# Patient Record
Sex: FEMALE | Race: WHITE | NOT HISPANIC OR LATINO | Employment: FULL TIME | URBAN - METROPOLITAN AREA
[De-identification: names, ages, dates, MRNs, and addresses within clinical notes are randomized per-mention and may not be internally consistent; named-entity substitution may affect disease eponyms.]

---

## 2017-01-18 ENCOUNTER — APPOINTMENT (OUTPATIENT)
Dept: PLASTIC SURGERY | Facility: IMAGING CENTER | Age: 67
End: 2017-01-18

## 2017-02-01 ENCOUNTER — OFFICE VISIT (OUTPATIENT)
Dept: MEDICAL GROUP | Facility: PHYSICIAN GROUP | Age: 67
End: 2017-02-01
Payer: MEDICARE

## 2017-02-01 VITALS
BODY MASS INDEX: 29.59 KG/M2 | WEIGHT: 167 LBS | HEART RATE: 81 BPM | DIASTOLIC BLOOD PRESSURE: 80 MMHG | OXYGEN SATURATION: 95 % | HEIGHT: 63 IN | SYSTOLIC BLOOD PRESSURE: 148 MMHG | TEMPERATURE: 97.9 F

## 2017-02-01 DIAGNOSIS — F11.20 OPIOID TYPE DEPENDENCE, CONTINUOUS (HCC): ICD-10-CM

## 2017-02-01 DIAGNOSIS — Z76.0 ENCOUNTER FOR MEDICATION REFILL: ICD-10-CM

## 2017-02-01 DIAGNOSIS — M25.561 ACUTE PAIN OF RIGHT KNEE: ICD-10-CM

## 2017-02-01 DIAGNOSIS — Z78.0 POSTMENOPAUSAL: ICD-10-CM

## 2017-02-01 DIAGNOSIS — M54.42 CHRONIC LEFT-SIDED LOW BACK PAIN WITH LEFT-SIDED SCIATICA: ICD-10-CM

## 2017-02-01 DIAGNOSIS — G89.29 CHRONIC LEFT-SIDED LOW BACK PAIN WITH LEFT-SIDED SCIATICA: ICD-10-CM

## 2017-02-01 DIAGNOSIS — M70.62 TROCHANTERIC BURSITIS OF LEFT HIP: ICD-10-CM

## 2017-02-01 PROCEDURE — G8432 DEP SCR NOT DOC, RNG: HCPCS | Performed by: NURSE PRACTITIONER

## 2017-02-01 PROCEDURE — 1101F PT FALLS ASSESS-DOCD LE1/YR: CPT | Performed by: NURSE PRACTITIONER

## 2017-02-01 PROCEDURE — 20600 DRAIN/INJ JOINT/BURSA W/O US: CPT | Performed by: NURSE PRACTITIONER

## 2017-02-01 PROCEDURE — 4040F PNEUMOC VAC/ADMIN/RCVD: CPT | Performed by: NURSE PRACTITIONER

## 2017-02-01 PROCEDURE — 1036F TOBACCO NON-USER: CPT | Performed by: NURSE PRACTITIONER

## 2017-02-01 PROCEDURE — G8482 FLU IMMUNIZE ORDER/ADMIN: HCPCS | Performed by: NURSE PRACTITIONER

## 2017-02-01 PROCEDURE — 99214 OFFICE O/P EST MOD 30 MIN: CPT | Mod: 25 | Performed by: NURSE PRACTITIONER

## 2017-02-01 PROCEDURE — 3014F SCREEN MAMMO DOC REV: CPT | Performed by: NURSE PRACTITIONER

## 2017-02-01 PROCEDURE — 3017F COLORECTAL CA SCREEN DOC REV: CPT | Performed by: NURSE PRACTITIONER

## 2017-02-01 PROCEDURE — G8420 CALC BMI NORM PARAMETERS: HCPCS | Performed by: NURSE PRACTITIONER

## 2017-02-01 RX ORDER — FEXOFENADINE HCL AND PSEUDOEPHEDRINE HCI 60; 120 MG/1; MG/1
1 TABLET, EXTENDED RELEASE ORAL 2 TIMES DAILY
Qty: 60 TAB | Refills: 0 | Status: SHIPPED | OUTPATIENT
Start: 2017-02-01 | End: 2017-03-01 | Stop reason: SDUPTHER

## 2017-02-01 NOTE — MR AVS SNAPSHOT
"        Kayla Jensen   2017 9:20 AM   Office Visit   MRN: [id number]    Department:  [facility]   Dept Phone:  [phone]    Description:  Female : 1950   Provider:  KATY Gay           Reason for Visit     Hip Pain x3days. left hip    Knee Pain x3days. right knee      Allergies as of 2017     Allergen Noted Reactions    Aspirin 2009       Stomach pain    Ibuprofen 2009       Stomach pain    Tape 2009   Rash      You were diagnosed with     Acute frontal sinusitis, recurrence not specified   [3750274]       Encounter for medication refill   [231123]       Chronic left-sided low back pain with left-sided sciatica   [4616257]       Opioid type dependence, continuous (CMS-HCC)   [304.01.ICD-9-CM]         Vital Signs     Blood Pressure Pulse Temperature Height Weight Body Mass Index    148/80 mmHg 81 36.6 °C (97.9 °F) 1.6 m (5' 3\") 75.751 kg (167 lb) 29.59 kg/m2    Oxygen Saturation Smoking Status                95% Former Smoker          Basic Information     Date Of Birth Sex Race Ethnicity Preferred Language    1950 Female White Non- English      Your appointments     Mar 15, 2017  1:00 PM   Medical Spa with COSMETIC LASER MD   [facility] ([facility])    [address] [facility] [address]   [phone]            2017  9:00 AM   BONE DENSITY (DEXASCAN) with [facility] BD 1   [facility] ([address])    [address]  [facility] [address]   [phone]           No calcium supplements 24 hours prior to exam, including antacids or multivitamins w/calcium.  Pt may eat and drink normally.  No injection procedures prior to Dexa on the same day.  No barium contrast, no CTs with IV or oral contrast, no Pet/CTs and no Nuc Med injections for 7 days prior to a Dexa (including Barium Swallow, Upper GI, Small Bowel Series).  If scheduling a Dexa on the same day as a CT with IV or " oral contrast, any test with barium, Pet/CT or a Nuc Med with injection, always schedule the Dexa before the other study.            Apr 04, 2017  9:30 AM   MA SCRN10 with [facility] MG 2   [facility] ([address])    [address]  [place] [id number] [phone]           No deodorant, powder, perfume or lotion under the arm or breast area.            May 30, 2017  8:00 AM   Established Patient with KATY Gay   [facility])    [address] [facility] [id number] [phone]           You will be receiving a confirmation call a few days before your appointment from our automated call confirmation system.              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   3/18/2016 - Present    Personal history of smoking Z87.891   3/18/2016 - Present    Acute recurrent pansinusitis J01.41   9/21/2016 - Present    Opioid type dependence, continuous (CMS-HCC) F11.20   2/1/2017 - Present      Health Maintenance        Date Due Completion Dates    BONE DENSITY 11/23/2014 ---    MAMMOGRAM 4/8/2017 4/8/2016, 4/1/2016, 4/1/2016    IMM PNEUMOCOCCAL 65+ (ADULT) LOW/MEDIUM RISK SERIES (2 of 2 - PPSV23) 11/29/2017 11/29/2016    COLONOSCOPY 11/29/2019 11/29/2009 (Done)    Override on 11/29/2009: Done ([facility])    IMM DTaP/Tdap/Td Vaccine (2 - Td) 11/29/2026 11/29/2016            Current Immunizations     13-VALENT PCV PREVNAR 11/29/2016    Influenza Vaccine Adult HD 9/6/2016 10:36 AM    Influenza Vaccine Quad Inj (Pf) 10/2/2014    SHINGLES VACCINE 11/29/2016    Tdap Vaccine 11/29/2016      Below and/or attached are the medications your provider expects you to take. Review all of your home medications and newly ordered medications with your provider and/or pharmacist. Follow medication  instructions as directed by your provider and/or pharmacist. Please keep your medication list with you and share with your provider. Update the information when medications are discontinued, doses are changed, or new medications (including over-the-counter products) are added; and carry medication information at all times in the event of emergency situations     Allergies:  ASPIRIN - (reactions not documented)     IBUPROFEN - (reactions not documented)     TAPE - Rash               Medications  Valid as of: February 01, 2017 - 10:09 AM    Generic Name Brand Name Tablet Size Instructions for use    Acetaminophen-Codeine (Tab) TYLENOL #3 300-30 MG Take 1 Tab by mouth every four hours as needed.        Albuterol Sulfate (Aero Soln) albuterol 108 (90 BASE) MCG/ACT Inhale 2 Puffs by mouth every 6 hours as needed for Shortness of Breath.        Fexofenadine-Pseudoephedrine (TABLET SR 12 HR) ALLEGRA-D  MG Take 1 Tab by mouth 2 times a day.        Gabapentin (Cap) NEURONTIN 300 MG Take 1 Cap by mouth 2 Times a Day.        Mometasone Furoate (Suspension) NASONEX 50 MCG/ACT Spray 2 Sprays in nose every day.        Ondansetron (TABLET DISPERSIBLE) ZOFRAN ODT 4 MG Take 1 Tab by mouth every 8 hours as needed for Nausea/Vomiting.        TraMADol HCl (Tab) ULTRAM 50 MG TAKE 1 TABLET BY MOUTH EVERY 8 HOURS AS NEDDED        .                 Medicines prescribed today were sent to:     [facility] #[id number] - [place] - [address] [place] [address] AT [address] [address]    Phone: [phone] Fax: [phone]    Open 24 Hours?: No      Medication refill instructions:       If your prescription bottle indicates you have medication refills left, it is not necessary to call your provider’s office. Please contact your pharmacy and they will refill your medication.    If your prescription bottle indicates you do not have any refills left, you may request refills at any time through one of the following  ways: The online [employer] system (except Urgent Care), by calling your provider’s office, or by asking your pharmacy to contact your provider’s office with a refill request. Medication refills are processed only during regular business hours and may not be available until the next business day. Your provider may request additional information or to have a follow-up visit with you prior to refilling your medication.   *Please Note: Medication refills are assigned a new Rx number when refilled electronically. Your pharmacy may indicate that no refills were authorized even though a new prescription for the same medication is available at the pharmacy. Please request the medicine by name with the pharmacy before contacting your provider for a refill.        Referral     A referral request has been sent to our patient care coordination department. Please allow 3-5 business days for us to process this request and contact you either by phone or mail. If you do not hear from us by the 5th business day, please call us at [phone].           [employer] Access Code: [email or web address]  Expires: 2/17/2017  4:49 PM    [employer]  A secure, online tool to manage your health information     [employer]® is a secure, online tool that connects you to your personalized health information from the privacy of your home -- day or night - making it very easy for you to manage your healthcare. Once the activation process is completed, you can even access your medical information using the [employer] jeanna, which is available for free in the [employer] Jeanna store or Google Play store.     [employer] provides the following levels of access (as shown below):   My Chart Features   [facility]  Specialists [facility]  Urgent  Care Non-[facility]  Primary Care  Doctor   Email your healthcare team securely and privately 24/7 X X X    Manage appointments: schedule your next appointment; view details of past/upcoming appointments X       Request prescription refills. X      View recent personal medical records, including lab and immunizations X X X X   View health record, including health history, allergies, medications X X X X   Read reports about your outpatient visits, procedures, consult and ER notes X X X X   See your discharge summary, which is a recap of your hospital and/or ER visit that includes your diagnosis, lab results, and care plan. X X       How to register for [employer]:  1. Go to  [email or web address].  2. Click on the Sign Up Now box, which takes you to the New Member Sign Up page. You will need to provide the following information:  a. Enter your [employer] Access Code exactly as it appears at the top of this page. (You will not need to use this code after you’ve completed the sign-up process. If you do not sign up before the expiration date, you must request a new code.)   b. Enter your date of birth.   c. Enter your home email address.   d. Click Submit, and follow the next screen’s instructions.  3. Create a [employer] ID. This will be your [employer] login ID and cannot be changed, so think of one that is secure and easy to remember.  4. Create a [employer] password. You can change your password at any time.  5. Enter your Password Reset Question and Answer. This can be used at a later time if you forget your password.   6. Enter your e-mail address. This allows you to receive e-mail notifications when new information is available in [employer].  7. Click Sign Up. You can now view your health information.    For assistance activating your [employer] account, call [phone]

## 2017-02-01 NOTE — PROGRESS NOTES
Subjective:     Chief Complaint   Patient presents with   • Hip Pain     x3days. left hip   • Knee Pain     x3days. right knee       HPI  Kayla Jensen is a 66 y.o. female here today for complaints of severe left hip pain and moderate right knee pain. Two days ago she was in [place] on vacation and walking a lot, but states she walks a lot on a normal basis anyway. She developed pain Sunday night. Monday the pain got worse. She has had back pain for quite some time with left-sided sciatica, but is getting progressively worse in left hip. She also states that this pain feels somewhat different and in a different location. She has also developed right knee pain, with swelling in mdial aspect of knee. States that this occurs intermittently. No treatments tried so far. Denies any bowel or bladder changes. She is having difficulty walking due to the pain. Denies any numbness, tingling, or weakness of lower extremities. Denies any erythema or warmth of knee.     Additionally, patient would like me to take over her chronic pain management.    Diagnoses of Trochanteric bursitis of left hip, Acute pain of right knee, Chronic left-sided low back pain with left-sided sciatica, Opioid type dependence, continuous (CMS-[facility]), Encounter for medication refill, and Postmenopausal were pertinent to this visit.    Allergies: Aspirin; Ibuprofen; and Tape  Current medicines (including changes today)  Current Outpatient Prescriptions   Medication Sig Dispense Refill   • acetaminophen-codeine #3 (TYLENOL #3) 300-30 MG Tab Take 1 Tab by mouth every four hours as needed. 90 Tab 0   • fexofenadine-pseudoephedrine (ALLEGRA-D)  MG per tablet Take 1 Tab by mouth 2 times a day. 60 Tab 0   • gabapentin (NEURONTIN) 300 MG Cap Take 1 Cap by mouth 2 Times a Day. 180 Cap 1   • mometasone (NASONEX) 50 MCG/ACT nasal spray Spray 2 Sprays in nose every day. 1 Inhaler 0   • ondansetron (ZOFRAN ODT) 4 MG TABLET DISPERSIBLE Take 1 Tab by  "mouth every 8 hours as needed for Nausea/Vomiting. 10 Tab 0   • tramadol (ULTRAM) 50 MG Tab TAKE 1 TABLET BY MOUTH EVERY 8 HOURS AS NEDDED 90 Tab 0   • albuterol 108 (90 BASE) MCG/ACT Aero Soln inhalation aerosol Inhale 2 Puffs by mouth every 6 hours as needed for Shortness of Breath. 8.5 g 3     No current facility-administered medications for this visit.       She  has a past medical history of Heart burn; Unspecified hemorrhagic conditions (CMS-HCC); Personal history of venous thrombosis and embolism; Allergy; and GERD (gastroesophageal reflux disease). She also has no past medical history of Infectious disease, Psychiatric problem, Pacemaker, Angina, Heart valve disease, Rheumatic fever, Bronchitis, Pneumonia, Jaundice, Indigestion, Unspecified urinary incontinence, Dialysis, Other specified symptom associated with female genital organs, or Backpain.    Health Maintenance: DEXA due    ROS  As stated in HPI and additionally  General: No fever or chills     Objective:     Blood pressure 148/80, pulse 81, temperature 36.6 °C (97.9 °F), height 1.6 m (5' 3\"), weight 75.751 kg (167 lb), SpO2 95 %. Body mass index is 29.59 kg/(m^2).  Physical Exam:  General: Alert, oriented, in no acute distress.  Eye contact is good, speech goal directed, affect calm  Ext: no edema, normal color and temperature.   Right knee: +edema medial aspect along joint line. Full unrestricted symmetric ROM. No crepitus. No erythema or warmth  No palpable effusions. + joint line tenderness medial only.   Alignment is normal.    Ligaments: anterior drawer test negative, posterior drawer test negative  Strength 5/5 Quadriceps, hamstrings, extensor hallius longus, tibialis anterior, gastroc.   Gait normal. Able to bear full weight. Posterior tibial and pedal pulses 2+.   Spine: Thoracic and lumbar paraspinous muscles without tenderness or spasm. No spinous process tenderness. No SI joint tenderness. Full hip ROM bilat, but pain with left hip " abduction. SLT negative. Strength 5/5 prox and distal LE. Significant tenderness with palpation of left hip bursa site    A steroid injection was performed at left hip trochanter using 2% plain Lidocaine and 60 mg of Kenalog. This was well tolerated.      Assessment and Plan:   The following treatment plan was discussed  1. Trochanteric bursitis of left hip  based on significant tenderness with palpation during examination, I suspect bursitis. Joint injection performed at bursa site of left trochanter. Patient tolerated well and was educated on risk of infection and symptoms to watch out for. Encourage rest, ice, and Epsom salt baths    2. Acute pain of right knee  encouraged rest, ice, elevation, and to notify me if it does not improve in 2 weeks    3. Chronic left-sided low back pain with left-sided sciatica  acetaminophen-codeine #3 (TYLENOL #3) 300-30 MG Tab    REFERRAL TO PHYSICAL THERAPY Reason for Therapy: Eval/Treat/Report    Controlled Substance Treatment Agreement   4. Opioid type dependence, continuous (CMS-HCC)  Controlled Substance Treatment Agreement   5. Encounter for medication refill  fexofenadine-pseudoephedrine (ALLEGRA-D)  MG per tablet   6. Postmenopausal  DS-BONE DENSITY STUDY (DEXA)       Followup: Return in about 3 months (around 5/1/2017). sooner should new symptoms or problems arise.

## 2017-02-27 ENCOUNTER — OFFICE VISIT (OUTPATIENT)
Dept: URGENT CARE | Facility: PHYSICIAN GROUP | Age: 67
End: 2017-02-27
Payer: MEDICARE

## 2017-02-27 VITALS
HEART RATE: 83 BPM | OXYGEN SATURATION: 96 % | BODY MASS INDEX: 28.35 KG/M2 | SYSTOLIC BLOOD PRESSURE: 118 MMHG | WEIGHT: 160 LBS | DIASTOLIC BLOOD PRESSURE: 72 MMHG | TEMPERATURE: 98.6 F | HEIGHT: 63 IN

## 2017-02-27 DIAGNOSIS — J01.90 ACUTE SINUSITIS, RECURRENCE NOT SPECIFIED, UNSPECIFIED LOCATION: ICD-10-CM

## 2017-02-27 DIAGNOSIS — R05.9 COUGH: ICD-10-CM

## 2017-02-27 PROCEDURE — G8432 DEP SCR NOT DOC, RNG: HCPCS | Performed by: NURSE PRACTITIONER

## 2017-02-27 PROCEDURE — 4040F PNEUMOC VAC/ADMIN/RCVD: CPT | Performed by: NURSE PRACTITIONER

## 2017-02-27 PROCEDURE — G8482 FLU IMMUNIZE ORDER/ADMIN: HCPCS | Performed by: NURSE PRACTITIONER

## 2017-02-27 PROCEDURE — G8420 CALC BMI NORM PARAMETERS: HCPCS | Performed by: NURSE PRACTITIONER

## 2017-02-27 PROCEDURE — 99214 OFFICE O/P EST MOD 30 MIN: CPT | Performed by: NURSE PRACTITIONER

## 2017-02-27 PROCEDURE — 3017F COLORECTAL CA SCREEN DOC REV: CPT | Performed by: NURSE PRACTITIONER

## 2017-02-27 PROCEDURE — 1101F PT FALLS ASSESS-DOCD LE1/YR: CPT | Performed by: NURSE PRACTITIONER

## 2017-02-27 PROCEDURE — 3014F SCREEN MAMMO DOC REV: CPT | Performed by: NURSE PRACTITIONER

## 2017-02-27 PROCEDURE — 1036F TOBACCO NON-USER: CPT | Performed by: NURSE PRACTITIONER

## 2017-02-27 RX ORDER — TRIAMCINOLONE ACETONIDE 55 UG/1
2 SPRAY, METERED NASAL DAILY
Qty: 1 INHALER | Refills: 0 | Status: SHIPPED | OUTPATIENT
Start: 2017-02-27 | End: 2017-11-15

## 2017-02-27 RX ORDER — CODEINE PHOSPHATE AND GUAIFENESIN 10; 100 MG/5ML; MG/5ML
5 SOLUTION ORAL EVERY 4 HOURS PRN
Qty: 240 ML | Refills: 0 | Status: SHIPPED | OUTPATIENT
Start: 2017-02-27 | End: 2017-03-29

## 2017-02-27 ASSESSMENT — ENCOUNTER SYMPTOMS
HEMOPTYSIS: 0
RHINORRHEA: 0
SWEATS: 0
SHORTNESS OF BREATH: 0
HEARTBURN: 0
MYALGIAS: 0
WEIGHT LOSS: 0
HEADACHES: 0
FEVER: 0
WHEEZING: 0
SORE THROAT: 1
COUGH: 1
CHILLS: 0

## 2017-02-27 ASSESSMENT — COPD QUESTIONNAIRES: COPD: 0

## 2017-02-27 NOTE — PROGRESS NOTES
"Subjective:      Kayla Jensen is a 66 y.o. female who presents with Cough    Chief Complaint   Patient presents with   • Cough     sinus pressure, chest congestion        Denies past medical, surgical or family history that is significant to today's problem.   RX or OTC medications reviewed with patient today.   Allergies   Allergen Reactions   • Aspirin      Stomach pain   • Ibuprofen      Stomach pain   • Tape Rash               Cough  This is a new problem. The current episode started yesterday. The problem has been gradually worsening. The problem occurs constantly. The cough is non-productive. Associated symptoms include nasal congestion and a sore throat. Pertinent negatives include no chest pain, chills, ear congestion, ear pain, fever, headaches, heartburn, hemoptysis, myalgias, postnasal drip, rash, rhinorrhea, shortness of breath, sweats, weight loss or wheezing. Nothing aggravates the symptoms. Treatments tried: Allegra D. The treatment provided mild relief. Her past medical history is significant for bronchitis. There is no history of asthma, bronchiectasis, COPD, emphysema, environmental allergies or pneumonia.       Review of Systems   Constitutional: Negative for fever, chills and weight loss.   HENT: Positive for sore throat. Negative for ear pain, postnasal drip and rhinorrhea.    Respiratory: Positive for cough. Negative for hemoptysis, shortness of breath and wheezing.    Cardiovascular: Negative for chest pain.   Gastrointestinal: Negative for heartburn.   Musculoskeletal: Negative for myalgias.   Skin: Negative for rash.   Neurological: Negative for headaches.   Endo/Heme/Allergies: Negative for environmental allergies.          Objective:     /72 mmHg  Pulse 83  Temp(Src) 37 °C (98.6 °F)  Ht 1.6 m (5' 3\")  Wt 72.576 kg (160 lb)  BMI 28.35 kg/m2  SpO2 96%     Physical Exam   Constitutional: She is oriented to person, place, and time. Vital signs are normal. She appears " well-developed and well-nourished.  Non-toxic appearance. She does not have a sickly appearance. She does not appear ill. No distress.   HENT:   Head: Normocephalic.   Right Ear: Hearing, external ear and ear canal normal. Tympanic membrane is bulging.   Left Ear: Hearing, external ear and ear canal normal. Tympanic membrane is injected and bulging.   Nose: Mucosal edema and rhinorrhea present. Right sinus exhibits no maxillary sinus tenderness and no frontal sinus tenderness. Left sinus exhibits no maxillary sinus tenderness and no frontal sinus tenderness.   Mouth/Throat: Uvula is midline and mucous membranes are normal. Posterior oropharyngeal erythema present. No oropharyngeal exudate or posterior oropharyngeal edema.   Eyes: Lids are normal. Pupils are equal, round, and reactive to light.   Neck: Trachea normal, normal range of motion and phonation normal. Neck supple.   Cardiovascular: Normal rate, regular rhythm, normal heart sounds and normal pulses.    Pulmonary/Chest: Effort normal and breath sounds normal. No tachypnea. No respiratory distress. She has no decreased breath sounds. She has no wheezes. She has no rhonchi. She has no rales.   Abdominal: Soft.   Musculoskeletal: Normal range of motion.   Lymphadenopathy:        Head (right side): No tonsillar, no preauricular and no posterior auricular adenopathy present.        Head (left side): No tonsillar, no preauricular and no posterior auricular adenopathy present.     She has no cervical adenopathy.   Neurological: She is alert and oriented to person, place, and time.   Skin: Skin is warm, dry and intact.   Psychiatric: She has a normal mood and affect. Her speech is normal and behavior is normal. Judgment and thought content normal.   Nursing note and vitals reviewed.              Assessment/Plan:     1. Acute sinusitis, recurrence not specified, unspecified location  triamcinolone (NASACORT) 55 MCG/ACT nasal inhaler    guaifenesin-codeine (ROBITUSSIN  AC) Solution oral solution   2. Cough  guaifenesin-codeine (ROBITUSSIN AC) Solution oral solution     Educated in proper administration of medication(s) ordered today including safety, possible SE, risks, benefits, rationale and alternatives to therapy.   Return to clinic or PCP 5-7 days if current symptoms are not resolving in a satisfactory manner or sooner if new or worsening symptoms occur.   Patient and or family advised differential diagnoses, signs and symptoms which would warrant Emergency Department evaluation.  Verbalizes understanding of instructions.   Pt education done. Aftercare instructions given to pt/ caregiver. Questions answered. Verbalizes good understanding.   Advised not to drink ETOH, drive car or operate machinery while taking narcotic RX . Verbalizes understanding of safety instructions.  Do not combine with other narcotic medications she has at home. PDMP check done.   Discussed that I felt this was viral in nature. Did not see any evidence of a bacterial process. Discussed natural progression and sx care.  Resume all prior medications. Take as prescribed.

## 2017-02-27 NOTE — MR AVS SNAPSHOT
"        Kayla Jensen   2017 8:05 AM   Office Visit   MRN: [id number]    Department:  [facility]   Dept Phone:  [phone]    Description:  Female : 1950   Provider:  TESSY Craig           Reason for Visit     Cough sinus pressure, chest congestion       Allergies as of 2017     Allergen Noted Reactions    Aspirin 2009       Stomach pain    Ibuprofen 2009       Stomach pain    Tape 2009   Rash      You were diagnosed with     Acute sinusitis, recurrence not specified, unspecified location   [7420189]       Cough   [786.2.ICD-9-CM]         Vital Signs     Blood Pressure Pulse Temperature Height Weight Body Mass Index    118/72 mmHg 83 37 °C (98.6 °F) 1.6 m (5' 3\") 72.576 kg (160 lb) 28.35 kg/m2    Oxygen Saturation Smoking Status                96% Former Smoker          Basic Information     Date Of Birth Sex Race Ethnicity Preferred Language    1950 Female White Non- English      Your appointments     Mar 15, 2017  1:00 PM   Medical Spa with COSMETIC LASER MD   [facility] ([facility])    [address] [facility] [address]   [phone]            2017  9:00 AM   BONE DENSITY (DEXASCAN) with [facility] BD 1   [facility] ([address])    [address] [place] [address] [address]  [facility] [address]   [phone]           No calcium supplements 24 hours prior to exam, including antacids or multivitamins w/calcium.  Pt may eat and drink normally.  No injection procedures prior to Dexa on the same day.  No barium contrast, no CTs with IV or oral contrast, no Pet/CTs and no Nuc Med injections for 7 days prior to a Dexa (including Barium Swallow, Upper GI, Small Bowel Series).  If scheduling a Dexa on the same day as a CT with IV or oral contrast, any test with barium, Pet/CT or a Nuc Med with injection, always schedule the Dexa before the other study.            2017  9:30 AM   "   [place] SCRN10 with [facility] MG 2   [facility] ([address])    [address]  [place] [id number] [phone]           No deodorant, powder, perfume or lotion under the arm or breast area.            May 30, 2017  8:00 AM   Established Patient with KATY Gay   [facility] ([place])    [address] [facility] [place] [id number] [phone]           You will be receiving a confirmation call a few days before your appointment from our automated call confirmation system.              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   3/18/2016 - Present    Personal history of smoking Z87.891   3/18/2016 - Present    Acute recurrent pansinusitis J01.41   9/21/2016 - Present    Opioid type dependence, continuous (CMS-HCC) F11.20   2/1/2017 - Present      Health Maintenance        Date Due Completion Dates    BONE DENSITY 11/23/2014 ---    MAMMOGRAM 4/8/2017 4/8/2016, 4/1/2016, 4/1/2016    IMM PNEUMOCOCCAL 65+ (ADULT) LOW/MEDIUM RISK SERIES (2 of 2 - PPSV23) 11/29/2017 11/29/2016    COLONOSCOPY 11/29/2019 11/29/2009 (Done)    Override on 11/29/2009: Done ([facility])    IMM DTaP/Tdap/Td Vaccine (2 - Td) 11/29/2026 11/29/2016            Current Immunizations     13-VALENT PCV PREVNAR 11/29/2016    Influenza Vaccine Adult HD 9/6/2016 10:36 AM    Influenza Vaccine Quad Inj (Pf) 10/2/2014    SHINGLES VACCINE 11/29/2016    Tdap Vaccine 11/29/2016      Below and/or attached are the medications your provider expects you to take. Review all of your home medications and newly ordered medications with your provider and/or pharmacist. Follow medication instructions as directed by your provider and/or pharmacist. Please keep your medication list with you and share with your provider. Update the information  when medications are discontinued, doses are changed, or new medications (including over-the-counter products) are added; and carry medication information at all times in the event of emergency situations     Allergies:  ASPIRIN - (reactions not documented)     IBUPROFEN - (reactions not documented)     TAPE - Rash               Medications  Valid as of: February 27, 2017 - 11:06 AM    Generic Name Brand Name Tablet Size Instructions for use    Acetaminophen-Codeine (Tab) TYLENOL #3 300-30 MG Take 1 Tab by mouth every four hours as needed.        Albuterol Sulfate (Aero Soln) albuterol 108 (90 BASE) MCG/ACT Inhale 2 Puffs by mouth every 6 hours as needed for Shortness of Breath.        Fexofenadine-Pseudoephedrine (TABLET SR 12 HR) ALLEGRA-D  MG Take 1 Tab by mouth 2 times a day.        Gabapentin (Cap) NEURONTIN 300 MG Take 1 Cap by mouth 2 Times a Day.        Guaifenesin-Codeine (Solution) ROBITUSSIN -10 mg/5mL Take 5 mL by mouth every four hours as needed for Cough.        Mometasone Furoate (Suspension) NASONEX 50 MCG/ACT Spray 2 Sprays in nose every day.        Ondansetron (TABLET DISPERSIBLE) ZOFRAN ODT 4 MG Take 1 Tab by mouth every 8 hours as needed for Nausea/Vomiting.        TraMADol HCl (Tab) ULTRAM 50 MG TAKE 1 TABLET BY MOUTH EVERY 8 HOURS AS NEDDED        Triamcinolone Acetonide (Aerosol) NASACORT 55 MCG/ACT Anderson 2 Sprays in nose every day.        .                 Medicines prescribed today were sent to:     [place] [address] [place] [address] [place] [address] [facility] [address]    Phone: [phone] Fax: [phone]    Open 24 Hours?: No      Medication refill instructions:       If your prescription bottle indicates you have medication refills left, it is not necessary to call your provider’s office. Please contact your pharmacy and they will refill your medication.    If your prescription bottle indicates you do not have any refills left, you may request refills  at any time through one of the following ways: The online [employer] system (except Urgent Care), by calling your provider’s office, or by asking your pharmacy to contact your provider’s office with a refill request. Medication refills are processed only during regular business hours and may not be available until the next business day. Your provider may request additional information or to have a follow-up visit with you prior to refilling your medication.   *Please Note: Medication refills are assigned a new Rx number when refilled electronically. Your pharmacy may indicate that no refills were authorized even though a new prescription for the same medication is available at the pharmacy. Please request the medicine by name with the pharmacy before contacting your provider for a refill.           [employer] Access Code: [email or web address]  Expires: 3/29/2017 11:06 AM    [employer]  A secure, online tool to manage your health information     [employer]’s [employer]® is a secure, online tool that connects you to your personalized health information from the privacy of your home -- day or night - making it very easy for you to manage your healthcare. Once the activation process is completed, you can even access your medical information using the [employer] jeanna, which is available for free in the Apple Jeanna store or Google Play store.     [employer] provides the following levels of access (as shown below):   My Chart Features   [facility] Doctor [facility]  Urgent  Care Non-[facility]  Primary Care  Doctor   Email your healthcare team securely and privately 24/7 X X X    Manage appointments: schedule your next appointment; view details of past/upcoming appointments X      Request prescription refills. X      View recent personal medical records, including lab and immunizations X X X X   View health record, including health history, allergies, medications X X X X   Read reports about your outpatient visits,  procedures, consult and ER notes X X X X   See your discharge summary, which is a recap of your hospital and/or ER visit that includes your diagnosis, lab results, and care plan. X X       How to register for [employer]:  1. Go to  [email or web address].  2. Click on the Sign Up Now box, which takes you to the New Member Sign Up page. You will need to provide the following information:  a. Enter your [employer] Access Code exactly as it appears at the top of this page. (You will not need to use this code after you’ve completed the sign-up process. If you do not sign up before the expiration date, you must request a new code.)   b. Enter your date of birth.   c. Enter your home email address.   d. Click Submit, and follow the next screen’s instructions.  3. Create a [employer] ID. This will be your [employer] login ID and cannot be changed, so think of one that is secure and easy to remember.  4. Create a [employer] password. You can change your password at any time.  5. Enter your Password Reset Question and Answer. This can be used at a later time if you forget your password.   6. Enter your e-mail address. This allows you to receive e-mail notifications when new information is available in [facility].  7. Click Sign Up. You can now view your health information.    For assistance activating your [employer] account, call [phone]

## 2017-03-01 RX ORDER — FEXOFENADINE HCL AND PSEUDOEPHEDRINE HCI 60; 120 MG/1; MG/1
TABLET, EXTENDED RELEASE ORAL
Qty: 180 TAB | Refills: 3 | Status: SHIPPED | OUTPATIENT
Start: 2017-03-01 | End: 2018-06-18 | Stop reason: SDUPTHER

## 2017-03-02 ENCOUNTER — OFFICE VISIT (OUTPATIENT)
Dept: URGENT CARE | Facility: PHYSICIAN GROUP | Age: 67
End: 2017-03-02
Payer: MEDICARE

## 2017-03-02 VITALS
HEART RATE: 80 BPM | SYSTOLIC BLOOD PRESSURE: 136 MMHG | BODY MASS INDEX: 28.35 KG/M2 | DIASTOLIC BLOOD PRESSURE: 80 MMHG | OXYGEN SATURATION: 91 % | WEIGHT: 160 LBS | RESPIRATION RATE: 16 BRPM | TEMPERATURE: 97.9 F

## 2017-03-02 DIAGNOSIS — J01.41 ACUTE RECURRENT PANSINUSITIS: ICD-10-CM

## 2017-03-02 DIAGNOSIS — J31.0 CHRONIC NONALLERGIC RHINITIS: ICD-10-CM

## 2017-03-02 DIAGNOSIS — H61.21 IMPACTED CERUMEN, RIGHT EAR: ICD-10-CM

## 2017-03-02 PROCEDURE — 1036F TOBACCO NON-USER: CPT | Performed by: EMERGENCY MEDICINE

## 2017-03-02 PROCEDURE — 1101F PT FALLS ASSESS-DOCD LE1/YR: CPT | Performed by: EMERGENCY MEDICINE

## 2017-03-02 PROCEDURE — G8432 DEP SCR NOT DOC, RNG: HCPCS | Performed by: EMERGENCY MEDICINE

## 2017-03-02 PROCEDURE — 3014F SCREEN MAMMO DOC REV: CPT | Performed by: EMERGENCY MEDICINE

## 2017-03-02 PROCEDURE — 4040F PNEUMOC VAC/ADMIN/RCVD: CPT | Performed by: EMERGENCY MEDICINE

## 2017-03-02 PROCEDURE — 99214 OFFICE O/P EST MOD 30 MIN: CPT | Mod: 25 | Performed by: EMERGENCY MEDICINE

## 2017-03-02 PROCEDURE — G8420 CALC BMI NORM PARAMETERS: HCPCS | Performed by: EMERGENCY MEDICINE

## 2017-03-02 PROCEDURE — G8482 FLU IMMUNIZE ORDER/ADMIN: HCPCS | Performed by: EMERGENCY MEDICINE

## 2017-03-02 PROCEDURE — 69209 REMOVE IMPACTED EAR WAX UNI: CPT | Performed by: EMERGENCY MEDICINE

## 2017-03-02 PROCEDURE — 3017F COLORECTAL CA SCREEN DOC REV: CPT | Performed by: EMERGENCY MEDICINE

## 2017-03-02 RX ORDER — PREDNISONE 20 MG/1
40 TABLET ORAL DAILY
Qty: 10 TAB | Refills: 0 | Status: SHIPPED | OUTPATIENT
Start: 2017-03-02 | End: 2017-05-04

## 2017-03-02 RX ORDER — AMOXICILLIN AND CLAVULANATE POTASSIUM 875; 125 MG/1; MG/1
1 TABLET, FILM COATED ORAL 2 TIMES DAILY
Qty: 14 TAB | Refills: 0 | Status: SHIPPED | OUTPATIENT
Start: 2017-03-02 | End: 2017-05-04

## 2017-03-02 ASSESSMENT — ENCOUNTER SYMPTOMS
HEADACHES: 1
SORE THROAT: 1
SHORTNESS OF BREATH: 0
DIARRHEA: 0
ABDOMINAL PAIN: 0
SINUS PRESSURE: 1
SPUTUM PRODUCTION: 1
WHEEZING: 0
VOMITING: 0
HEMOPTYSIS: 0
CHILLS: 1
COUGH: 1

## 2017-03-02 NOTE — PATIENT INSTRUCTIONS
Use over-the-counter pain reliever, such as acetaminophen (Tylenol), ibuprofen (Advil, Motrin) or naproxen (Aleve) as needed; follow package directions for dosing.  Use over-the-counter oxymetazoline (Afrin) nasal spray as needed for up to 3 days only; follow package instructions for dosing.  Use saline nasal irrigation, such as with a Neti Pot, as needed daily for relief of nasal or sinus congestion relief.  Continue inhaled nasal steroid (Nasacort) daily; continue for at least 2-3 weeks.  Use an oral probiotic daily, such as Culturelle, Align, or yogurt to reduce gastrointestinal symptoms.  Cerumen Plug  A cerumen plug is having too much wax in your ear canal. The outer ear canal is lined with hairs and glands that secrete wax. This wax is called cerumen. This protects the ear canal. It also helps prevent material from entering the ear. Too much wax can cause a feeling of fullness in the ears, decreased hearing, ringing in the ears, or an earache. Sometimes your caregiver will remove a cerumen plug with an instrument called a curette. Or he/she may flush the ear canal with warm water from a syringe to remove the wax. You may simply be sent home to follow the home care instructions below for wax removal.  Generally ear wax does not have to be removed unless it is causing a problem such as one of those listed above. When too much wax is causing a problem, the following are a few home remedies which can be used to help this problem.  HOME CARE INSTRUCTIONS   · Put a couple drops of glycerin, baby oil, or mineral oil in the ear a couple times of day. Do this every day for several days. After putting the drops in, you will need to lay with the affected ear pointing up for a couple minutes. This allows the drops to remain in the canal and run down to the area of wax blockage. This will soften the wax plug. It may also make your hearing worse as the wax softens and blocks the canal even more.  · After a couple days, you  may gently flush the ear canal with warm water from a syringe. Do this by pulling your ear up and back with your head tilted slightly forward and towards a pan to catch the water. This is most easily done with a helper. You can also accomplish the same thing by letting the shower beat into your ear canal to wash the wax out. Sometimes this will not be immediately successful. You will have to return to the first step of using the oil to further soften the wax. Then resume washing the ear canal out with a syringe or shower.  · Following removal of the wax, put ten to twenty drops of rubbing alcohol into the outer ears. This will dry the canal and prevent an infection.  · Do not irrigate or wash out your ears if you have had a perforated ear drum or mastoid surgery.  SEEK IMMEDIATE MEDICAL CARE IF:   · You are unsuccessful with the above instructions for home care.  · You develop ear pain or drainage from the ear.  MAKE SURE YOU:   · Understand these instructions.  · Will watch your condition.  · Will get help right away if you are not doing well or get worse.  Document Released: 09/12/2002 Document Revised: 03/11/2013 Document Reviewed: 12/09/2009  [employer]Care® Patient Information ©2014 [employer].  Sinusitis, Adult  Sinusitis is redness, soreness, and inflammation of the paranasal sinuses. Paranasal sinuses are air pockets within the bones of your face. They are located beneath your eyes, in the middle of your forehead, and above your eyes. In healthy paranasal sinuses, mucus is able to drain out, and air is able to circulate through them by way of your nose. However, when your paranasal sinuses are inflamed, mucus and air can become trapped. This can allow bacteria and other germs to grow and cause infection.  Sinusitis can develop quickly and last only a short time (acute) or continue over a long period (chronic). Sinusitis that lasts for more than 12 weeks is considered chronic.  CAUSES  Causes of sinusitis  include:  · Allergies.  · Structural abnormalities, such as displacement of the cartilage that separates your nostrils (deviated septum), which can decrease the air flow through your nose and sinuses and affect sinus drainage.  · Functional abnormalities, such as when the small hairs (cilia) that line your sinuses and help remove mucus do not work properly or are not present.  SIGNS AND SYMPTOMS  Symptoms of acute and chronic sinusitis are the same. The primary symptoms are pain and pressure around the affected sinuses. Other symptoms include:  · Upper toothache.  · Earache.  · Headache.  · Bad breath.  · Decreased sense of smell and taste.  · A cough, which worsens when you are lying flat.  · Fatigue.  · Fever.  · Thick drainage from your nose, which often is green and may contain pus (purulent).  · Swelling and warmth over the affected sinuses.  DIAGNOSIS  Your health care provider will perform a physical exam. During your exam, your health care provider may perform any of the following to help determine if you have acute sinusitis or chronic sinusitis:  · Look in your nose for signs of abnormal growths in your nostrils (nasal polyps).  · Tap over the affected sinus to check for signs of infection.  · View the inside of your sinuses using an imaging device that has a light attached (endoscope).  If your health care provider suspects that you have chronic sinusitis, one or more of the following tests may be recommended:  · Allergy tests.  · Nasal culture. A sample of mucus is taken from your nose, sent to a lab, and screened for bacteria.  · Nasal cytology. A sample of mucus is taken from your nose and examined by your health care provider to determine if your sinusitis is related to an allergy.  TREATMENT  Most cases of acute sinusitis are related to a viral infection and will resolve on their own within 10 days. Sometimes, medicines are prescribed to help relieve symptoms of both acute and chronic sinusitis.  These may include pain medicines, decongestants, nasal steroid sprays, or saline sprays.  However, for sinusitis related to a bacterial infection, your health care provider will prescribe antibiotic medicines. These are medicines that will help kill the bacteria causing the infection.  Rarely, sinusitis is caused by a fungal infection. In these cases, your health care provider will prescribe antifungal medicine.  For some cases of chronic sinusitis, surgery is needed. Generally, these are cases in which sinusitis recurs more than 3 times per year, despite other treatments.  HOME CARE INSTRUCTIONS  · Drink plenty of water. Water helps thin the mucus so your sinuses can drain more easily.  · Use a humidifier.  · Inhale steam 3-4 times a day (for example, sit in the bathroom with the shower running).  · Apply a warm, moist washcloth to your face 3-4 times a day, or as directed by your health care provider.  · Use saline nasal sprays to help moisten and clean your sinuses.  · Take medicines only as directed by your health care provider.  · If you were prescribed either an antibiotic or antifungal medicine, finish it all even if you start to feel better.  SEEK IMMEDIATE MEDICAL CARE IF:  · You have increasing pain or severe headaches.  · You have nausea, vomiting, or drowsiness.  · You have swelling around your face.  · You have vision problems.  · You have a stiff neck.  · You have difficulty breathing.     This information is not intended to replace advice given to you by your health care provider. Make sure you discuss any questions you have with your health care provider.     Document Released: 12/18/2006 Document Revised: 01/08/2016 Document Reviewed: 01/01/2013  [employer] Interactive Patient Education ©2016 [employer] Inc[employer]

## 2017-03-02 NOTE — PROGRESS NOTES
Subjective:      Kayla Jensen is a 66 y.o. female who presents with Sinusitis            Sinusitis  This is a recurrent problem. The current episode started in the past 7 days. The problem has been rapidly worsening since onset. Maximum temperature: subjective. The fever has been present for less than 1 day. The pain is moderate. Associated symptoms include chills, congestion, coughing, headaches, sinus pressure and a sore throat. Pertinent negatives include no ear pain, shortness of breath or sneezing. Past treatments include saline sprays.       Review of Systems   Constitutional: Positive for chills.   HENT: Positive for congestion, hearing loss, sinus pressure and sore throat. Negative for ear discharge, ear pain, nosebleeds and sneezing.    Respiratory: Positive for cough and sputum production. Negative for hemoptysis, shortness of breath and wheezing.    Cardiovascular: Negative for chest pain.   Gastrointestinal: Negative for vomiting, abdominal pain and diarrhea.   Skin: Negative for rash.   Neurological: Positive for headaches.     PMH:  has a past medical history of Heart burn; Unspecified hemorrhagic conditions (CMS-[facility]); Personal history of venous thrombosis and embolism; Allergy; and GERD (gastroesophageal reflux disease). She also has no past medical history of Infectious disease, Psychiatric problem, Pacemaker, Angina, Heart valve disease, Rheumatic fever, Bronchitis, Pneumonia, Jaundice, Indigestion, Unspecified urinary incontinence, Dialysis, Other specified symptom associated with female genital organs, or Backpain.  MEDS:   Current outpatient prescriptions:   •  amoxicillin-clavulanate (AUGMENTIN) 875-125 MG Tab, Take 1 Tab by mouth 2 times a day., Disp: 14 Tab, Rfl: 0  •  predniSONE (DELTASONE) 20 MG Tab, Take 2 Tabs by mouth every day., Disp: 10 Tab, Rfl: 0  •  fexofenadine-pseudoephedrine (ALLEGRA-D)  MG per tablet, TAKE 1 TABLET BY MOUTH 2 TIMES A DAY., Disp: 180 Tab, Rfl: 3  •   triamcinolone (NASACORT) 55 MCG/ACT nasal inhaler, Spray 2 Sprays in nose every day., Disp: 1 Inhaler, Rfl: 0  •  guaifenesin-codeine (ROBITUSSIN AC) Solution oral solution, Take 5 mL by mouth every four hours as needed for Cough., Disp: 240 mL, Rfl: 0  •  acetaminophen-codeine #3 (TYLENOL #3) 300-30 MG Tab, Take 1 Tab by mouth every four hours as needed., Disp: 90 Tab, Rfl: 0  •  mometasone (NASONEX) 50 MCG/ACT nasal spray, Spray 2 Sprays in nose every day., Disp: 1 Inhaler, Rfl: 0  •  ondansetron (ZOFRAN ODT) 4 MG TABLET DISPERSIBLE, Take 1 Tab by mouth every 8 hours as needed for Nausea/Vomiting., Disp: 10 Tab, Rfl: 0  •  tramadol (ULTRAM) 50 MG Tab, TAKE 1 TABLET BY MOUTH EVERY 8 HOURS AS NEDDED, Disp: 90 Tab, Rfl: 0  •  albuterol 108 (90 BASE) MCG/ACT Aero Soln inhalation aerosol, Inhale 2 Puffs by mouth every 6 hours as needed for Shortness of Breath., Disp: 8.5 g, Rfl: 3  •  gabapentin (NEURONTIN) 300 MG Cap, Take 1 Cap by mouth 2 Times a Day., Disp: 180 Cap, Rfl: 1  ALLERGIES:   Allergies   Allergen Reactions   • Aspirin      Stomach pain   • Ibuprofen      Stomach pain   • Tape Rash     SURGHX:   Past Surgical History   Procedure Laterality Date   • Hysterectomy, vaginal     • Cyst excision       right breast   • Tonsillectomy     • Pr throat surgery procedure unlisted     • Knee arthroplasty total  3/18/2009     Performed by SURI PARK at [facility]     SOCHX:  reports that she quit smoking about 15 years ago. Her smoking use included Cigarettes. She has a 60 pack-year smoking history. She has never used smokeless tobacco. She reports that she drinks alcohol. She reports that she does not use illicit drugs.  FH: family history includes Cancer in her brother; Dementia in her mother; Heart Disease in her mother.       Objective:     /80 mmHg  Pulse 80  Temp(Src) 36.6 °C (97.9 °F)  Resp 16  Wt 72.576 kg (160 lb)  SpO2 91%     Physical Exam   Constitutional: She appears  well-developed and well-nourished. She is cooperative.  Non-toxic appearance. She does not have a sickly appearance. No distress.   HENT:   Right Ear: Decreased hearing is noted.   Left Ear: Hearing, tympanic membrane and ear canal normal.   Nose: Mucosal edema present. No rhinorrhea. Right sinus exhibits maxillary sinus tenderness and frontal sinus tenderness. Left sinus exhibits maxillary sinus tenderness and frontal sinus tenderness.   Mouth/Throat: Uvula is midline. No trismus in the jaw. No uvula swelling. Posterior oropharyngeal erythema present. No oropharyngeal exudate or posterior oropharyngeal edema.   Near complete right ear canal cerumen impaction   Eyes: Conjunctivae are normal.   Neck: Trachea normal. Neck supple.   Cardiovascular: Normal rate, regular rhythm and normal heart sounds.    Pulmonary/Chest: Effort normal. She has no decreased breath sounds. She has no wheezes. She has rhonchi. She has no rales.   Rhonchi clear with cough   Lymphadenopathy:     She has no cervical adenopathy.   Neurological: She is alert.   Skin: Skin is warm and dry.   Psychiatric: She has a normal mood and affect.               Assessment/Plan:     1. Acute recurrent pansinusitis  Due to re-sickening  - amoxicillin-clavulanate (AUGMENTIN) 875-125 MG Tab; Take 1 Tab by mouth 2 times a day.  Dispense: 14 Tab; Refill: 0  - predniSONE (DELTASONE) 20 MG Tab; Take 2 Tabs by mouth every day.  Dispense: 10 Tab; Refill: 0    2. Chronic nonallergic rhinitis  Advise continue nasal saline irrigation, inhaled nasal steroid    3. Impacted cerumen, right ear  lavage

## 2017-03-15 ENCOUNTER — APPOINTMENT (OUTPATIENT)
Dept: PLASTIC SURGERY | Facility: IMAGING CENTER | Age: 67
End: 2017-03-15

## 2017-03-29 DIAGNOSIS — G89.29 CHRONIC LOW BACK PAIN WITHOUT SCIATICA, UNSPECIFIED BACK PAIN LATERALITY: ICD-10-CM

## 2017-03-29 DIAGNOSIS — M54.50 CHRONIC LOW BACK PAIN WITHOUT SCIATICA, UNSPECIFIED BACK PAIN LATERALITY: ICD-10-CM

## 2017-03-29 RX ORDER — TRAMADOL HYDROCHLORIDE 50 MG/1
TABLET ORAL
Qty: 90 TAB | Refills: 0 | Status: SHIPPED
Start: 2017-03-29 | End: 2017-06-21 | Stop reason: SDUPTHER

## 2017-05-03 ENCOUNTER — PATIENT OUTREACH (OUTPATIENT)
Dept: HEALTH INFORMATION MANAGEMENT | Facility: OTHER | Age: 67
End: 2017-05-03

## 2017-05-03 NOTE — PROGRESS NOTES
Outbound call to patient for med rec. Left  for patient to call [phone].  1st attempt to reach patient.

## 2017-05-04 ENCOUNTER — PATIENT OUTREACH (OUTPATIENT)
Dept: HEALTH INFORMATION MANAGEMENT | Facility: OTHER | Age: 67
End: 2017-05-04

## 2017-05-04 NOTE — PROGRESS NOTES
Outbound call to patient for medication reconciliation.  Updated allergy and medication lists.    Patient demonstrates adherence to medication schedule and understanding of indications for medications.    Patient denies any side effects from medications.     Has appropriate medication regimen for current disease states. Does not monitor BP at home since she has not had any issues with HTN in the past.  Encouraged patient to begin monitoring to establish a personal baseline.  Patient is agreeable.     Patient suffers from sciatic pain.  She feels gabapentin helps some, but she is uncomfortable much of the time.  Patient is doing exercises to help.  Offered other alternatives such as acupuncture and spinal decompression by a chiropractor.     Patient can afford medications.    Patient is up to date with vaccinations.     Patient exercises regularly and does not smoke tobacco. (quit in 2007)    Patient has [facility] contact info should any further assistance be needed.

## 2017-06-21 RX ORDER — TRAMADOL HYDROCHLORIDE 50 MG/1
TABLET ORAL
Qty: 90 TAB | Refills: 0 | Status: SHIPPED
Start: 2017-06-21 | End: 2017-09-22 | Stop reason: SDUPTHER

## 2017-07-11 ENCOUNTER — OFFICE VISIT (OUTPATIENT)
Dept: MEDICAL GROUP | Facility: PHYSICIAN GROUP | Age: 67
End: 2017-07-11
Payer: MEDICARE

## 2017-07-11 VITALS
WEIGHT: 169 LBS | DIASTOLIC BLOOD PRESSURE: 78 MMHG | BODY MASS INDEX: 29.94 KG/M2 | OXYGEN SATURATION: 95 % | HEART RATE: 81 BPM | SYSTOLIC BLOOD PRESSURE: 132 MMHG | TEMPERATURE: 97.5 F

## 2017-07-11 DIAGNOSIS — Z00.00 ROUTINE HEALTH MAINTENANCE: ICD-10-CM

## 2017-07-11 DIAGNOSIS — R53.82 CHRONIC FATIGUE: ICD-10-CM

## 2017-07-11 DIAGNOSIS — R09.89 SINUS COMPLAINT: ICD-10-CM

## 2017-07-11 DIAGNOSIS — E55.9 VITAMIN D INSUFFICIENCY: ICD-10-CM

## 2017-07-11 DIAGNOSIS — K12.1 ORAL ULCER: ICD-10-CM

## 2017-07-11 DIAGNOSIS — M54.42 CHRONIC LEFT-SIDED LOW BACK PAIN WITH LEFT-SIDED SCIATICA: ICD-10-CM

## 2017-07-11 DIAGNOSIS — F41.9 INSOMNIA SECONDARY TO ANXIETY: ICD-10-CM

## 2017-07-11 DIAGNOSIS — Z79.891 CHRONIC USE OF OPIATE DRUGS THERAPEUTIC PURPOSES: ICD-10-CM

## 2017-07-11 DIAGNOSIS — E78.5 DYSLIPIDEMIA: ICD-10-CM

## 2017-07-11 DIAGNOSIS — Z87.891 PERSONAL HISTORY OF SMOKING: ICD-10-CM

## 2017-07-11 DIAGNOSIS — L98.9 SKIN LESION OF CHEST WALL: ICD-10-CM

## 2017-07-11 DIAGNOSIS — G89.29 CHRONIC LEFT-SIDED LOW BACK PAIN WITH LEFT-SIDED SCIATICA: ICD-10-CM

## 2017-07-11 DIAGNOSIS — F51.05 INSOMNIA SECONDARY TO ANXIETY: ICD-10-CM

## 2017-07-11 PROCEDURE — 99214 OFFICE O/P EST MOD 30 MIN: CPT | Performed by: NURSE PRACTITIONER

## 2017-07-11 RX ORDER — LIDOCAINE 50 MG/G
1 PATCH TOPICAL EVERY 24 HOURS
Qty: 30 PATCH | Refills: 3 | Status: SHIPPED | OUTPATIENT
Start: 2017-07-11 | End: 2018-11-12 | Stop reason: SDUPTHER

## 2017-07-11 RX ORDER — DEXAMETHASONE 4 MG/1
4 TABLET ORAL 2 TIMES DAILY
Qty: 6 TAB | Refills: 0 | Status: SHIPPED | OUTPATIENT
Start: 2017-07-11 | End: 2017-07-31

## 2017-07-11 RX ORDER — TRIAMCINOLONE ACETONIDE 0.1 %
PASTE (GRAM) DENTAL
Qty: 1 TUBE | Refills: 0 | Status: SHIPPED | OUTPATIENT
Start: 2017-07-11 | End: 2017-11-15

## 2017-07-11 ASSESSMENT — PAIN SCALES - GENERAL: PAINLEVEL: 5=MODERATE PAIN

## 2017-07-11 ASSESSMENT — LIFESTYLE VARIABLES: HISTORY_ALCOHOL_USE: 0

## 2017-07-11 ASSESSMENT — ENCOUNTER SYMPTOMS: DEPRESSION: 0

## 2017-07-11 NOTE — ASSESSMENT & PLAN NOTE
New problem. Reports that over this past 3 weeks she has been experiencing left sinus issues intermittently. She feels like there is congestion and intermittent thick nasal drainage. There is associated runny nose and cough with thick mucus. She does additionally have some itching of the eyes. Denies any sore throat, ear pain, eye redness or drainage, or sinus pressure. No F/C. using Flonase regularly

## 2017-07-11 NOTE — ASSESSMENT & PLAN NOTE
Last dose of Tylenol #3 one week ago, last tramadol today     States really tries to stretch her pain out     Analgesia: 3-6/10  Activity level: good  Adverse events: none  Aberrant behavior: none  Affect and mood: calm and pleasant     Nonnarcotic treatments that are being used: gabapentin   Pain management agreement initiated and signed on: 2/1/17  Most recent urine drug screen done never    Opiate risk score: 1    Total daily opiate dosage: max morphine 19 mEq daily

## 2017-07-11 NOTE — ASSESSMENT & PLAN NOTE
Long-standing problem managed with physical therapy exercises at home and chronic opioid use with tramadol during the day and Tylenol No. 3 at night. Follows Dr. Soria for procedural management of pain

## 2017-07-11 NOTE — PROGRESS NOTES
Subjective:     Chief Complaint   Patient presents with   • Follow-Up       HPI  Kayla Jensen is a 66 y.o. female here today for routine follow-up and has multiple issues to address today. She reports a nonhealing sore on her chest that is tender to the touch. Hydrocortisone cream has not helped at all. She would like to see dermatology    Sinus complaint  New problem. Reports that over this past 3 weeks she has been experiencing left sinus issues intermittently. She feels like there is congestion and intermittent thick nasal drainage. There is associated runny nose and cough with thick mucus. She does additionally have some itching of the eyes. Denies any sore throat, ear pain, eye redness or drainage, or sinus pressure. No F/C. using Flonase regularly     Insomnia secondary to anxiety  She is dealing with some stressful family matters and having trouble sleeping due to not being able to turn her mind off. She would prefer to not take any sleeping pills and is wondering about natural route    Chronic use of opiate drugs therapeutic purposes  Last dose of Tylenol #3 one week ago, last tramadol today     States really tries to stretch her pain out     Analgesia: 3-6/10  Activity level: good  Adverse events: none  Aberrant behavior: none  Affect and mood: calm and pleasant     Nonnarcotic treatments that are being used: gabapentin   Pain management agreement initiated and signed on: 2/1/17  Most recent urine drug screen done never    Opiate risk score: 1    Total daily opiate dosage: max morphine 19 mEq daily         Vitamin D insufficiency  Ongoing problems currently managed with supplementation. Last lab with level of 28 March 2016    Personal history of smoking  Patient will be due for her annual cancer screening. She is 66 years old. No history of lung cancer. Quit smoking in 2002, but has history of 40 years of smoking 1.5 packs per day. (quit 15 years ago) 60 pack year smoker. She is without symptoms  of lung cancer. Last CT 7/20/16 for lung cancer screening     Chronic back pain  Long-standing problem managed with physical therapy exercises at home and chronic opioid use with tramadol during the day and Tylenol No. 3 at night. Follows Dr. Soria for procedural management of pain       Diagnoses of Chronic left-sided low back pain with left-sided sciatica, Chronic use of opiate drugs therapeutic purposes, Vitamin D insufficiency, Dyslipidemia, Sinus complaint, Insomnia secondary to anxiety, Skin lesion of chest wall, Chronic fatigue, Oral ulcer, Personal history of smoking, and Routine health maintenance were pertinent to this visit.    Allergies: Aspirin; Ibuprofen; and Tape  Current medicines (including changes today)  Current Outpatient Prescriptions   Medication Sig Dispense Refill   • dexamethasone (DECADRON) 4 MG Tab Take 1 Tab by mouth 2 times a day. 6 Tab 0   • acetaminophen-codeine #3 (TYLENOL #3) 300-30 MG Tab Take 1-2 Tabs by mouth at bedtime as needed for Moderate Pain. 90 Tab 0   • triamcinolone acetonide-orabase (KENALOG IN ORABASE) 0.1 % Paste Apply twice daily to ulcer on tongue 1 Tube 0   • lidocaine (LIDODERM) 5 % Patch Apply 1 Patch to skin as directed every 24 hours. 30 Patch 3   • tramadol (ULTRAM) 50 MG Tab TAKE 1 TABLET BY MOUTH EVERY 8 HOURS AS NEEDED 90 Tab 0   • fexofenadine-pseudoephedrine (ALLEGRA-D)  MG per tablet TAKE 1 TABLET BY MOUTH 2 TIMES A DAY. 180 Tab 3   • albuterol 108 (90 BASE) MCG/ACT Aero Soln inhalation aerosol Inhale 2 Puffs by mouth every 6 hours as needed for Shortness of Breath. 8.5 g 3   • gabapentin (NEURONTIN) 300 MG Cap Take 1 Cap by mouth 2 Times a Day. 180 Cap 1   • triamcinolone (NASACORT) 55 MCG/ACT nasal inhaler Spray 2 Sprays in nose every day. 1 Inhaler 0     No current facility-administered medications for this visit.       She  has a past medical history of Heart burn; Unspecified hemorrhagic conditions; Personal history of venous thrombosis and  embolism; Allergy; and GERD (gastroesophageal reflux disease). She also has no past medical history of Infectious disease, Psychiatric problem, Pacemaker, Angina, Heart valve disease, Rheumatic fever, Bronchitis, Pneumonia, Jaundice, Indigestion, Unspecified urinary incontinence, Dialysis, Other specified symptom associated with female genital organs, or Backpain.    Health Maintenance: mammo and dexa due, UDS due     ROS  As stated in HPI and additionally  Constitutional: +chronic fatigue and insomnia. No F/C, night sweats, weight gain or loss  Head/Neck: No headache, dizziness, or haile enlargement  Eyes: No pain, redness, discharge  Ears: No pain  Nose: see HPI  Mouth/Throat: +sore right side of tongue x1 week. No sore throat  Lungs: +cough with thick sputum this past week. Otherwise no sob, dyspnea, wheezing, hemoptysis  Cardiac: No chest pain, palpitations, syncope or near syncope, MADSION, LE edema  Endo: No heat or cold intolerance, excessively dry skin, sweating, polydipsia, polyuria, or polyphagia.         Objective:     Blood pressure 132/78, pulse 81, temperature 36.4 °C (97.5 °F), weight 76.658 kg (169 lb), SpO2 95 %. Body mass index is 29.94 kg/(m^2).  Physical Exam:  General: Alert, oriented, in no acute distress.  Eye contact is good, speech goal directed, affect calm  CNs grossly intact.  HEENT: conjunctiva non-injected, sclera non-icteric, EOMs intact.  No lid edema or eye drainage.   Pinna normal without skin lesions. TM pearly murphy. Gross hearing intact.  Nasal turbinates without edema or drainage.   No TTP over sinuses  Oral mucous membranes pink and moist. Right side of tongue with small ulceration. Oropharynx without erythema, or exudate.   Neck: Supple. No adenopathy or masses in the cervical or supraclavicular regions.   Lungs: unlabored. clear to auscultation bilaterally with good excursion.  CV: regular rate and rhythm. No murmurs.  Ext: no edema, normal color and temperature.   Skin: very  small skin lesion, appears as healing scab to left chest wall  Gait steady.     Assessment and Plan:   Assessment/Plan:  1. Chronic left-sided low back pain with left-sided sciatica  Ongoing problem, she would like to try lidocaine patches. Continue f/u with Dr. Soria and home PT exercises  - acetaminophen-codeine #3 (TYLENOL #3) 300-30 MG Tab; Take 1-2 Tabs by mouth at bedtime as needed for Moderate Pain.  Dispense: 90 Tab; Refill: 0  - lidocaine (LIDODERM) 5 % Patch; Apply 1 Patch to skin as directed every 24 hours.  Dispense: 30 Patch; Refill: 3    2. Chronic use of opiate drugs therapeutic purposes  UDS provided today. Obtained and reviewed the patient utilization report state pharmacy database on 7/11/2017.  Based on assessment of report prescription for Tylenol #3 and Tramadol is medically necessary.  - [facility] PAIN MANAGEMENT SCREEN; Future    3. Vitamin D insufficiency  Check labs   - VITAMIN D,25 HYDROXY; Future    4. Dyslipidemia  Has hx of dyslipidemia, not on statin, labs due for monitoring   - LIPID PROFILE; Future    5. Sinus complaint  Will try to avoid antibiotic as it could be allergic in nature. Monitor for response. If fail, start oral antibiotic. Use mucinex   - dexamethasone (DECADRON) 4 MG Tab; Take 1 Tab by mouth 2 times a day.  Dispense: 6 Tab; Refill: 0    6. Insomnia secondary to anxiety  Enc lavender essential oil and sleepytime tea, relaxation techniques    7. Skin lesion of chest wall  Enc her to schedule biopsy with myself if lesion persists   - REFERRAL TO DERMATOLOGY    8. Chronic fatigue  Ongoing issue, discussed likely r/t stress. Will check labs anyway   - VITAMIN B12; Future  - CBC WITH DIFFERENTIAL; Future  - COMP METABOLIC PANEL; Future  - TSH; Future    9. Oral ulcer  - triamcinolone acetonide-orabase (KENALOG IN ORABASE) 0.1 % Paste; Apply twice daily to ulcer on tongue  Dispense: 1 Tube; Refill: 0    10. Personal history of smoking  Will e-mail RN for lung cancer  screening program to check if patient still eligible being it is year 15. Otherwise, she is eligible and ready for last CT for lung cancer screening.   - CT LUNG CANCER SCREENING    11. Routine health maintenance  - CBC WITH DIFFERENTIAL; Future  - COMP METABOLIC PANEL; Future  - TSH; Future       Follow up:  Return in about 6 months (around 1/11/2018).    Educated in proper administration of medication(s) ordered today including safety, possible SE, risks, benefits, rationale and alternatives to therapy.   Supportive care, differential diagnoses, and indications for immediate follow-up discussed with patient.    Pathogenesis of diagnosis discussed including typical length and natural progression.    Instructed to return to clinic or nearest emergency department for any change in condition, further concerns, or worsening of symptoms.  Patient states understanding of the plan of care and discharge instructions.      Please note that this dictation was created using voice recognition software. I have made every reasonable attempt to correct obvious errors, but I expect that there are errors of grammar and possibly content that I did not discover before finalizing the note.    Followup: Return in about 6 months (around 1/11/2018). sooner should new symptoms or problems arise.

## 2017-07-11 NOTE — MR AVS SNAPSHOT
Kayla Jensen   2017 7:40 AM   Office Visit   MRN: [id number]    Department:  [facility]   Dept Phone:  [phone]    Description:  Female : 1950   Provider:  KATY Gay           Reason for Visit     Follow-Up           Allergies as of 2017     Allergen Noted Reactions    Aspirin 2009       Stomach pain    Ibuprofen 2009       Stomach pain    Tape 2009   Rash      You were diagnosed with     Chronic left-sided low back pain with left-sided sciatica   [2719065]       Chronic use of opiate drugs therapeutic purposes   [0380574]       Vitamin D insufficiency   [412169]       Dyslipidemia   [322175]       Routine health maintenance   [865689]       Sinus complaint   [261987]       Insomnia secondary to anxiety   [405104]       Skin lesion of chest wall   [034712]       Chronic fatigue   [836350]         Vital Signs     Blood Pressure Pulse Temperature Weight Oxygen Saturation Smoking Status    132/78 mmHg 81 36.4 °C (97.5 °F) 76.658 kg (169 lb) 95% Former Smoker      Basic Information     Date Of Birth Sex Race Ethnicity Preferred Language    1950 Female White Non- English      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   3/18/2016 - Present    Personal history of smoking Z87.891   3/18/2016 - Present    Chronic use of opiate drugs therapeutic purposes Z79.891   2017 - Present    Vitamin D insufficiency E55.9   2017 - Present    Sinus complaint R09.89   2017 - Present    Insomnia secondary to anxiety F41.9, F51.05   2017 - Present      Health Maintenance        Date Due Completion Dates    BONE DENSITY 2014 ---    MAMMOGRAM 2017    IMM INFLUENZA (1) 2017, 10/2/2014    IMM PNEUMOCOCCAL 65+ (ADULT) LOW/MEDIUM RISK SERIES (2 of  2 - PPSV23) 11/29/2017 11/29/2016    COLONOSCOPY 11/29/2019 11/29/2009 (Done)    Override on 11/29/2009: Done ([facility])    IMM DTaP/Tdap/Td Vaccine (2 - Td) 11/29/2026 11/29/2016            Current Immunizations     13-VALENT PCV PREVNAR 11/29/2016    Influenza Vaccine Adult HD 9/6/2016 10:36 AM    Influenza Vaccine Quad Inj (Pf) 10/2/2014    SHINGLES VACCINE 11/29/2016    Tdap Vaccine 11/29/2016      Below and/or attached are the medications your provider expects you to take. Review all of your home medications and newly ordered medications with your provider and/or pharmacist. Follow medication instructions as directed by your provider and/or pharmacist. Please keep your medication list with you and share with your provider. Update the information when medications are discontinued, doses are changed, or new medications (including over-the-counter products) are added; and carry medication information at all times in the event of emergency situations     Allergies:  ASPIRIN - (reactions not documented)     IBUPROFEN - (reactions not documented)     TAPE - Rash               Medications  Valid as of: July 11, 2017 -  8:37 AM    Generic Name Brand Name Tablet Size Instructions for use    Acetaminophen-Codeine (Tab) TYLENOL #3 300-30 MG Take 1-2 Tabs by mouth at bedtime as needed for Moderate Pain.        Albuterol Sulfate (Aero Soln) albuterol 108 (90 BASE) MCG/ACT Inhale 2 Puffs by mouth every 6 hours as needed for Shortness of Breath.        Dexamethasone (Tab) DECADRON 4 MG Take 1 Tab by mouth 2 times a day.        Fexofenadine-Pseudoephedrine (TABLET SR 12 HR) ALLEGRA-D  MG TAKE 1 TABLET BY MOUTH 2 TIMES A DAY.        Gabapentin (Cap) NEURONTIN 300 MG Take 1 Cap by mouth 2 Times a Day.        TraMADol HCl (Tab) ULTRAM 50 MG TAKE 1 TABLET BY MOUTH EVERY 8 HOURS AS NEEDED        Triamcinolone Acetonide (Aerosol) NASACORT 55 MCG/ACT [place] 2 Sprays in nose every day.        .                    Medicines prescribed today were sent to:     [id number] [place] [facility] [place] - [address]    [address] [place] [address]    Phone: [phone] Fax: [phone]    Open 24 Hours?: No    [id number] [place], [place] - [address]    [address] [place] [address]    Phone: [phone] Fax: [phone]    Open 24 Hours?: No      Medication refill instructions:       If your prescription bottle indicates you have medication refills left, it is not necessary to call your provider’s office. Please contact your pharmacy and they will refill your medication.    If your prescription bottle indicates you do not have any refills left, you may request refills at any time through one of the following ways: The online [employer] system (except Urgent Care), by calling your provider’s office, or by asking your pharmacy to contact your provider’s office with a refill request. Medication refills are processed only during regular business hours and may not be available until the next business day. Your provider may request additional information or to have a follow-up visit with you prior to refilling your medication.   *Please Note: Medication refills are assigned a new Rx number when refilled electronically. Your pharmacy may indicate that no refills were authorized even though a new prescription for the same medication is available at the pharmacy. Please request the medicine by name with the pharmacy before contacting your provider for a refill.        Your To Do List     Future Labs/Procedures Complete By Expires    CBC WITH DIFFERENTIAL  As directed 7/12/2018    COMP METABOLIC PANEL  As directed 7/12/2018    LIPID PROFILE  As directed 7/12/2018    [facility] PAIN MANAGEMENT SCREEN  As directed 7/11/2018    Comments:    Current Meds (name, sig, last dose):   Current outpatient prescriptions:   •  tramadol, TAKE 1 TABLET BY MOUTH EVERY 8 HOURS AS NEEDED, 7/11/2017  •   fexofenadine-pseudoephedrine, TAKE 1 TABLET BY MOUTH 2 TIMES A DAY., 7/11/2017  •  albuterol, 2 Puff, Inhalation, Q6HRS PRN, 7/11/2017  •  gabapentin, 300 mg, Oral, BID, 7/11/2017  •  triamcinolone, 2 Spray, Nasal, DAILY          TSH  As directed 7/12/2018    VITAMIN B12  As directed 7/12/2018    VITAMIN D,25 HYDROXY  As directed 7/12/2018      Referral     A referral request has been sent to our patient care coordination department. Please allow 3-5 business days for us to process this request and contact you either by phone or mail. If you do not hear from us by the 5th business day, please call us at [phone].           [employer] Access Code: Activation code not generated  Current [employer] Status: Active

## 2017-07-11 NOTE — ASSESSMENT & PLAN NOTE
She is dealing with some stressful family matters and having trouble sleeping due to not being able to turn her mind off. She would prefer to not take any sleeping pills and is wondering about natural route

## 2017-07-11 NOTE — ASSESSMENT & PLAN NOTE
Patient will be due for her annual cancer screening. She is 66 years old. No history of lung cancer. Quit smoking in 2002, but has history of 40 years of smoking 1.5 packs per day. (quit 15 years ago) 60 pack year smoker. She is without symptoms of lung cancer.

## 2017-07-12 ENCOUNTER — HOSPITAL ENCOUNTER (OUTPATIENT)
Dept: LAB | Facility: MEDICAL CENTER | Age: 67
End: 2017-07-12
Attending: NURSE PRACTITIONER
Payer: MEDICARE

## 2017-07-12 DIAGNOSIS — E78.5 DYSLIPIDEMIA: ICD-10-CM

## 2017-07-12 DIAGNOSIS — Z00.00 ROUTINE HEALTH MAINTENANCE: ICD-10-CM

## 2017-07-12 DIAGNOSIS — E55.9 VITAMIN D INSUFFICIENCY: ICD-10-CM

## 2017-07-12 DIAGNOSIS — R53.82 CHRONIC FATIGUE: ICD-10-CM

## 2017-07-12 PROBLEM — E78.00 PURE HYPERCHOLESTEROLEMIA: Status: ACTIVE | Noted: 2017-07-12

## 2017-07-12 LAB
25(OH)D3 SERPL-MCNC: 20 NG/ML (ref 30–100)
ALBUMIN SERPL BCP-MCNC: 4.1 G/DL (ref 3.2–4.9)
ALBUMIN/GLOB SERPL: 1.6 G/DL
ALP SERPL-CCNC: 112 U/L (ref 30–99)
ALT SERPL-CCNC: 15 U/L (ref 2–50)
ANION GAP SERPL CALC-SCNC: 5 MMOL/L (ref 0–11.9)
AST SERPL-CCNC: 16 U/L (ref 12–45)
BASOPHILS # BLD AUTO: 0.8 % (ref 0–1.8)
BASOPHILS # BLD: 0.07 K/UL (ref 0–0.12)
BILIRUB SERPL-MCNC: 0.5 MG/DL (ref 0.1–1.5)
BUN SERPL-MCNC: 22 MG/DL (ref 8–22)
CALCIUM SERPL-MCNC: 9.1 MG/DL (ref 8.5–10.5)
CHLORIDE SERPL-SCNC: 106 MMOL/L (ref 96–112)
CHOLEST SERPL-MCNC: 191 MG/DL (ref 100–199)
CO2 SERPL-SCNC: 29 MMOL/L (ref 20–33)
CREAT SERPL-MCNC: 0.86 MG/DL (ref 0.5–1.4)
EOSINOPHIL # BLD AUTO: 0.24 K/UL (ref 0–0.51)
EOSINOPHIL NFR BLD: 2.9 % (ref 0–6.9)
ERYTHROCYTE [DISTWIDTH] IN BLOOD BY AUTOMATED COUNT: 50 FL (ref 35.9–50)
GFR SERPL CREATININE-BSD FRML MDRD: >60 ML/MIN/1.73 M 2
GLOBULIN SER CALC-MCNC: 2.5 G/DL (ref 1.9–3.5)
GLUCOSE SERPL-MCNC: 102 MG/DL (ref 65–99)
HCT VFR BLD AUTO: 46.9 % (ref 37–47)
HDLC SERPL-MCNC: 57 MG/DL
HGB BLD-MCNC: 14.9 G/DL (ref 12–16)
IMM GRANULOCYTES # BLD AUTO: 0.03 K/UL (ref 0–0.11)
IMM GRANULOCYTES NFR BLD AUTO: 0.4 % (ref 0–0.9)
LDLC SERPL CALC-MCNC: 112 MG/DL
LYMPHOCYTES # BLD AUTO: 1.93 K/UL (ref 1–4.8)
LYMPHOCYTES NFR BLD: 22.9 % (ref 22–41)
MCH RBC QN AUTO: 31 PG (ref 27–33)
MCHC RBC AUTO-ENTMCNC: 31.8 G/DL (ref 33.6–35)
MCV RBC AUTO: 97.5 FL (ref 81.4–97.8)
MONOCYTES # BLD AUTO: 0.68 K/UL (ref 0–0.85)
MONOCYTES NFR BLD AUTO: 8.1 % (ref 0–13.4)
NEUTROPHILS # BLD AUTO: 5.46 K/UL (ref 2–7.15)
NEUTROPHILS NFR BLD: 64.9 % (ref 44–72)
NRBC # BLD AUTO: 0 K/UL
NRBC BLD AUTO-RTO: 0 /100 WBC
PLATELET # BLD AUTO: 216 K/UL (ref 164–446)
PMV BLD AUTO: 9.6 FL (ref 9–12.9)
POTASSIUM SERPL-SCNC: 4.3 MMOL/L (ref 3.6–5.5)
PROT SERPL-MCNC: 6.6 G/DL (ref 6–8.2)
RBC # BLD AUTO: 4.81 M/UL (ref 4.2–5.4)
SODIUM SERPL-SCNC: 140 MMOL/L (ref 135–145)
TRIGL SERPL-MCNC: 109 MG/DL (ref 0–149)
TSH SERPL DL<=0.005 MIU/L-ACNC: 0.91 UIU/ML (ref 0.3–3.7)
VIT B12 SERPL-MCNC: 305 PG/ML (ref 211–911)
WBC # BLD AUTO: 8.4 K/UL (ref 4.8–10.8)

## 2017-07-12 PROCEDURE — 82306 VITAMIN D 25 HYDROXY: CPT

## 2017-07-12 PROCEDURE — 82607 VITAMIN B-12: CPT

## 2017-07-12 PROCEDURE — 84443 ASSAY THYROID STIM HORMONE: CPT

## 2017-07-12 PROCEDURE — 80053 COMPREHEN METABOLIC PANEL: CPT

## 2017-07-12 PROCEDURE — 36415 COLL VENOUS BLD VENIPUNCTURE: CPT

## 2017-07-12 PROCEDURE — 80061 LIPID PANEL: CPT

## 2017-07-12 PROCEDURE — 85025 COMPLETE CBC W/AUTO DIFF WBC: CPT

## 2017-07-13 ENCOUNTER — PATIENT MESSAGE (OUTPATIENT)
Dept: MEDICAL GROUP | Facility: PHYSICIAN GROUP | Age: 67
End: 2017-07-13

## 2017-07-19 ENCOUNTER — TELEPHONE (OUTPATIENT)
Dept: MEDICAL GROUP | Facility: PHYSICIAN GROUP | Age: 67
End: 2017-07-19

## 2017-07-20 ENCOUNTER — HOSPITAL ENCOUNTER (OUTPATIENT)
Dept: RADIOLOGY | Facility: MEDICAL CENTER | Age: 67
End: 2017-07-20
Attending: FAMILY MEDICINE
Payer: MEDICARE

## 2017-07-20 ENCOUNTER — OFFICE VISIT (OUTPATIENT)
Dept: URGENT CARE | Facility: PHYSICIAN GROUP | Age: 67
End: 2017-07-20
Payer: MEDICARE

## 2017-07-20 VITALS
TEMPERATURE: 98.3 F | BODY MASS INDEX: 29.06 KG/M2 | DIASTOLIC BLOOD PRESSURE: 78 MMHG | HEART RATE: 71 BPM | HEIGHT: 63 IN | OXYGEN SATURATION: 95 % | SYSTOLIC BLOOD PRESSURE: 142 MMHG | WEIGHT: 164 LBS

## 2017-07-20 DIAGNOSIS — R10.13 EPIGASTRIC PAIN: ICD-10-CM

## 2017-07-20 PROCEDURE — 74000 DX-ABDOMEN-1 VIEW: CPT

## 2017-07-20 PROCEDURE — 99214 OFFICE O/P EST MOD 30 MIN: CPT | Performed by: FAMILY MEDICINE

## 2017-07-20 RX ORDER — DOCUSATE SODIUM 100 MG/1
100 CAPSULE, LIQUID FILLED ORAL 2 TIMES DAILY
Qty: 30 CAP | Refills: 0 | Status: SHIPPED | OUTPATIENT
Start: 2017-07-20 | End: 2017-11-15

## 2017-07-20 RX ORDER — SIMETHICONE 180 MG
1 CAPSULE ORAL 4 TIMES DAILY PRN
Qty: 60 CAP | Refills: 0 | Status: SHIPPED | OUTPATIENT
Start: 2017-07-20 | End: 2017-11-15

## 2017-07-20 RX ORDER — DICYCLOMINE HYDROCHLORIDE 10 MG/1
10 CAPSULE ORAL
Qty: 30 CAP | Refills: 0 | Status: SHIPPED | OUTPATIENT
Start: 2017-07-20 | End: 2017-11-15

## 2017-07-20 ASSESSMENT — ENCOUNTER SYMPTOMS
ROS GI COMMENTS: 1
CHILLS: 0
FEVER: 0
NAUSEA: 1
VOMITING: 0
ABDOMINAL PAIN: 1

## 2017-07-20 NOTE — TELEPHONE ENCOUNTER
Please call patient to inform her of my chart message that she has not read yet as below:    Eric Meredith,     I just wanted to let you know that the CT scan for lung cancer screening actually will not be covered for you this year. Unfortunately, they will only pay for this screening test if it has been within 15 years of quitting smoking. Date reported for you with March 2002, therefore we are about 4 months past. I did clarify with the lung cancer screening  and she did double check with her higher up as well, but unfortunately it will not be covered. I wanted to let you be aware of this prior to doing the testing so that you do not end up with a very significantly large bill.     XAVI Gay.

## 2017-07-20 NOTE — TELEPHONE ENCOUNTER
Spoke with patient to advise her of message. She states she was told that as long as they participate in the study her  and herself would be covered. Pt states they both quit march of 2000. I told her to call Insurance and program to get an update before have the screening done or they will get a bill for it.

## 2017-07-20 NOTE — PATIENT INSTRUCTIONS
Abdominal Pain (Nonspecific)  Your exam might not show the exact reason you have abdominal pain. Since there are many different causes of abdominal pain, another checkup and more tests may be needed. It is very important to follow up for lasting (persistent) or worsening symptoms. A possible cause of abdominal pain in any person who still has his or her appendix is acute appendicitis. Appendicitis is often hard to diagnose. Normal blood tests, urine tests, ultrasound, and CT scans do not completely rule out early appendicitis or other causes of abdominal pain. Sometimes, only the changes that happen over time will allow appendicitis and other causes of abdominal pain to be determined. Other potential problems that may require surgery may also take time to become more apparent. Because of this, it is important that you follow all of the instructions below.  HOME CARE INSTRUCTIONS   · Rest as much as possible.   · Do not eat solid food until your pain is gone.   · While adults or children have pain: A diet of water, weak decaffeinated tea, broth or bouillon, gelatin, oral rehydration solutions (ORS), frozen ice pops, or ice chips may be helpful.   · When pain is gone in adults or children: Start a light diet (dry toast, crackers, applesauce, or white rice). Increase the diet slowly as long as it does not bother you. Eat no dairy products (including cheese and eggs) and no spicy, fatty, fried, or high-fiber foods.   · Use no alcohol, caffeine, or cigarettes.   · Take your regular medicines unless your caregiver told you not to.   · Take any prescribed medicine as directed.   · Only take over-the-counter or prescription medicines for pain, discomfort, or fever as directed by your caregiver. Do not give aspirin to children.   If your caregiver has given you a follow-up appointment, it is very important to keep that appointment. Not keeping the appointment could result in a permanent injury and/or lasting (chronic) pain  and/or disability. If there is any problem keeping the appointment, you must call to reschedule.   SEEK IMMEDIATE MEDICAL CARE IF:   · Your pain is not gone in 24 hours.   · Your pain becomes worse, changes location, or feels different.   · You or your child has an oral temperature above 102° F (38.9° C), not controlled by medicine.   · Your baby is older than 3 months with a rectal temperature of 102° F (38.9° C) or higher.   · Your baby is 3 months old or younger with a rectal temperature of 100.4° F (38° C) or higher.   · You have shaking chills.   · You keep throwing up (vomiting) or cannot drink liquids.   · There is blood in your vomit or you see blood in your bowel movements.   · Your bowel movements become dark or black.   · You have frequent bowel movements.   · Your bowel movements stop (become blocked) or you cannot pass gas.   · You have bloody, frequent, or painful urination.   · You have yellow discoloration in the skin or whites of the eyes.   · Your stomach becomes bloated or bigger.   · You have dizziness or fainting.   · You have chest or back pain.   MAKE SURE YOU:   · Understand these instructions.   · Will watch your condition.   · Will get help right away if you are not doing well or get worse.   Document Released: 12/18/2006 Document Revised: 03/11/2013 Document Reviewed: 11/15/2010  [employer]® Patient Information ©2013 [employer].

## 2017-07-20 NOTE — PROGRESS NOTES
"Subjective:      Kayla Jensen is a 66 y.o. female who presents with GI Problem            GI Problem  This is a new problem. The current episode started in the past 7 days. The problem occurs constantly. The problem has been gradually worsening. Associated symptoms include abdominal pain and nausea. Pertinent negatives include no chills, fever or vomiting.       Review of Systems   Constitutional: Negative for fever and chills.   Gastrointestinal: Positive for nausea and abdominal pain. Negative for vomiting.        1     Allergies   Allergen Reactions   • Aspirin      Stomach pain   • Ibuprofen      Stomach pain   • Tape Rash         Objective:     /78 mmHg  Pulse 71  Temp(Src) 36.8 °C (98.3 °F)  Ht 1.6 m (5' 3\")  Wt 74.39 kg (164 lb)  BMI 29.06 kg/m2  SpO2 95%     Physical Exam   Constitutional: She is oriented to person, place, and time. She appears well-developed and well-nourished. No distress.   HENT:   Head: Normocephalic and atraumatic.   Eyes: Conjunctivae and EOM are normal. Pupils are equal, round, and reactive to light.   Cardiovascular: Normal rate and regular rhythm.    No murmur heard.  Pulmonary/Chest: Effort normal and breath sounds normal. No respiratory distress.   Abdominal: Soft. She exhibits no distension. There is no tenderness.   Neurological: She is alert and oriented to person, place, and time. She has normal reflexes. No sensory deficit.   Skin: Skin is warm and dry.   Psychiatric: She has a normal mood and affect.               Assessment/Plan:     1. Epigastric pain  NO acute findings on KUB. Differential diagnosis, natural history, supportive care, and indications for immediate follow-up discussed. Trial medications FU PRN.  - [id number] VIEW; Future  - dicyclomine (BENTYL) 10 MG Cap; Take 1 Cap by mouth 4 Times a Day,Before Meals and at Bedtime.  Dispense: 30 Cap; Refill: 0  - Simethicone 180 MG Cap; Take 1 Cap by mouth 4 times a day as needed.  Dispense: 60 " Cap; Refill: 0  - docusate sodium (COLACE) 100 MG Cap; Take 1 Cap by mouth 2 times a day.  Dispense: 30 Cap; Refill: 0

## 2017-07-24 ENCOUNTER — PATIENT OUTREACH (OUTPATIENT)
Dept: HEALTH INFORMATION MANAGEMENT | Facility: OTHER | Age: 67
End: 2017-07-24

## 2017-07-31 ENCOUNTER — OFFICE VISIT (OUTPATIENT)
Dept: URGENT CARE | Facility: PHYSICIAN GROUP | Age: 67
End: 2017-07-31
Payer: MEDICARE

## 2017-07-31 VITALS
WEIGHT: 164 LBS | HEIGHT: 63 IN | SYSTOLIC BLOOD PRESSURE: 126 MMHG | BODY MASS INDEX: 29.06 KG/M2 | OXYGEN SATURATION: 96 % | TEMPERATURE: 98.2 F | RESPIRATION RATE: 16 BRPM | HEART RATE: 84 BPM | DIASTOLIC BLOOD PRESSURE: 60 MMHG

## 2017-07-31 DIAGNOSIS — J01.90 ACUTE RHINOSINUSITIS: ICD-10-CM

## 2017-07-31 DIAGNOSIS — J98.8 RTI (RESPIRATORY TRACT INFECTION): ICD-10-CM

## 2017-07-31 PROCEDURE — 99214 OFFICE O/P EST MOD 30 MIN: CPT | Performed by: FAMILY MEDICINE

## 2017-07-31 RX ORDER — AMOXICILLIN AND CLAVULANATE POTASSIUM 875; 125 MG/1; MG/1
1 TABLET, FILM COATED ORAL 2 TIMES DAILY
Qty: 14 TAB | Refills: 0 | Status: SHIPPED | OUTPATIENT
Start: 2017-07-31 | End: 2017-08-07

## 2017-07-31 RX ORDER — PREDNISONE 10 MG/1
30 TABLET ORAL EVERY MORNING
Qty: 21 TAB | Refills: 0 | Status: SHIPPED | OUTPATIENT
Start: 2017-07-31 | End: 2017-08-07

## 2017-07-31 ASSESSMENT — ENCOUNTER SYMPTOMS
CHILLS: 0
DIZZINESS: 0
SPUTUM PRODUCTION: 0
FOCAL WEAKNESS: 0
SORE THROAT: 1
COUGH: 1
FEVER: 0

## 2017-07-31 NOTE — PROGRESS NOTES
Subjective:      Kayla Jensen is a 66 y.o. female who presents with Sinus Problem    Chief Complaint   Patient presents with   • Sinus Problem     Sinus Infection moving towards chest, 1x Week        - This is a very pleasant 66 y.o. female with complaints of 1wk cough sinus pain pressure DC. No NVFC          ALLERGIES:  Aspirin; Ibuprofen; and Tape     PMH:  Past Medical History   Diagnosis Date   • Heart burn    • Unspecified hemorrhagic conditions      nosebleeds related to anxiety   • Personal history of venous thrombosis and embolism      leg   • Allergy    • GERD (gastroesophageal reflux disease)         MEDS:    Current outpatient prescriptions:   •  predniSONE (DELTASONE) 10 MG Tab, Take 3 Tabs by mouth every morning for 7 days., Disp: 21 Tab, Rfl: 0  •  amoxicillin-clavulanate (AUGMENTIN) 875-125 MG Tab, Take 1 Tab by mouth 2 times a day for 7 days., Disp: 14 Tab, Rfl: 0  •  Simethicone 180 MG Cap, Take 1 Cap by mouth 4 times a day as needed., Disp: 60 Cap, Rfl: 0  •  tramadol (ULTRAM) 50 MG Tab, TAKE 1 TABLET BY MOUTH EVERY 8 HOURS AS NEEDED, Disp: 90 Tab, Rfl: 0  •  gabapentin (NEURONTIN) 300 MG Cap, Take 1 Cap by mouth 2 Times a Day., Disp: 180 Cap, Rfl: 1  •  dicyclomine (BENTYL) 10 MG Cap, Take 1 Cap by mouth 4 Times a Day,Before Meals and at Bedtime., Disp: 30 Cap, Rfl: 0  •  docusate sodium (COLACE) 100 MG Cap, Take 1 Cap by mouth 2 times a day., Disp: 30 Cap, Rfl: 0  •  acetaminophen-codeine #3 (TYLENOL #3) 300-30 MG Tab, Take 1-2 Tabs by mouth at bedtime as needed for Moderate Pain., Disp: 90 Tab, Rfl: 0  •  triamcinolone acetonide-orabase (KENALOG IN ORABASE) 0.1 % Paste, Apply twice daily to ulcer on tongue, Disp: 1 Tube, Rfl: 0  •  lidocaine (LIDODERM) 5 % Patch, Apply 1 Patch to skin as directed every 24 hours., Disp: 30 Patch, Rfl: 3  •  fexofenadine-pseudoephedrine (ALLEGRA-D)  MG per tablet, TAKE 1 TABLET BY MOUTH 2 TIMES A DAY., Disp: 180 Tab, Rfl: 3  •  triamcinolone  "(NASACORT) 55 MCG/ACT nasal inhaler, Spray 2 Sprays in nose every day., Disp: 1 Inhaler, Rfl: 0  •  albuterol 108 (90 BASE) MCG/ACT Aero Soln inhalation aerosol, Inhale 2 Puffs by mouth every 6 hours as needed for Shortness of Breath., Disp: 8.5 g, Rfl: 3    ** I have documented what I find to be significant in regards to past medical, social, family and surgical history  in my HPI or under PMH/PSH/FH review section, otherwise it is contributory **             Sinus Problem  Associated symptoms include congestion, coughing and a sore throat. Pertinent negatives include no chills.       Review of Systems   Constitutional: Negative for fever and chills.   HENT: Positive for congestion and sore throat.    Respiratory: Positive for cough. Negative for sputum production.    Neurological: Negative for dizziness and focal weakness.          Objective:     /60 mmHg  Pulse 84  Temp(Src) 36.8 °C (98.2 °F)  Resp 16  Ht 1.6 m (5' 2.99\")  Wt 74.39 kg (164 lb)  BMI 29.06 kg/m2  SpO2 96%     Physical Exam   Constitutional: She appears well-developed. No distress.   HENT:   Head: Normocephalic and atraumatic.   Mouth/Throat: Oropharynx is clear and moist.   Eyes: Conjunctivae are normal.   Neck: Neck supple.   Cardiovascular: Regular rhythm.    No murmur heard.  Pulmonary/Chest: Effort normal and breath sounds normal. No respiratory distress.   Neurological: She is alert. She exhibits normal muscle tone.   Skin: Skin is warm and dry.   Psychiatric: She has a normal mood and affect. Judgment normal.   Nursing note and vitals reviewed.              Assessment/Plan:         1. RTI (respiratory tract infection)     2. Acute rhinosinusitis  predniSONE (DELTASONE) 10 MG Tab    amoxicillin-clavulanate (AUGMENTIN) 875-125 MG Tab             Dx & d/c instructions discussed w/ patient and/or family members. Follow up w/ Prvt Dr or here in 3-4 days if not getting better, sooner if needed,  ER if worse and UC/PCP unavailable.  "       Possible side effects (i.e. Rash, GI upset/constipation, sedation, elevation of BP or sugars) of any medications given discussed.

## 2017-07-31 NOTE — MR AVS SNAPSHOT
"        Kayla Jensen   2017 8:00 AM   Office Visit   MRN: [id number]    Department:  [facility]   Dept Phone:  [phone]    Description:  Female : 1950   Provider:  Gokul Liu M.D.           Reason for Visit     Sinus Problem Sinus Infection moving towards chest, 1x Week      Allergies as of 2017     Allergen Noted Reactions    Aspirin 2009       Stomach pain    Ibuprofen 2009       Stomach pain    Tape 2009   Rash      You were diagnosed with     RTI (respiratory tract infection)   [726794]       Acute rhinosinusitis   [268581]         Vital Signs     Blood Pressure Pulse Temperature Respirations Height Weight    126/60 mmHg 84 36.8 °C (98.2 °F) 16 1.6 m (5' 2.99\") 74.39 kg (164 lb)    Body Mass Index Oxygen Saturation Smoking Status             29.06 kg/m2 96% Former Smoker         Basic Information     Date Of Birth Sex Race Ethnicity Preferred Language    1950 Female White Non- English      Your appointments     2018  7:40 AM   Established Patient with KATY Gay   [facility] ([place])    [address] [place] [address] [id number] [phone]           You will be receiving a confirmation call a few days before your appointment from our automated call confirmation system.              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   3/18/2016 - Present    Personal history of smoking Z87.891   3/18/2016 - Present    Chronic use of opiate drugs therapeutic purposes Z79.891   2017 - Present    Vitamin D insufficiency E55.9   2017 - Present    Sinus complaint R09.89   2017 - Present    Insomnia secondary to anxiety F41.9, F51.05   2017 - Present    Pure hypercholesterolemia E78.00   2017 - Present      Health Maintenance        Date " Due Completion Dates    BONE DENSITY 11/23/2014 ---    MAMMOGRAM 4/8/2017 4/8/2016    IMM INFLUENZA (1) 9/1/2017 9/6/2016, 10/2/2014    IMM PNEUMOCOCCAL 65+ (ADULT) LOW/MEDIUM RISK SERIES (2 of 2 - PPSV23) 11/29/2017 11/29/2016    COLONOSCOPY 11/29/2019 11/29/2009 (Done)    Override on 11/29/2009: Done ([facility])    IMM DTaP/Tdap/Td Vaccine (2 - Td) 11/29/2026 11/29/2016            Current Immunizations     13-VALENT PCV PREVNAR 11/29/2016    Influenza Vaccine Adult HD 9/6/2016 10:36 AM    Influenza Vaccine Quad Inj (Pf) 10/2/2014    SHINGLES VACCINE 11/29/2016    Tdap Vaccine 11/29/2016      Below and/or attached are the medications your provider expects you to take. Review all of your home medications and newly ordered medications with your provider and/or pharmacist. Follow medication instructions as directed by your provider and/or pharmacist. Please keep your medication list with you and share with your provider. Update the information when medications are discontinued, doses are changed, or new medications (including over-the-counter products) are added; and carry medication information at all times in the event of emergency situations     Allergies:  ASPIRIN - (reactions not documented)     IBUPROFEN - (reactions not documented)     TAPE - Rash               Medications  Valid as of: July 31, 2017 -  8:37 AM    Generic Name Brand Name Tablet Size Instructions for use    Acetaminophen-Codeine (Tab) TYLENOL #3 300-30 MG Take 1-2 Tabs by mouth at bedtime as needed for Moderate Pain.        Albuterol Sulfate (Aero Soln) albuterol 108 (90 BASE) MCG/ACT Inhale 2 Puffs by mouth every 6 hours as needed for Shortness of Breath.        Amoxicillin-Pot Clavulanate (Tab) AUGMENTIN 875-125 MG Take 1 Tab by mouth 2 times a day for 7 days.        Dicyclomine HCl (Cap) BENTYL 10 MG Take 1 Cap by mouth 4 Times a Day,Before Meals and at Bedtime.        Docusate Sodium (Cap) COLACE 100 MG Take 1 Cap by mouth  2 times a day.        Fexofenadine-Pseudoephedrine (TABLET SR 12 HR) ALLEGRA-D  MG TAKE 1 TABLET BY MOUTH 2 TIMES A DAY.        Gabapentin (Cap) NEURONTIN 300 MG Take 1 Cap by mouth 2 Times a Day.        Lidocaine (Patch) LIDODERM 5 % Apply 1 Patch to skin as directed every 24 hours.        PredniSONE (Tab) DELTASONE 10 MG Take 3 Tabs by mouth every morning for 7 days.        Simethicone (Cap) Simethicone 180 MG Take 1 Cap by mouth 4 times a day as needed.        TraMADol HCl (Tab) ULTRAM 50 MG TAKE 1 TABLET BY MOUTH EVERY 8 HOURS AS NEEDED        Triamcinolone Acetonide (Aerosol) NASACORT 55 MCG/ACT [place] 2 Sprays in nose every day.        Triamcinolone Acetonide (Paste) KENALOG IN ORABASE 0.1 % Apply twice daily to ulcer on tongue        .                 Medicines prescribed today were sent to:     [id number] [place] [facility] [id number] [facility] [address]    Phone: [phone] Fax: [phone]    Open 24 Hours?: No    [facility]Y #[id number] - [place], [place] - [address]    [address] [facility] [address]    Phone: [phone] Fax: [phone]    Open 24 Hours?: No      Medication refill instructions:       If your prescription bottle indicates you have medication refills left, it is not necessary to call your provider’s office. Please contact your pharmacy and they will refill your medication.    If your prescription bottle indicates you do not have any refills left, you may request refills at any time through one of the following ways: The online [employer] Messaging system (except Urgent Care), by calling your provider’s office, or by asking your pharmacy to contact your provider’s office with a refill request. Medication refills are processed only during regular business hours and may not be available until the next business day. Your provider may request additional information or to have a follow-up visit with you prior to refilling your medication.      *Please Note: Medication refills are assigned a new Rx number when refilled electronically. Your pharmacy may indicate that no refills were authorized even though a new prescription for the same medication is available at the pharmacy. Please request the medicine by name with the pharmacy before contacting your provider for a refill.           [employer] ([employer]hart Access Code: Activation code not generated  Current [email or web address] Status: Active

## 2017-09-14 DIAGNOSIS — M54.9 CHRONIC BACK PAIN: ICD-10-CM

## 2017-09-14 DIAGNOSIS — G89.29 CHRONIC BACK PAIN: ICD-10-CM

## 2017-09-14 RX ORDER — GABAPENTIN 300 MG/1
CAPSULE ORAL
Qty: 540 CAP | Refills: 3 | Status: SHIPPED | OUTPATIENT
Start: 2017-09-14 | End: 2018-09-07 | Stop reason: SDUPTHER

## 2017-09-22 RX ORDER — TRAMADOL HYDROCHLORIDE 50 MG/1
TABLET ORAL
Qty: 90 TAB | Refills: 0 | Status: SHIPPED
Start: 2017-09-22 | End: 2017-12-26 | Stop reason: SDUPTHER

## 2017-09-25 ENCOUNTER — OFFICE VISIT (OUTPATIENT)
Dept: URGENT CARE | Facility: PHYSICIAN GROUP | Age: 67
End: 2017-09-25
Payer: MEDICARE

## 2017-09-25 VITALS
RESPIRATION RATE: 14 BRPM | SYSTOLIC BLOOD PRESSURE: 140 MMHG | OXYGEN SATURATION: 97 % | TEMPERATURE: 96.7 F | HEIGHT: 63 IN | WEIGHT: 165 LBS | BODY MASS INDEX: 29.23 KG/M2 | HEART RATE: 85 BPM | DIASTOLIC BLOOD PRESSURE: 82 MMHG

## 2017-09-25 DIAGNOSIS — J01.00 ACUTE MAXILLARY SINUSITIS, RECURRENCE NOT SPECIFIED: ICD-10-CM

## 2017-09-25 LAB
INT CON NEG: NEGATIVE
INT CON POS: POSITIVE
S PYO AG THROAT QL: NEGATIVE

## 2017-09-25 PROCEDURE — 99214 OFFICE O/P EST MOD 30 MIN: CPT | Performed by: FAMILY MEDICINE

## 2017-09-25 PROCEDURE — 87880 STREP A ASSAY W/OPTIC: CPT | Performed by: FAMILY MEDICINE

## 2017-09-25 RX ORDER — DOXYCYCLINE HYCLATE 100 MG
100 TABLET ORAL 2 TIMES DAILY
Qty: 20 TAB | Refills: 0 | Status: SHIPPED | OUTPATIENT
Start: 2017-09-25 | End: 2017-10-05

## 2017-09-25 ASSESSMENT — PAIN SCALES - GENERAL: PAINLEVEL: NO PAIN

## 2017-09-25 NOTE — PROGRESS NOTES
Chief Complaint   Patient presents with   • Sinus Problem     chest congestion, x 3 days         Sinusitis  This is a new problem. The current episode started in the past 4 days. The problem has been gradually worsening since onset. There has been no fever. Associated symptoms include congestion, coughing, headaches, sore throat and sinus pressure.   She has a hx of recurrent sinus infections and had sinus surgery many years ago, which helped for a year or two, but she states that she has been having sinus infections a couple of times per year for the past couple of years.     Past Surgical History:   Procedure Laterality Date   • KNEE ARTHROPLASTY TOTAL  3/18/2009    Performed by SURI PARK at SURGERY [facility]   • CYST EXCISION      right breast   • HYSTERECTOMY, VAGINAL     • MI THROAT SURGERY PROCEDURE UNLISTED     • TONSILLECTOMY             Pertinent negatives include no sob, chest pain or sore throat. Past treatments include nothing.     Social History   Substance Use Topics   • Smoking status: Former Smoker     Packs/day: 1.50     Years: 40.00     Types: Cigarettes     Quit date: 3/1/2002   • Smokeless tobacco: Never Used   • Alcohol use 0.0 oz/week      Comment: 2 a week         Current Outpatient Prescriptions on File Prior to Visit   Medication Sig Dispense Refill   • tramadol (ULTRAM) 50 MG Tab TAKE 1 TABLET BY MOUTH EVERY 8 HOURS AS NEEDED 90 Tab 0   • gabapentin (NEURONTIN) 300 MG Cap TAKE 3 CAPSULES BY MOUTH TWICE A  Cap 3   • dicyclomine (BENTYL) 10 MG Cap Take 1 Cap by mouth 4 Times a Day,Before Meals and at Bedtime. 30 Cap 0   • Simethicone 180 MG Cap Take 1 Cap by mouth 4 times a day as needed. 60 Cap 0   • docusate sodium (COLACE) 100 MG Cap Take 1 Cap by mouth 2 times a day. 30 Cap 0   • acetaminophen-codeine #3 (TYLENOL #3) 300-30 MG Tab Take 1-2 Tabs by mouth at bedtime as needed for Moderate Pain. 90 Tab 0   • triamcinolone acetonide-orabase (KENALOG IN ORABASE) 0.1 %  "Paste Apply twice daily to ulcer on tongue 1 Tube 0   • lidocaine (LIDODERM) 5 % Patch Apply 1 Patch to skin as directed every 24 hours. 30 Patch 3   • fexofenadine-pseudoephedrine (ALLEGRA-D)  MG per tablet TAKE 1 TABLET BY MOUTH 2 TIMES A DAY. 180 Tab 3   • triamcinolone (NASACORT) 55 MCG/ACT nasal inhaler Spray 2 Sprays in nose every day. 1 Inhaler 0   • albuterol 108 (90 BASE) MCG/ACT Aero Soln inhalation aerosol Inhale 2 Puffs by mouth every 6 hours as needed for Shortness of Breath. 8.5 g 3     No current facility-administered medications on file prior to visit.            Allergies   Allergen Reactions   • Aspirin      Stomach pain   • Ibuprofen      Stomach pain   • Tape Rash         Family history was reviewed and not pertinent     Review of Systems   Constitutional: Negative for fever.   HENT: Positive for congestion and sinus pressure. Negative for sneezing and sore throat.    Respiratory: Positive for cough.    Neurological: Positive for headaches.   All other systems reviewed and are negative.         Objective:     Blood pressure 140/82, pulse 85, temperature 35.9 °C (96.7 °F), resp. rate 14, height 1.6 m (5' 3\"), weight 74.8 kg (165 lb), last menstrual period 09/25/2007, SpO2 97 %, not currently breastfeeding.      Physical Exam   Constitutional: patient is oriented to person, place, and time. Patient appears well-developed and well-nourished.   HENT:   Head: Normocephalic and atraumatic.   Right Ear: Hearing, tympanic membrane and external ear normal.   Left Ear: Hearing, tympanic membrane and external ear normal.   Nose: Mucosal edema and nasal discharge  present.  . No epistaxis.  Left and right sinus tenderness noted  Mouth/Throat: Uvula is midline and mucous membranes are normal. Oropharyngeal exudate present. No posterior oropharyngeal edema or posterior oropharyngeal erythema. tonsils were absent    Eyes: Conjunctivae and EOM are normal. Pupils are equal, round, and reactive to light. " Left and right eyes - no discharge. No scleral icterus.   Neck: Normal range of motion. Neck supple. No JVD present. No tracheal deviation present. No thyromegaly present.   Cardiovascular: Normal rate, regular rhythm, normal heart sounds and intact distal pulses.    No murmur heard.  Pulmonary/Chest: Breath sounds normal. No respiratory distress.   no wheezes, rales.      Musculoskeletal: Normal range of motion.   no edema.   Lymphadenopathy:     There is positive  cervical adenopathy.   Neurological:   alert and oriented to person, place, and time.   Skin: Skin is warm and dry. No erythema.   Psychiatric:   normal mood and affect.  behavior is normal.   Nursing note and vitals reviewed.          Assessment/Plan:     1. Acute maxillary sinusitis, recurrence not specified  Rapid strep neg  - doxycycline (VIBRAMYCIN) 100 MG Tab; Take 1 Tab by mouth 2 times a day for 10 days.  Dispense: 20 Tab; Refill: 0  - REFERRAL TO ENT

## 2017-11-08 ENCOUNTER — TELEPHONE (OUTPATIENT)
Dept: HEALTH INFORMATION MANAGEMENT | Facility: OTHER | Age: 67
End: 2017-11-08

## 2017-11-08 ENCOUNTER — PATIENT OUTREACH (OUTPATIENT)
Dept: HEALTH INFORMATION MANAGEMENT | Facility: OTHER | Age: 67
End: 2017-11-08

## 2017-11-08 DIAGNOSIS — Z78.0 POSTMENOPAUSAL: ICD-10-CM

## 2017-11-09 NOTE — PROGRESS NOTES
Attempt #:1    WebIZ Checked & Epic Updated: Epic matches WebIZ  · WebIZ Recommendations: Patient is up to date on all vaccines  · Is patient due for Tdap? NO  · Is patient due for Shingles? NO  [facility] Verified: yes  Verify PCP: yes    Communication Preference Obtained: yes     Review Care Team: yes    Annual Wellness Visit Scheduling  1. Scheduling Status:Scheduled    Care Gap Scheduling (Attempt to Schedule EACH Overdue Care Gap!)     Health Maintenance Due   Topic Date Due   • BONE DENSITY  11/23/2014   • MAMMOGRAM  04/08/2017   • LUNG CANCER SCREENING  07/20/2017        Scheduled patient for Annual Wellness Visit, Dexa Scan and Mammogram       [employer] Activation: already active  [employer] Jeanna: no  Virtual Visits: no  Opt In to Text Messages: no

## 2017-11-13 RX ORDER — INFLUENZA A VIRUS A/MICHIGAN/45/2015 X-275 (H1N1) ANTIGEN (FORMALDEHYDE INACTIVATED), INFLUENZA A VIRUS A/SINGAPORE/INFIMH-16-0019/2016 IVR-186 (H3N2) ANTIGEN (FORMALDEHYDE INACTIVATED), AND INFLUENZA B VIRUS B/MARYLAND/15/2016 BX-69A (A B/COLORADO/6/2017-LIKE VIRUS) ANTIGEN (FORMALDEHYDE INACTIVATED) 60; 60; 60 UG/.5ML; UG/.5ML; UG/.5ML
INJECTION, SUSPENSION INTRAMUSCULAR
Refills: 0 | COMMUNITY
Start: 2017-09-14 | End: 2017-11-15

## 2017-11-15 ENCOUNTER — OFFICE VISIT (OUTPATIENT)
Dept: MEDICAL GROUP | Facility: PHYSICIAN GROUP | Age: 67
End: 2017-11-15
Payer: MEDICARE

## 2017-11-15 VITALS
SYSTOLIC BLOOD PRESSURE: 128 MMHG | BODY MASS INDEX: 28.7 KG/M2 | HEIGHT: 63 IN | TEMPERATURE: 97.6 F | WEIGHT: 162 LBS | HEART RATE: 74 BPM | DIASTOLIC BLOOD PRESSURE: 72 MMHG | OXYGEN SATURATION: 94 %

## 2017-11-15 DIAGNOSIS — F51.04 PSYCHOPHYSIOLOGICAL INSOMNIA: ICD-10-CM

## 2017-11-15 DIAGNOSIS — Z87.891 PERSONAL HISTORY OF SMOKING: ICD-10-CM

## 2017-11-15 DIAGNOSIS — G89.29 CHRONIC LEFT-SIDED LOW BACK PAIN WITH LEFT-SIDED SCIATICA: ICD-10-CM

## 2017-11-15 DIAGNOSIS — M54.16 CHRONIC RADICULAR LUMBAR PAIN: ICD-10-CM

## 2017-11-15 DIAGNOSIS — M54.42 CHRONIC LEFT-SIDED LOW BACK PAIN WITH LEFT-SIDED SCIATICA: ICD-10-CM

## 2017-11-15 DIAGNOSIS — E55.9 VITAMIN D INSUFFICIENCY: ICD-10-CM

## 2017-11-15 DIAGNOSIS — E78.00 PURE HYPERCHOLESTEROLEMIA: ICD-10-CM

## 2017-11-15 DIAGNOSIS — Z79.891 CHRONIC USE OF OPIATE DRUGS THERAPEUTIC PURPOSES: ICD-10-CM

## 2017-11-15 DIAGNOSIS — R22.41 MASS OF RIGHT KNEE: ICD-10-CM

## 2017-11-15 DIAGNOSIS — Z00.00 MEDICARE ANNUAL WELLNESS VISIT, SUBSEQUENT: ICD-10-CM

## 2017-11-15 DIAGNOSIS — R09.89 CHRONIC SINUS COMPLAINTS: ICD-10-CM

## 2017-11-15 DIAGNOSIS — G89.29 CHRONIC RADICULAR LUMBAR PAIN: ICD-10-CM

## 2017-11-15 PROBLEM — M25.561 RIGHT KNEE PAIN: Status: ACTIVE | Noted: 2017-11-15

## 2017-11-15 PROCEDURE — 99214 OFFICE O/P EST MOD 30 MIN: CPT | Mod: 25 | Performed by: NURSE PRACTITIONER

## 2017-11-15 PROCEDURE — G0439 PPPS, SUBSEQ VISIT: HCPCS | Mod: 25 | Performed by: NURSE PRACTITIONER

## 2017-11-15 ASSESSMENT — PATIENT HEALTH QUESTIONNAIRE - PHQ9: CLINICAL INTERPRETATION OF PHQ2 SCORE: 0

## 2017-11-15 ASSESSMENT — PAIN SCALES - GENERAL: PAINLEVEL: 4=SLIGHT-MODERATE PAIN

## 2017-11-15 NOTE — ASSESSMENT & PLAN NOTE
New problem starting over this summer. Worsening. There is a firm nodule medial knee that is growing and painful.

## 2017-11-15 NOTE — PROGRESS NOTES
Chief Complaint   Patient presents with   • Annual Wellness Visit         HPI:  Kayla Jensen is a 66 y.o. here for Medicare Annual Wellness Visit     Psychophysiological insomnia  Ongoing chronic problem not well controlled, states more due to pain than anything else. She is not interested in any treatment for this and OTC treatments failed. Followed by PCP.     Chronic sinus complaints  Ongoing chronic problem, not controlled with recurrent sinus infections. Was referred to ENT but she called twice and never got appointment. Currently on treatment with Allegra-D. Followed by PCP only at this time    Chronic back pain  Chronic problem, currently treated with narcotics for pain relief followed by PCP. Also followed by Dr. Araiza for procedural management of pain, received epidural this year and requesting referral to go back for consideration of another epidural.     Chronic radicular lumbar pain  Ongoing chronic problem remaining stable with pain mostly down left leg into foot, treated with gabapentin followed by PCP, and procedural management with Dr. Araiza.     Mass of right knee  New problem starting over this summer. Worsening. There is a firm nodule medial knee that is growing and painful.     Vitamin D insufficiency  Ongoing problem currently managed with OTC vitamin D supplementation. She will be due for labs next year for monitoring. Followed by PCP.    Pure hypercholesterolemia  Has history of high cholesterol, lipid panel up-to-date and stable with no need for statin therapy based on 10 year ASCVD risk calculation. Followed by PCP    Personal history of smoking  Patient has history of smoking 60 pack years. Quit 2002. She is without any symptoms of lung cancer. No history of lung cancer. She no longer qualifies for CT annual cancer screening as it has been greater than 15 years since she quit smoking. She has been told that she can take the order to [place] and afford the imaging  there.    Chronic use of opiate drugs therapeutic purposes  Chronic problem followed by PCP management of tramadol and Tylenol No. 3. UDS up-to-date. Stable on meds.      Patient Active Problem List    Diagnosis Date Noted   • Chronic radicular lumbar pain 11/15/2017   • Mass of right knee 11/15/2017   • Pure hypercholesterolemia 07/12/2017   • Vitamin D insufficiency 07/11/2017   • Psychophysiological insomnia 07/11/2017   • Chronic use of opiate drugs therapeutic purposes 02/01/2017   • Chronic back pain 03/18/2016   • Chronic sinus complaints 03/18/2016   • Personal history of smoking 03/18/2016       Current Outpatient Prescriptions   Medication Sig Dispense Refill   • VITAMIN D, CHOLECALCIFEROL, PO Take  by mouth.     • acetaminophen-codeine #3 (TYLENOL #3) 300-30 MG Tab Take 1-2 Tabs by mouth at bedtime as needed for Moderate Pain. 90 Tab 0   • tramadol (ULTRAM) 50 MG Tab TAKE 1 TABLET BY MOUTH EVERY 8 HOURS AS NEEDED 90 Tab 0   • gabapentin (NEURONTIN) 300 MG Cap TAKE 3 CAPSULES BY MOUTH TWICE A  Cap 3   • fexofenadine-pseudoephedrine (ALLEGRA-D)  MG per tablet TAKE 1 TABLET BY MOUTH 2 TIMES A DAY. 180 Tab 3   • lidocaine (LIDODERM) 5 % Patch Apply 1 Patch to skin as directed every 24 hours. 30 Patch 3   • albuterol 108 (90 BASE) MCG/ACT Aero Soln inhalation aerosol Inhale 2 Puffs by mouth every 6 hours as needed for Shortness of Breath. 8.5 g 3     No current facility-administered medications for this visit.             Current supplements as per medication list.       Allergies: Aspirin; Ibuprofen; and Tape    Current social contact/activities: WORK, GYM     She  reports that she quit smoking about 15 years ago. Her smoking use included Cigarettes. She has a 60.00 pack-year smoking history. She has never used smokeless tobacco. She reports that she drinks alcohol. She reports that she does not use drugs.  Counseling given: Not Answered        DPA/Advanced Directive:  Patient does not have an  Advanced Directive.  A packet and workshop information was given on Advanced Directives.      ROS:    Gait: Uses no assistive device   Ostomy: yes   Other tubes: no   Amputations: no   Chronic oxygen use: no   Last eye exam:3 YRS SCHEDULED 11/16/17   : Denies incontinence.       Screening:    Depression Screening    Little interest or pleasure in doing things?  0 - not at all  Feeling down, depressed , or hopeless? 0 - not at all  Patient Health Questionnaire Score: 0     If depressive symptoms identified deferred to follow up visit unless specifically addressed in assessment and plan.    Interpretation of PHQ-9 Total Score   Score Severity   1-4 No Depression   5-9 Mild Depression   10-14 Moderate Depression   15-19 Moderately Severe Depression   20-27 Severe Depression    Screening for Cognitive Impairment    Three Minute Recall (apple, watch, anival)  3/3    Draw clock face with all 12 numbers set to the hand to show 10 minutes past 11 o'clock  1    Cognitive concerns identified deferred for follow up unless specifically addressed in assessment and plan.    Fall Risk Assessment    Has the patient had two or more falls in the last year or any fall with injury in the last year?  No    Safety Assessment    Throw rugs on floor.  No  Handrails on all stairs.  No  Good lighting in all hallways.  Yes  Difficulty hearing.  No  Patient counseled about all safety risks that were identified.    Functional Assessment ADLs    Are there any barriers preventing you from cooking for yourself or meeting nutritional needs?  No.    Are there any barriers preventing you from driving safely or obtaining transportation?  No.    Are there any barriers preventing you from using a telephone or calling for help?  No.    Are there any barriers preventing you from shopping?  No.    Are there any barriers preventing you from taking care of your own finances?  No.    Are there any barriers preventing you from managing your medications?  No.     Are currently engaging any exercise or physical activity?  Yes.  WALK WORK, HOME GYM    Health Maintenance Summary                BONE DENSITY Overdue 11/23/2014     MAMMOGRAM Overdue 4/8/2017      Done 4/8/2016 MA-SCREEN MAMMO W/CAD-BILAT    LUNG CANCER SCREENING Overdue 7/20/2017      Done 7/20/2016 CT-LUNG CANCER-SCREENING    IMM PNEUMOCOCCAL 65+ (ADULT) LOW/MEDIUM RISK SERIES Next Due 11/29/2017      Done 11/29/2016 Imm Admin: Pneumococcal Conjugate Vaccine (Prevnar/PCV-13)    Annual Wellness Visit Next Due 11/30/2017      Done 11/29/2016      Patient has more history with this topic...    URINE DRUG SCREEN Next Due 7/6/2018      Done 7/11/2017     COLONOSCOPY Next Due 11/29/2019      Done 11/29/2009 [facility]    IMM DTaP/Tdap/Td Vaccine Next Due 11/29/2026      Done 11/29/2016 Imm Admin: Tdap Vaccine          Patient Care Team:  KATY Mcfarlane as PCP - General (Family Medicine)  Patricia Soria M.D. as Consulting Physician (Neurosurgery)  Dylan Pettit O.D. as Consulting Physician (Optometry)      Social History   Substance Use Topics   • Smoking status: Former Smoker     Packs/day: 1.50     Years: 40.00     Types: Cigarettes     Quit date: 3/1/2002   • Smokeless tobacco: Never Used   • Alcohol use 0.0 oz/week      Comment: 2 a week     Family History   Problem Relation Age of Onset   • Heart Disease Mother    • Dementia Mother    • Cancer Brother      lead     She  has a past medical history of Allergy; GERD (gastroesophageal reflux disease); Heart burn; Personal history of venous thrombosis and embolism; and Unspecified hemorrhagic conditions. She also has no past medical history of Angina; Backpain; Bronchitis; Dialysis; Heart valve disease; Indigestion; Infectious disease; Jaundice; Other specified symptom associated with female genital organs; Pacemaker; Pneumonia; Psychiatric problem; Rheumatic fever; or Unspecified urinary incontinence.   Past Surgical History:   Procedure  "Laterality Date   • KNEE ARTHROPLASTY TOTAL  3/18/2009    Performed by SURI PARK at SURGERY [facility] ORS   • CYST EXCISION      right breast   • HYSTERECTOMY, VAGINAL     • MT THROAT SURGERY PROCEDURE UNLISTED     • TONSILLECTOMY         Exam:     Blood pressure 128/72, pulse 74, temperature 36.4 °C (97.6 °F), height 1.6 m (5' 3\"), weight 73.5 kg (162 lb), SpO2 94 %, not currently breastfeeding. Body mass index is 28.7 kg/m².    Hearing excellent.    Dentition good  Alert, oriented in no acute distress.  Eye contact is good, speech goal directed, affect calm  Right knee Inspection: no edema. Large, firm nodule medial knee. Full unrestricted symmetric ROM. No crepitus.   No palpable effusions. No joint line tenderness.   Alignment is normal.          Assessment and Plan. The following treatment and monitoring plan is recommended:    1. Medicare annual wellness visit, subsequent     2. Chronic left-sided low back pain with left-sided sciatica  Chronic problem, fairly controlled, refer back to Dr. Araiza for procedural management. monitor  acetaminophen-codeine #3 (TYLENOL #3) 300-30 MG Tab    REFERRAL TO PHYSICIAL MEDICINE REHAB   3. Chronic radicular lumbar pain  Chronic problem fairly controlled, continue gabapentin and f/u with myself, and refer back to Dr. Araiza for procedural management   REFERRAL TO PHYSICIAL MEDICINE REHAB   4. Chronic use of opiate drugs therapeutic purposes  Stable, f/u with PCP every 3 months    5. Psychophysiological insomnia  Not well controlled, f/u with PCP if desire for any further treatment    6. Chronic sinus complaints  Chronic problem, not controlled as there was no follow through with referral. Re-refer to ENT and obtain Ct sinuses. F/u with PCP as needed until seen by ENT  CT-LOCALIZATION LIMITED SINUSES    REFERRAL TO ENT   7. Personal history of smoking  CT-CHEST W/O LOW DOSE   8. Mass of right knee  New problem check x-ray. F/u with PCP for results and POC  DX-KNEE " COMPLETE 4+ RIGHT   9. Vitamin D insufficiency  Stable on supplementation, due for f/u labs for monitoring in July with PCP   10. Pure hypercholesterolemia  Stable without treatment, f/u labs due in July with PCP for monitoring          Services suggested: No services needed at this time  Health Care Screening: Age-appropriate preventive services Medicare covers discussed today and ordered if indicated.  Referrals offered: Community-based lifestyle interventions to reduce health risks and promote self-management and wellness, fall prevention, nutrition, physical activity, tobacco-use cessation, weight loss, and mental health services as per orders if indicated.    Discussion today about general wellness and lifestyle habits:    · Prevent falls and reduce trip hazards; Cautioned about securing or removing rugs.  · Have a working fire alarm and carbon monoxide detector;   · Engage in regular physical activity and social activities       Follow-up: Return keep January appointment .

## 2017-11-15 NOTE — ASSESSMENT & PLAN NOTE
Ongoing problem currently managed with OTC vitamin D supplementation. She will be due for labs next year for monitoring. Followed by PCP.

## 2017-11-15 NOTE — ASSESSMENT & PLAN NOTE
Has history of high cholesterol, lipid panel up-to-date and stable with no need for statin therapy based on 10 year ASCVD risk calculation. Followed by PCP

## 2017-11-15 NOTE — ASSESSMENT & PLAN NOTE
Ongoing chronic problem remaining stable with pain mostly down left leg into foot, treated with gabapentin followed by PCP, and procedural management with Dr. Araiza.

## 2017-11-15 NOTE — ASSESSMENT & PLAN NOTE
Chronic problem followed by PCP management of tramadol and Tylenol No. 3. UDS up-to-date. Stable on meds.

## 2017-11-15 NOTE — ASSESSMENT & PLAN NOTE
Patient has history of smoking 60 pack years. Quit 2002. She is without any symptoms of lung cancer. No history of lung cancer. She no longer qualifies for CT annual cancer screening as it has been greater than 15 years since she quit smoking. She has been told that she can take the order to [place] and afford the imaging there.

## 2017-11-15 NOTE — ASSESSMENT & PLAN NOTE
Ongoing chronic problem not well controlled, states more due to pain than anything else. She is not interested in any treatment for this and OTC treatments failed. Followed by PCP.

## 2017-11-15 NOTE — ASSESSMENT & PLAN NOTE
Ongoing chronic problem, not controlled with recurrent sinus infections. Was referred to ENT but she called twice and never got appointment. Currently on treatment with Allegra-D. Followed by PCP only at this time

## 2017-11-15 NOTE — ASSESSMENT & PLAN NOTE
Chronic problem, currently treated with narcotics for pain relief followed by PCP. Also followed by Dr. Araiza for procedural management of pain, received epidural this year and requesting referral to go back for consideration of another epidural.

## 2017-11-16 ENCOUNTER — HOSPITAL ENCOUNTER (OUTPATIENT)
Dept: RADIOLOGY | Facility: MEDICAL CENTER | Age: 67
End: 2017-11-16
Attending: NURSE PRACTITIONER
Payer: MEDICARE

## 2017-11-16 DIAGNOSIS — R22.41 MASS OF RIGHT KNEE: ICD-10-CM

## 2017-11-16 PROCEDURE — 73564 X-RAY EXAM KNEE 4 OR MORE: CPT | Mod: RT

## 2017-11-17 ENCOUNTER — PATIENT MESSAGE (OUTPATIENT)
Dept: MEDICAL GROUP | Facility: PHYSICIAN GROUP | Age: 67
End: 2017-11-17

## 2017-11-17 DIAGNOSIS — R09.89 CHRONIC SINUS COMPLAINTS: ICD-10-CM

## 2017-11-17 DIAGNOSIS — M17.11 PRIMARY OSTEOARTHRITIS OF RIGHT KNEE: ICD-10-CM

## 2017-11-17 DIAGNOSIS — M25.761 OSTEOPHYTE OF RIGHT KNEE: ICD-10-CM

## 2017-11-28 ENCOUNTER — HOSPITAL ENCOUNTER (OUTPATIENT)
Dept: RADIOLOGY | Facility: MEDICAL CENTER | Age: 67
End: 2017-11-28
Attending: NURSE PRACTITIONER
Payer: MEDICARE

## 2017-11-28 ENCOUNTER — PATIENT MESSAGE (OUTPATIENT)
Dept: MEDICAL GROUP | Facility: PHYSICIAN GROUP | Age: 67
End: 2017-11-28

## 2017-11-28 DIAGNOSIS — R09.89 CHRONIC SINUS COMPLAINTS: ICD-10-CM

## 2017-11-28 PROCEDURE — 70486 CT MAXILLOFACIAL W/O DYE: CPT

## 2017-11-29 RX ORDER — CEFDINIR 300 MG/1
300 CAPSULE ORAL 2 TIMES DAILY
Qty: 42 CAP | Refills: 0 | Status: SHIPPED | OUTPATIENT
Start: 2017-11-29 | End: 2018-01-12

## 2017-12-06 ENCOUNTER — HOSPITAL ENCOUNTER (OUTPATIENT)
Dept: RADIOLOGY | Facility: MEDICAL CENTER | Age: 67
End: 2017-12-06
Attending: NURSE PRACTITIONER
Payer: MEDICARE

## 2017-12-06 DIAGNOSIS — Z12.31 VISIT FOR SCREENING MAMMOGRAM: ICD-10-CM

## 2017-12-06 DIAGNOSIS — Z12.39 SCREENING FOR BREAST CANCER: ICD-10-CM

## 2017-12-06 DIAGNOSIS — Z78.0 POSTMENOPAUSAL: ICD-10-CM

## 2017-12-06 PROCEDURE — 77063 BREAST TOMOSYNTHESIS BI: CPT

## 2017-12-06 PROCEDURE — 77080 DXA BONE DENSITY AXIAL: CPT

## 2017-12-26 DIAGNOSIS — M54.50 CHRONIC BILATERAL LOW BACK PAIN WITHOUT SCIATICA: ICD-10-CM

## 2017-12-26 DIAGNOSIS — G89.29 CHRONIC BILATERAL LOW BACK PAIN WITHOUT SCIATICA: ICD-10-CM

## 2017-12-26 RX ORDER — TRAMADOL HYDROCHLORIDE 50 MG/1
TABLET ORAL
Qty: 90 TAB | Refills: 0 | Status: SHIPPED
Start: 2017-12-26 | End: 2018-03-26

## 2018-01-12 ENCOUNTER — OFFICE VISIT (OUTPATIENT)
Dept: MEDICAL GROUP | Facility: PHYSICIAN GROUP | Age: 68
End: 2018-01-12
Payer: MEDICARE

## 2018-01-12 VITALS
TEMPERATURE: 98.1 F | SYSTOLIC BLOOD PRESSURE: 130 MMHG | HEIGHT: 63 IN | DIASTOLIC BLOOD PRESSURE: 72 MMHG | WEIGHT: 165 LBS | OXYGEN SATURATION: 92 % | BODY MASS INDEX: 29.23 KG/M2 | HEART RATE: 86 BPM | RESPIRATION RATE: 12 BRPM

## 2018-01-12 DIAGNOSIS — M54.16 CHRONIC RADICULAR LUMBAR PAIN: ICD-10-CM

## 2018-01-12 DIAGNOSIS — G89.29 CHRONIC RADICULAR LUMBAR PAIN: ICD-10-CM

## 2018-01-12 DIAGNOSIS — Z23 NEED FOR VACCINATION: ICD-10-CM

## 2018-01-12 DIAGNOSIS — R22.41 MASS OF RIGHT KNEE: ICD-10-CM

## 2018-01-12 DIAGNOSIS — Z79.891 CHRONIC USE OF OPIATE DRUGS THERAPEUTIC PURPOSES: ICD-10-CM

## 2018-01-12 PROCEDURE — 99213 OFFICE O/P EST LOW 20 MIN: CPT | Mod: 25 | Performed by: NURSE PRACTITIONER

## 2018-01-12 PROCEDURE — 90732 PPSV23 VACC 2 YRS+ SUBQ/IM: CPT | Performed by: NURSE PRACTITIONER

## 2018-01-12 PROCEDURE — G0009 ADMIN PNEUMOCOCCAL VACCINE: HCPCS | Performed by: NURSE PRACTITIONER

## 2018-01-12 RX ORDER — VALACYCLOVIR HYDROCHLORIDE 1 G/1
1000 TABLET, FILM COATED ORAL 2 TIMES DAILY
Qty: 60 TAB | Refills: 1 | Status: SHIPPED | OUTPATIENT
Start: 2018-01-12 | End: 2018-02-09 | Stop reason: SDUPTHER

## 2018-01-12 NOTE — ASSESSMENT & PLAN NOTE
She has established with Dr. Patrick who seems to believe it may be severe arthritis with firm fluid collection and has recommended MRI and likelihood of knee replacement. The mass is increasing in size, and becoming more painful.

## 2018-01-12 NOTE — PROGRESS NOTES
Subjective:     Chief Complaint   Patient presents with   • Pain       HPI  Kayla Jensen is a 67 y.o. female here today for routine f/u     Mass of right knee  She has established with Dr. Patrick who seems to believe it may be severe arthritis with firm fluid collection and has recommended MRI and likelihood of knee replacement. The mass is increasing in size, and becoming more painful.     Chronic radicular lumbar pain  Ongoing chronic problem with pain in lower back radiating into left left and foot, treated with gabapentin and narcotic. Will established with PMR Dr. Araiza next month for procedural management.     Chronic use of opiate drugs therapeutic purposes  Last dose of narcotic medication: this morning  Analgesia level: 3-4/10  Activity level: fair  Adverse events: none  Aberrant behavior: none  Affect and mood: calm and pleasant     Nonnarcotic treatments that are being used: gabapentin   Pain management agreement initiated and signed on: 2/1/17  Most recent urine drug screen done July 2017, and is consistent with prescribed medications      Diagnoses of Chronic radicular lumbar pain, Mass of right knee, Chronic use of opiate drugs therapeutic purposes, and Need for vaccination were pertinent to this visit.    Allergies: Aspirin; Ibuprofen; and Tape  Current medicines (including changes today)  Current Outpatient Prescriptions   Medication Sig Dispense Refill   • valacyclovir (VALTREX) 1 GM Tab Take 1 Tab by mouth 2 times a day. 60 Tab 1   • tramadol (ULTRAM) 50 MG Tab TAKE 1 TABLET BY MOUTH EVERY 8 HOURS AS NEEDED 90 Tab 0   • acetaminophen-codeine #3 (TYLENOL #3) 300-30 MG Tab Take 1-2 Tabs by mouth at bedtime as needed for Moderate Pain. 90 Tab 0   • gabapentin (NEURONTIN) 300 MG Cap TAKE 3 CAPSULES BY MOUTH TWICE A  Cap 3   • fexofenadine-pseudoephedrine (ALLEGRA-D)  MG per tablet TAKE 1 TABLET BY MOUTH 2 TIMES A DAY. 180 Tab 3   • lidocaine (LIDODERM) 5 % Patch Apply 1 Patch  "to skin as directed every 24 hours. 30 Patch 3   • albuterol 108 (90 BASE) MCG/ACT Aero Soln inhalation aerosol Inhale 2 Puffs by mouth every 6 hours as needed for Shortness of Breath. 8.5 g 3     No current facility-administered medications for this visit.        She  has a past medical history of Allergy; GERD (gastroesophageal reflux disease); Heart burn; Personal history of venous thrombosis and embolism; and Unspecified hemorrhagic conditions. She also has no past medical history of Angina; Backpain; Bronchitis; Dialysis; Heart valve disease; Indigestion; Infectious disease; Jaundice; Other specified symptom associated with female genital organs; Pacemaker; Pneumonia; Psychiatric problem; Rheumatic fever; or Unspecified urinary incontinence.    Health Maintenance: PPSV 23 due    ROS  As stated in HPI      Objective:     Blood pressure 130/72, pulse 86, temperature 36.7 °C (98.1 °F), resp. rate 12, height 1.6 m (5' 3\"), weight 74.8 kg (165 lb), SpO2 92 %. Body mass index is 29.23 kg/m².  Physical Exam:  General: Alert, oriented, in no acute distress.  Eye contact is good, speech goal directed, affect calm  CNs grossly intact.  HEENT: conjunctiva non-injected, sclera non-icteric, EOMs intact. Glasses in place  Gross hearing intact.  Ext: no edema  Skin: No rashes or lesions in visible areas  Gait steady.     Assessment and Plan:   Assessment/Plan:  1. Chronic radicular lumbar pain  Chronic stable problem. Continue current pain management plan and f/u with PMR next month    2. Mass of right knee  Per recommendation after reviewing ortho notes, will order MRI  - MR-KNEE-W/O RIGHT; Future    3. Chronic use of opiate drugs therapeutic purposes  Obtained and reviewed the patient utilization report state pharmacy database on 1/12/2018.  Based on assessment of report prescription for Tramadol and T#3 is medically necessary.  - Controlled Substance Treatment Agreement    4. Need for vaccination  I have placed the below " orders and discussed them with an approved delegating provider. The MA is performing the below orders under the direction of Dr. Yuen  - PNEUMOCOCCAL 23 VACCINATION    The total time spent seeing the patient in consultation, and formulating an action plan for this visit was 15 minutes.  Greater than 50% of this time was spent face to face counseling, discussing problems documented above, coordinating care and answering questions by the provider.          Follow up:  Return in about 4 months (around 5/12/2018).    Educated in proper administration of medication(s) ordered today including safety, possible SE, risks, benefits, rationale and alternatives to therapy.   Supportive care, differential diagnoses, and indications for immediate follow-up discussed with patient.    Pathogenesis of diagnosis discussed including typical length and natural progression.    Instructed to return to clinic or nearest emergency department for any change in condition, further concerns, or worsening of symptoms.  Patient states understanding of the plan of care and discharge instructions.      Please note that this dictation was created using voice recognition software. I have made every reasonable attempt to correct obvious errors, but I expect that there are errors of grammar and possibly content that I did not discover before finalizing the note.    Followup: Return in about 4 months (around 5/12/2018). sooner should new symptoms or problems arise.

## 2018-01-12 NOTE — ASSESSMENT & PLAN NOTE
Last dose of narcotic medication: this morning  Analgesia level: 3-4/10  Activity level: fair  Adverse events: none  Aberrant behavior: none  Affect and mood: calm and pleasant     Nonnarcotic treatments that are being used: gabapentin   Pain management agreement initiated and signed on: 2/1/17  Most recent urine drug screen done July 2017, and is consistent with prescribed medications

## 2018-01-12 NOTE — ASSESSMENT & PLAN NOTE
Ongoing chronic problem with pain in lower back radiating into left left and foot, treated with gabapentin and narcotic. Will established with PMR Dr. Araiza next month for procedural management.

## 2018-01-16 ENCOUNTER — HOSPITAL ENCOUNTER (OUTPATIENT)
Dept: RADIOLOGY | Facility: MEDICAL CENTER | Age: 68
End: 2018-01-16
Attending: NURSE PRACTITIONER
Payer: MEDICARE

## 2018-01-16 DIAGNOSIS — R22.41 MASS OF RIGHT KNEE: ICD-10-CM

## 2018-01-16 PROCEDURE — 73721 MRI JNT OF LWR EXTRE W/O DYE: CPT | Mod: RT

## 2018-01-30 ENCOUNTER — OFFICE VISIT (OUTPATIENT)
Dept: URGENT CARE | Facility: PHYSICIAN GROUP | Age: 68
End: 2018-01-30
Payer: MEDICARE

## 2018-01-30 VITALS
OXYGEN SATURATION: 95 % | TEMPERATURE: 97.8 F | HEIGHT: 63 IN | SYSTOLIC BLOOD PRESSURE: 132 MMHG | DIASTOLIC BLOOD PRESSURE: 68 MMHG | RESPIRATION RATE: 16 BRPM | HEART RATE: 101 BPM | WEIGHT: 165 LBS | BODY MASS INDEX: 29.23 KG/M2

## 2018-01-30 DIAGNOSIS — J22 LRTI (LOWER RESPIRATORY TRACT INFECTION): ICD-10-CM

## 2018-01-30 DIAGNOSIS — J01.41 ACUTE RECURRENT PANSINUSITIS: ICD-10-CM

## 2018-01-30 PROCEDURE — 99214 OFFICE O/P EST MOD 30 MIN: CPT | Performed by: NURSE PRACTITIONER

## 2018-01-30 RX ORDER — DOXYCYCLINE HYCLATE 100 MG
100 TABLET ORAL 2 TIMES DAILY
Qty: 14 TAB | Refills: 0 | Status: SHIPPED | OUTPATIENT
Start: 2018-01-30 | End: 2018-02-06

## 2018-01-30 ASSESSMENT — ENCOUNTER SYMPTOMS
CHILLS: 0
MYALGIAS: 0
SORE THROAT: 1
SINUS PRESSURE: 1
EYE PAIN: 0
SINUS PAIN: 1
NAUSEA: 0
HEADACHES: 1
SHORTNESS OF BREATH: 0
FEVER: 0
DIZZINESS: 0
COUGH: 1
VOMITING: 0
SPUTUM PRODUCTION: 1

## 2018-01-30 NOTE — PROGRESS NOTES
Subjective:     Kayla Jensen is a 67 y.o. female who presents for URI (x3days productive cough,chest congestion, sinus pressure/headache sore throat)       URI    This is a new problem. The current episode started in the past 7 days. The problem has been gradually worsening. There has been no fever. Associated symptoms include chest pain, congestion, coughing, headaches, sinus pain and a sore throat. Pertinent negatives include no nausea, rash or vomiting. She has tried acetaminophen, decongestant, NSAIDs and sleep for the symptoms. The treatment provided no relief.   Sinusitis   This is a new problem. The current episode started in the past 7 days. The problem has been gradually worsening since onset. There has been no fever. Her pain is at a severity of 6/10. The pain is moderate. Associated symptoms include congestion, coughing, headaches, sinus pressure and a sore throat. Pertinent negatives include no chills or shortness of breath. Past treatments include acetaminophen and oral decongestants. The treatment provided no relief.   Patient has a history of recurrent sinusitis. Patient has not followed up with ENT specialist. Patient states symptoms started 3 days ago and are gradually worsening.  Past Medical History:   Diagnosis Date   • Allergy    • GERD (gastroesophageal reflux disease)    • Heart burn    • Personal history of venous thrombosis and embolism     leg   • Unspecified hemorrhagic conditions     nosebleeds related to anxiety     Past Surgical History:   Procedure Laterality Date   • KNEE ARTHROPLASTY TOTAL  3/18/2009    Performed by SURI PARK at [facility] ORS   • CATARACT EXTRACTION WITH IOL Bilateral    • CYST EXCISION      right breast   • HYSTERECTOMY, VAGINAL     • NM THROAT SURGERY PROCEDURE UNLISTED     • TONSILLECTOMY       Social History     Social History   • Marital status:      Spouse name: N/A   • Number of children: N/A   • Years of education: N/A  "    Occupational History   • Not on file.     Social History Main Topics   • Smoking status: Former Smoker     Packs/day: 1.50     Years: 40.00     Types: Cigarettes     Quit date: 3/1/2002   • Smokeless tobacco: Never Used   • Alcohol use 0.0 oz/week      Comment: 2 a week   • Drug use: No   • Sexual activity: Not Currently     Partners: Male     Other Topics Concern   • Not on file     Social History Narrative    Patient is  and is still working. She recently moved to [place] a few years ago after living in [place] for most of her life.       Family History   Problem Relation Age of Onset   • Heart Disease Mother    • Dementia Mother    • Cancer Brother      lead    Review of Systems   Constitutional: Negative for chills and fever.   HENT: Positive for congestion, sinus pain, sinus pressure and sore throat.    Eyes: Negative for pain.   Respiratory: Positive for cough and sputum production. Negative for shortness of breath.    Cardiovascular: Positive for chest pain.   Gastrointestinal: Negative for nausea and vomiting.   Genitourinary: Negative for hematuria.   Musculoskeletal: Negative for myalgias.   Skin: Negative for rash.   Neurological: Positive for headaches. Negative for dizziness.     Allergies   Allergen Reactions   • Aspirin      Stomach pain   • Ibuprofen      Stomach pain   • Tape Rash      Objective:   /68   Pulse (!) 101   Temp 36.6 °C (97.8 °F)   Resp 16   Ht 1.6 m (5' 3\")   Wt 74.8 kg (165 lb)   SpO2 95%   BMI 29.23 kg/m²   Physical Exam   Constitutional: She is oriented to person, place, and time. She appears well-developed and well-nourished. No distress.   HENT:   Head: Normocephalic and atraumatic.   Right Ear: Tympanic membrane normal.   Left Ear: Tympanic membrane normal.   Nose: Right sinus exhibits maxillary sinus tenderness and frontal sinus tenderness. Left sinus exhibits maxillary sinus tenderness and frontal sinus tenderness.   Mouth/Throat: Uvula is midline. No " posterior oropharyngeal erythema. No tonsillar exudate.   Eyes: Conjunctivae and EOM are normal. Pupils are equal, round, and reactive to light.   Cardiovascular: Normal rate and regular rhythm.    No murmur heard.  Pulmonary/Chest: Effort normal and breath sounds normal. No respiratory distress.   Abdominal: Soft. She exhibits no distension. There is no tenderness.   Neurological: She is alert and oriented to person, place, and time. She has normal reflexes. No sensory deficit.   Skin: Skin is warm and dry.   Psychiatric: She has a normal mood and affect.         Assessment/Plan:   Assessment    1. LRTI (lower respiratory tract infection)  2. Acute recurrent pansinusitis  - doxycycline (VIBRAMYCIN) 100 MG Tab; Take 1 Tab by mouth 2 times a day for 7 days.  Dispense: 14 Tab; Refill: 0  Advised to continue supportive care with Tylenol and/or ibuprofen for fevers and discomfort. Increased fluids and electrolytes.    Patient has prescription for cough syrup. Will not prescribe any more at this time.    Patient given precautionary s/sx that mandate immediate follow up and evaluation in the ED. Advised of risks of not doing so.    DDX, Supportive care, and indications for immediate follow-up discussed with patient.    Instructed to return to clinic or nearest emergency department if we are not available for any change in condition, further concerns, or worsening of symptoms.    The patient demonstrated a good understanding and agreed with the treatment plan.

## 2018-02-02 ENCOUNTER — HOSPITAL ENCOUNTER (OUTPATIENT)
Dept: RADIOLOGY | Facility: MEDICAL CENTER | Age: 68
End: 2018-02-02
Attending: FAMILY MEDICINE
Payer: MEDICARE

## 2018-02-02 ENCOUNTER — OFFICE VISIT (OUTPATIENT)
Dept: URGENT CARE | Facility: PHYSICIAN GROUP | Age: 68
End: 2018-02-02
Payer: MEDICARE

## 2018-02-02 VITALS
DIASTOLIC BLOOD PRESSURE: 68 MMHG | OXYGEN SATURATION: 95 % | WEIGHT: 165 LBS | BODY MASS INDEX: 29.23 KG/M2 | SYSTOLIC BLOOD PRESSURE: 128 MMHG | HEIGHT: 63 IN | HEART RATE: 81 BPM | TEMPERATURE: 98.4 F

## 2018-02-02 DIAGNOSIS — M62.838 MUSCLE SPASM: ICD-10-CM

## 2018-02-02 DIAGNOSIS — R11.0 NAUSEA: ICD-10-CM

## 2018-02-02 DIAGNOSIS — R05.9 COUGH: ICD-10-CM

## 2018-02-02 LAB
FLUAV+FLUBV AG SPEC QL IA: NORMAL
INT CON NEG: NORMAL
INT CON POS: NORMAL

## 2018-02-02 PROCEDURE — 87804 INFLUENZA ASSAY W/OPTIC: CPT | Performed by: FAMILY MEDICINE

## 2018-02-02 PROCEDURE — 71046 X-RAY EXAM CHEST 2 VIEWS: CPT

## 2018-02-02 PROCEDURE — 99214 OFFICE O/P EST MOD 30 MIN: CPT | Performed by: FAMILY MEDICINE

## 2018-02-02 RX ORDER — BACLOFEN 10 MG/1
10 TABLET ORAL 2 TIMES DAILY PRN
Qty: 30 TAB | Refills: 0 | Status: SHIPPED | OUTPATIENT
Start: 2018-02-02 | End: 2018-05-11

## 2018-02-02 RX ORDER — ONDANSETRON 4 MG/1
4 TABLET, ORALLY DISINTEGRATING ORAL EVERY 8 HOURS PRN
Qty: 10 TAB | Refills: 0 | Status: SHIPPED | OUTPATIENT
Start: 2018-02-02 | End: 2018-05-11

## 2018-02-02 ASSESSMENT — PAIN SCALES - GENERAL: PAINLEVEL: NO PAIN

## 2018-02-05 ASSESSMENT — ENCOUNTER SYMPTOMS
CHILLS: 0
FEVER: 0
SHORTNESS OF BREATH: 1
COUGH: 1
WHEEZING: 1
MYALGIAS: 1

## 2018-02-06 NOTE — PROGRESS NOTES
"Subjective:   Kayla Jensen is a 67 y.o. female who presents for Cough (Cough and body aches unable to breath deeply. Pt says doxyc isnt working)        Cough   This is a new problem. The current episode started in the past 7 days. The problem has been gradually worsening. The problem occurs every few minutes. The cough is productive of sputum. Associated symptoms include myalgias, shortness of breath and wheezing. Pertinent negatives include no chills or fever.     Review of Systems   Constitutional: Negative for chills and fever.   Respiratory: Positive for cough, shortness of breath and wheezing.    Musculoskeletal: Positive for myalgias.     Allergies   Allergen Reactions   • Aspirin      Stomach pain   • Ibuprofen      Stomach pain   • Tape Rash      Objective:   /68   Pulse 81   Temp 36.9 °C (98.4 °F)   Ht 1.6 m (5' 3\")   Wt 74.8 kg (165 lb)   SpO2 95%   Breastfeeding? No   BMI 29.23 kg/m²   Physical Exam   Constitutional: She is oriented to person, place, and time. She appears well-developed and well-nourished. No distress.   HENT:   Head: Normocephalic and atraumatic.   Eyes: Conjunctivae and EOM are normal. Pupils are equal, round, and reactive to light.   Cardiovascular: Normal rate, regular rhythm, normal heart sounds and intact distal pulses.    No murmur heard.  Pulmonary/Chest: Effort normal and breath sounds normal. No respiratory distress. She has no wheezes.   Abdominal: Soft. Bowel sounds are normal. She exhibits no distension. There is no tenderness. There is no rebound and no guarding.   Musculoskeletal:        Lumbar back: She exhibits decreased range of motion, tenderness and spasm. She exhibits no bony tenderness and no deformity.   Neurological: She is alert and oriented to person, place, and time. She has normal reflexes. No sensory deficit.   Skin: Skin is warm and dry.   Psychiatric: She has a normal mood and affect. Her behavior is normal.         Assessment/Plan: "   Assessment    1. Cough  - DX-CHEST-2 VIEWS; Future  - POCT Influenza A/B    2. Nausea  - ondansetron (ZOFRAN ODT) 4 MG TABLET DISPERSIBLE; Take 1 Tab by mouth every 8 hours as needed.  Dispense: 10 Tab; Refill: 0    3. Muscle spasm  - baclofen (LIORESAL) 10 MG Tab; Take 1 Tab by mouth 2 times a day as needed.  Dispense: 30 Tab; Refill: 0  Differential diagnosis, natural history, supportive care, and indications for immediate follow-up discussed.

## 2018-02-09 RX ORDER — VALACYCLOVIR HYDROCHLORIDE 1 G/1
1000 TABLET, FILM COATED ORAL 2 TIMES DAILY
Qty: 60 TAB | Refills: 1 | Status: SHIPPED | OUTPATIENT
Start: 2018-02-09 | End: 2018-04-02

## 2018-03-05 ENCOUNTER — PATIENT MESSAGE (OUTPATIENT)
Dept: MEDICAL GROUP | Facility: PHYSICIAN GROUP | Age: 68
End: 2018-03-05

## 2018-03-05 DIAGNOSIS — M54.42 CHRONIC LEFT-SIDED LOW BACK PAIN WITH LEFT-SIDED SCIATICA: ICD-10-CM

## 2018-03-05 DIAGNOSIS — G89.29 CHRONIC LEFT-SIDED LOW BACK PAIN WITH LEFT-SIDED SCIATICA: ICD-10-CM

## 2018-03-23 ENCOUNTER — HOSPITAL ENCOUNTER (OUTPATIENT)
Dept: LAB | Facility: MEDICAL CENTER | Age: 68
End: 2018-03-23
Attending: ORTHOPAEDIC SURGERY
Payer: MEDICARE

## 2018-03-23 LAB
CRP SERPL HS-MCNC: 16.2 MG/L (ref 0–7.5)
ERYTHROCYTE [SEDIMENTATION RATE] IN BLOOD BY WESTERGREN METHOD: 8 MM/HOUR (ref 0–30)

## 2018-03-23 PROCEDURE — 36415 COLL VENOUS BLD VENIPUNCTURE: CPT

## 2018-03-23 PROCEDURE — 86141 C-REACTIVE PROTEIN HS: CPT

## 2018-03-23 PROCEDURE — 85652 RBC SED RATE AUTOMATED: CPT

## 2018-04-02 RX ORDER — VALACYCLOVIR HYDROCHLORIDE 1 G/1
TABLET, FILM COATED ORAL
Qty: 60 TAB | Refills: 1 | Status: SHIPPED | OUTPATIENT
Start: 2018-04-02 | End: 2018-07-20

## 2018-04-02 NOTE — TELEPHONE ENCOUNTER
Was the patient seen in the last year in this department? Yes  LOV 1/12/18    Does patient have an active prescription for medications requested? Yes     Received Request Via: Pharmacy

## 2018-04-07 DIAGNOSIS — G89.29 CHRONIC LEFT-SIDED LOW BACK PAIN WITH LEFT-SIDED SCIATICA: ICD-10-CM

## 2018-04-07 DIAGNOSIS — M54.42 CHRONIC LEFT-SIDED LOW BACK PAIN WITH LEFT-SIDED SCIATICA: ICD-10-CM

## 2018-04-10 RX ORDER — TRAMADOL HYDROCHLORIDE 50 MG/1
TABLET ORAL
Qty: 30 TAB | Refills: 0 | Status: SHIPPED
Start: 2018-04-10 | End: 2018-05-10

## 2018-04-10 NOTE — TELEPHONE ENCOUNTER
Was the patient seen in the last year in this department? Yes  LOV 1/12/18    Does patient have an active prescription for medications requested? No     Received Request Via: Pharmacy

## 2018-04-11 NOTE — TELEPHONE ENCOUNTER
Rx faxed to Pharmacy   [facility] [id number] [facility] - [place] - [address]  [address] [place] [address]  Phone: [phone] Fax: [phone]  4/11/2018 8:34 AM

## 2018-04-12 ENCOUNTER — HOSPITAL ENCOUNTER (OUTPATIENT)
Dept: RADIOLOGY | Facility: MEDICAL CENTER | Age: 68
End: 2018-04-12
Attending: ORTHOPAEDIC SURGERY
Payer: MEDICARE

## 2018-04-12 DIAGNOSIS — Z96.652 PRESENCE OF LEFT ARTIFICIAL KNEE JOINT: ICD-10-CM

## 2018-04-12 PROCEDURE — A9503 TC99M MEDRONATE: HCPCS

## 2018-04-12 PROCEDURE — 73562 X-RAY EXAM OF KNEE 3: CPT | Mod: LT

## 2018-05-11 ENCOUNTER — HOSPITAL ENCOUNTER (OUTPATIENT)
Dept: RADIOLOGY | Facility: REHABILITATION | Age: 68
End: 2018-05-11
Attending: PHYSICAL MEDICINE & REHABILITATION
Payer: MEDICARE

## 2018-05-11 ENCOUNTER — HOSPITAL ENCOUNTER (OUTPATIENT)
Dept: PAIN MANAGEMENT | Facility: REHABILITATION | Age: 68
End: 2018-05-11
Attending: PHYSICAL MEDICINE & REHABILITATION
Payer: MEDICARE

## 2018-05-11 VITALS
WEIGHT: 165.57 LBS | HEART RATE: 76 BPM | TEMPERATURE: 97.3 F | HEIGHT: 63 IN | DIASTOLIC BLOOD PRESSURE: 84 MMHG | OXYGEN SATURATION: 94 % | RESPIRATION RATE: 14 BRPM | BODY MASS INDEX: 29.34 KG/M2 | SYSTOLIC BLOOD PRESSURE: 147 MMHG

## 2018-05-11 PROCEDURE — 700101 HCHG RX REV CODE 250

## 2018-05-11 PROCEDURE — 62323 NJX INTERLAMINAR LMBR/SAC: CPT

## 2018-05-11 PROCEDURE — 700111 HCHG RX REV CODE 636 W/ 250 OVERRIDE (IP)

## 2018-05-11 PROCEDURE — 700117 HCHG RX CONTRAST REV CODE 255

## 2018-05-11 RX ORDER — LIDOCAINE HYDROCHLORIDE 20 MG/ML
INJECTION, SOLUTION EPIDURAL; INFILTRATION; INTRACAUDAL; PERINEURAL
Status: COMPLETED
Start: 2018-05-11 | End: 2018-05-11

## 2018-05-11 RX ORDER — METHYLPREDNISOLONE ACETATE 80 MG/ML
INJECTION, SUSPENSION INTRA-ARTICULAR; INTRALESIONAL; INTRAMUSCULAR; SOFT TISSUE
Status: COMPLETED
Start: 2018-05-11 | End: 2018-05-11

## 2018-05-11 RX ORDER — METHYLPREDNISOLONE ACETATE 40 MG/ML
INJECTION, SUSPENSION INTRA-ARTICULAR; INTRALESIONAL; INTRAMUSCULAR; SOFT TISSUE
Status: COMPLETED
Start: 2018-05-11 | End: 2018-05-11

## 2018-05-11 RX ORDER — TRAMADOL HYDROCHLORIDE 50 MG/1
50 TABLET ORAL EVERY 4 HOURS PRN
COMMUNITY
End: 2018-05-14 | Stop reason: SDUPTHER

## 2018-05-11 RX ADMIN — LIDOCAINE HYDROCHLORIDE 0.5 ML: 20 INJECTION, SOLUTION EPIDURAL; INFILTRATION; INTRACAUDAL; PERINEURAL at 09:53

## 2018-05-11 RX ADMIN — IOHEXOL 1 ML: 240 INJECTION, SOLUTION INTRATHECAL; INTRAVASCULAR; INTRAVENOUS; ORAL at 09:49

## 2018-05-11 RX ADMIN — LIDOCAINE HYDROCHLORIDE 4 ML: 20 INJECTION, SOLUTION EPIDURAL; INFILTRATION; INTRACAUDAL; PERINEURAL at 09:46

## 2018-05-11 RX ADMIN — METHYLPREDNISOLONE ACETATE 80 MG: 80 INJECTION, SUSPENSION INTRA-ARTICULAR; INTRALESIONAL; INTRAMUSCULAR; SOFT TISSUE at 09:53

## 2018-05-11 ASSESSMENT — PAIN SCALES - GENERAL
PAINLEVEL_OUTOF10: 6
PAINLEVEL_OUTOF10: 3

## 2018-05-11 NOTE — PROCEDURES
DATE OF SERVICE:  05/11/2018    PROCEDURES PERFORMED:  Left L5-S1 interlaminar epidural steroid injection with   80 mg of Depo-Medrol under fluoroscopic guidance.    INDICATIONS:  The patient is a patient of [facility] with low   back and left lower extremity pain felt to be related to left L5-S1   radiculitis.  She has done very well with epidural injections previously, but   has had a return of symptoms, so a repeat procedure was chosen for today.    DESCRIPTION OF PROCEDURE:  After appropriate informed consent was obtained,   she was placed prone on the table.    Her skin was thoroughly cleansed with Betadine swabs x3.    Following this, the subcutaneous, intramuscular, and interligamentous region   was anesthetized with lidocaine.    A 3.5-inch 20-gauge Tuohy catheter was directed under intermittent   fluoroscopic guidance to the left L5-S1 lamina.  Once the lamina was detected,   the needle was directed cephalad medially and loss of resistance technique   was used to determine the epidural space.    EPIDUROGRAM:  Omnipaque 1 mL was placed just left of midline at L5-S1.  This   proved to be subligamentous with good cephalad and caudad flow and the flow   was unrestricted.  A lateral view was obtained, which showed the needle tip to   be in the proper position with good dye flow.    Depo-Medrol 80 mg along with 1 mL of 2% lidocaine was placed left of midline   at L5-S1.    She tolerated the procedure well without procedure complications.    She was referred to the recovery area and was seen there.  She was doing well.    She will follow up in the office in the next 2-3 weeks to monitor response to   the injection.       ____________________________________     MD MEGAN Harris / CARYN    DD:  05/11/2018 10:03:19  DT:  05/11/2018 11:15:01    D#:  [id number]  Job#:  [id number]    cc: ELKIN LOCKE MD

## 2018-05-14 ENCOUNTER — OFFICE VISIT (OUTPATIENT)
Dept: MEDICAL GROUP | Facility: PHYSICIAN GROUP | Age: 68
End: 2018-05-14
Payer: MEDICARE

## 2018-05-14 VITALS
HEIGHT: 63 IN | OXYGEN SATURATION: 95 % | BODY MASS INDEX: 29.23 KG/M2 | TEMPERATURE: 98.2 F | WEIGHT: 165 LBS | SYSTOLIC BLOOD PRESSURE: 148 MMHG | DIASTOLIC BLOOD PRESSURE: 92 MMHG | HEART RATE: 83 BPM

## 2018-05-14 DIAGNOSIS — M17.11 PRIMARY OSTEOARTHRITIS OF RIGHT KNEE: ICD-10-CM

## 2018-05-14 DIAGNOSIS — G89.29 CHRONIC LEFT-SIDED LOW BACK PAIN WITH LEFT-SIDED SCIATICA: ICD-10-CM

## 2018-05-14 DIAGNOSIS — M54.42 CHRONIC LEFT-SIDED LOW BACK PAIN WITH LEFT-SIDED SCIATICA: ICD-10-CM

## 2018-05-14 DIAGNOSIS — L98.9 SKIN LESION OF CHEST WALL: ICD-10-CM

## 2018-05-14 DIAGNOSIS — Z79.891 CHRONIC USE OF OPIATE DRUGS THERAPEUTIC PURPOSES: ICD-10-CM

## 2018-05-14 PROCEDURE — 99213 OFFICE O/P EST LOW 20 MIN: CPT | Performed by: NURSE PRACTITIONER

## 2018-05-14 RX ORDER — TRAMADOL HYDROCHLORIDE 50 MG/1
50-100 TABLET ORAL
Qty: 60 TAB | Refills: 0 | Status: SHIPPED
Start: 2018-05-14 | End: 2018-05-14 | Stop reason: SDUPTHER

## 2018-05-14 RX ORDER — TRAMADOL HYDROCHLORIDE 50 MG/1
50-100 TABLET ORAL
Qty: 60 TAB | Refills: 0 | Status: SHIPPED
Start: 2018-06-13 | End: 2018-07-13

## 2018-05-14 NOTE — ASSESSMENT & PLAN NOTE
Ongoing problem followed by ortho Dr. Patrick. There is a large bone spur and they have recommended replacement, but she does not want to do this at this time. Instead she wears a brace and will do this until she cannot tolerate it anymore. Also managing with Tylenol 3 and tramadol.

## 2018-05-14 NOTE — ASSESSMENT & PLAN NOTE
Last dose of narcotic medication: T# 3 4-5 days ago, and Tramadol this morning   Analgesia: 4-5/10  Activity level: fair, but walking is becoming more difficult  Adverse events: none  Aberrant behavior: none  Affect and mood: calm and pleasant   Nonnarcotic treatments that are being used: gabapentin, lidocaine patches, epidurals   Pain management agreement initiated and signed on: reinstated today  Most recent urine drug screen done July 2017, and is consistent with prescribed medications  Opiate risk score: 0  Total daily opiate dosage: morphine 9.5 mEq daily

## 2018-05-14 NOTE — ASSESSMENT & PLAN NOTE
Ongoing chronic problem with pain in lower back radiating in left leg and foot. The pain is worsening over time. Treated with gabapentin, Tramadol in am, Tylenol #3 in pm only (because it causes drowsiness), lidocaine patches, and procedural management with Dr. Araiza. She just had epidural yesterday, but so far the pain has increased.

## 2018-05-15 NOTE — PROGRESS NOTES
Subjective:     Chief Complaint   Patient presents with   • Back Pain     med review       HPI  Kayla Jensen is a 67 y.o. female here today for routine pain f/u. she has a skin lesion of her right upper chest that has been present for a couple of months and does seem to cause some tenderness. It is not changing in size or shape, but she would like to see a dermatologist for evaluation. She is up-to-date on her mammogram screening.    Chronic left-sided low back pain with left-sided sciatica  Ongoing chronic problem with pain in lower back radiating in left leg and foot. The pain is worsening over time. Treated with gabapentin, Tramadol in am, Tylenol #3 in pm only (because it causes drowsiness), lidocaine patches, and procedural management with Dr. Araiza. She just had epidural yesterday, but so far the pain has increased.     Chronic use of opiate drugs therapeutic purposes  Last dose of narcotic medication: T# 3 4-5 days ago, and Tramadol this morning   Analgesia: 4-5/10  Activity level: fair, but walking is becoming more difficult  Adverse events: none  Aberrant behavior: none  Affect and mood: calm and pleasant   Nonnarcotic treatments that are being used: gabapentin, lidocaine patches, epidurals   Pain management agreement initiated and signed on: reinstated today  Most recent urine drug screen done July 2017, and is consistent with prescribed medications  Opiate risk score: 0  Total daily opiate dosage: morphine 9.5 mEq daily      Primary osteoarthritis of right knee  Ongoing problem followed by ortho Dr. Patrick. There is a large bone spur and they have recommended replacement, but she does not want to do this at this time. Instead she wears a brace and will do this until she cannot tolerate it anymore. Also managing with Tylenol 3 and tramadol.       Diagnoses of Chronic left-sided low back pain with left-sided sciatica, Primary osteoarthritis of right knee, Chronic use of opiate drugs  "therapeutic purposes, and Skin lesion of chest wall were pertinent to this visit.    Allergies: Aspirin; Ibuprofen; and Tape  Current medicines (including changes today)  Current Outpatient Prescriptions   Medication Sig Dispense Refill   • [START ON 6/13/2018] tramadol (ULTRAM) 50 MG Tab Take 1-2 Tabs by mouth 1 time daily as needed for Severe Pain (in AM) for up to 30 days. 60 Tab 0   • [START ON 6/13/2018] acetaminophen-codeine #3 (TYLENOL #3) 300-30 MG Tab Take 1-2 Tabs by mouth at bedtime as needed for Severe Pain for up to 30 days. 60 Tab 0   • valacyclovir (VALTREX) 1 GM Tab TAKE 1 TABLET BY MOUTH TWICE A DAY 60 Tab 1   • gabapentin (NEURONTIN) 300 MG Cap TAKE 3 CAPSULES BY MOUTH TWICE A  Cap 3   • fexofenadine-pseudoephedrine (ALLEGRA-D)  MG per tablet TAKE 1 TABLET BY MOUTH 2 TIMES A DAY. 180 Tab 3   • lidocaine (LIDODERM) 5 % Patch Apply 1 Patch to skin as directed every 24 hours. 30 Patch 3   • albuterol 108 (90 BASE) MCG/ACT Aero Soln inhalation aerosol Inhale 2 Puffs by mouth every 6 hours as needed for Shortness of Breath. 8.5 g 3     No current facility-administered medications for this visit.        She  has a past medical history of Allergy; GERD (gastroesophageal reflux disease); Heart burn; Personal history of venous thrombosis and embolism; and Unspecified hemorrhagic conditions. She also has no past medical history of Angina; Backpain; Bronchitis; Dialysis; Heart valve disease; Indigestion; Infectious disease; Jaundice; Other specified symptom associated with female genital organs; Pacemaker; Pneumonia; Psychiatric problem; Rheumatic fever; or Unspecified urinary incontinence.    Health Maintenance: UTD    ROS  As stated in HPI      Objective:     Blood pressure 148/92, pulse 83, temperature 36.8 °C (98.2 °F), height 1.6 m (5' 3\"), weight 74.8 kg (165 lb), SpO2 95 %. Body mass index is 29.23 kg/m².  Physical Exam:  General: Alert, oriented, in no acute distress.  Eye contact is " good, speech goal directed, affect calm  CNs grossly intact.  HEENT: conjunctiva non-injected, sclera non-icteric, EOMs intact.   Gross hearing intact.  Skin: right upper chest with flat, light brown, oval skin lesion. No asymmetry, irregular borders, abnormal color  Gait steady.     Assessment and Plan:   Assessment/Plan:  1. Chronic left-sided low back pain with left-sided sciatica  Chronic stable problem continue current pain management plan  - tramadol (ULTRAM) 50 MG Tab; Take 1-2 Tabs by mouth 1 time daily as needed for Severe Pain (in AM) for up to 30 days.  Dispense: 60 Tab; Refill: 0  - acetaminophen-codeine #3 (TYLENOL #3) 300-30 MG Tab; Take 1-2 Tabs by mouth at bedtime as needed for Severe Pain for up to 30 days.  Dispense: 60 Tab; Refill: 0    2. Primary osteoarthritis of right knee  Chronic stable problem continue current pain management plan  - tramadol (ULTRAM) 50 MG Tab; Take 1-2 Tabs by mouth 1 time daily as needed for Severe Pain (in AM) for up to 30 days.  Dispense: 60 Tab; Refill: 0    3. Chronic use of opiate drugs therapeutic purposes  Obtained and reviewed the patient utilization report state pharmacy database on 5/14/2018.  Based on assessment of report prescription for Tramadol and T#3 is medically necessary. Patient has not requested any early refills and exhibits no abberant behavior. I have advised patient to keep medication and safe place and to not drive, use alcohol, or take illicit drugs while taking this medication.  - [facility] SCREEN; Future  - Controlled Substance Treatment Agreement    4. Skin lesion of chest wall  - REFERRAL TO DERMATOLOGY    The total time spent seeing the patient in consultation, and formulating an action plan for this visit was 20 minutes.  Greater than 50% of this time was spent face to face counseling, discussing problems documented above, coordinating care and answering questions by the provider.          Follow up:  Return in about 3  months (around 8/14/2018).    Educated in proper administration of medication(s) ordered today including safety, possible SE, risks, benefits, rationale and alternatives to therapy.   Supportive care, differential diagnoses, and indications for immediate follow-up discussed with patient.    Pathogenesis of diagnosis discussed including typical length and natural progression.    Instructed to return to clinic or nearest emergency department for any change in condition, further concerns, or worsening of symptoms.  Patient states understanding of the plan of care and discharge instructions.      Please note that this dictation was created using voice recognition software. I have made every reasonable attempt to correct obvious errors, but I expect that there are errors of grammar and possibly content that I did not discover before finalizing the note.    Followup: Return in about 3 months (around 8/14/2018). sooner should new symptoms or problems arise.

## 2018-05-30 ENCOUNTER — OFFICE VISIT (OUTPATIENT)
Dept: URGENT CARE | Facility: PHYSICIAN GROUP | Age: 68
End: 2018-05-30
Payer: MEDICARE

## 2018-05-30 VITALS
WEIGHT: 165 LBS | RESPIRATION RATE: 15 BRPM | OXYGEN SATURATION: 95 % | TEMPERATURE: 98.5 F | DIASTOLIC BLOOD PRESSURE: 94 MMHG | HEIGHT: 63 IN | SYSTOLIC BLOOD PRESSURE: 148 MMHG | HEART RATE: 78 BPM | BODY MASS INDEX: 29.23 KG/M2

## 2018-05-30 DIAGNOSIS — J01.00 ACUTE MAXILLARY SINUSITIS, RECURRENCE NOT SPECIFIED: ICD-10-CM

## 2018-05-30 DIAGNOSIS — R05.9 COUGH: ICD-10-CM

## 2018-05-30 PROCEDURE — 99214 OFFICE O/P EST MOD 30 MIN: CPT | Performed by: PHYSICIAN ASSISTANT

## 2018-05-30 RX ORDER — EUCALYPTUS/PEPPERMINT OIL
10 SOLUTION, NON-ORAL NASAL EVERY 6 HOURS PRN
Qty: 1 BOTTLE | Refills: 2 | Status: SHIPPED | OUTPATIENT
Start: 2018-05-30 | End: 2019-12-06

## 2018-05-30 RX ORDER — GUAIFENESIN 600 MG/1
600 TABLET, EXTENDED RELEASE ORAL PRN
COMMUNITY
End: 2019-03-01

## 2018-05-30 RX ORDER — AMOXICILLIN AND CLAVULANATE POTASSIUM 875; 125 MG/1; MG/1
1 TABLET, FILM COATED ORAL 2 TIMES DAILY
Qty: 20 TAB | Refills: 0 | Status: SHIPPED | OUTPATIENT
Start: 2018-05-30 | End: 2018-07-20

## 2018-05-30 ASSESSMENT — ENCOUNTER SYMPTOMS
WHEEZING: 0
ABDOMINAL PAIN: 0
SPUTUM PRODUCTION: 1
MYALGIAS: 0
CHILLS: 0
DIARRHEA: 0
SHORTNESS OF BREATH: 0
EYE DISCHARGE: 0
COUGH: 1
SINUS PAIN: 1
SORE THROAT: 1
NAUSEA: 0
EYE REDNESS: 0
VOMITING: 0
FEVER: 0

## 2018-05-30 NOTE — PROGRESS NOTES
"Subjective:   Kayla Jensen is a 67 y.o. female who presents for Sinus Problem (pain and pressure, cough x 5 days)        Sinus Problem   This is a new problem. The current episode started in the past 7 days (5d). Associated symptoms include congestion, coughing and a sore throat. Pertinent negatives include no chills, ear pain or shortness of breath.   notes last 5d of prod cough, sinus pressure and congestion, denies fever/chills, c/o ST/fatigue, denies emesis/abdpain/diarrhea/rash, c/o nausea, PMH of sinusitis, PMH of bronchitis/pneumonia years ago, seasonal allerg - takes allegra D.      Review of Systems   Constitutional: Negative for chills and fever.   HENT: Positive for congestion, sinus pain and sore throat. Negative for ear pain.    Eyes: Negative for discharge and redness.   Respiratory: Positive for cough and sputum production. Negative for shortness of breath and wheezing.    Cardiovascular: Negative for chest pain and leg swelling.   Gastrointestinal: Negative for abdominal pain, diarrhea, nausea and vomiting.   Musculoskeletal: Negative for myalgias.   Skin: Negative for rash.   Endo/Heme/Allergies: Positive for environmental allergies.     Allergies   Allergen Reactions   • Aspirin      Stomach pain   • Ibuprofen      Stomach pain   • Tape Rash     I have worn a mask for the entire encounter with this patient.    Objective:   /94   Pulse 78   Temp 36.9 °C (98.5 °F)   Resp 15   Ht 1.6 m (5' 3\")   Wt 74.8 kg (165 lb)   SpO2 95%   BMI 29.23 kg/m²   Physical Exam   Constitutional: She is oriented to person, place, and time. She appears well-developed and well-nourished. No distress.   HENT:   Head: Normocephalic and atraumatic.   Right Ear: External ear and ear canal normal. Tympanic membrane is bulging. Tympanic membrane is not erythematous.   Left Ear: External ear and ear canal normal. Tympanic membrane is bulging. Tympanic membrane is not erythematous.   Nose: Right sinus " exhibits maxillary sinus tenderness. Right sinus exhibits no frontal sinus tenderness. Left sinus exhibits maxillary sinus tenderness. Left sinus exhibits no frontal sinus tenderness.   Mouth/Throat: Uvula is midline and mucous membranes are normal. Posterior oropharyngeal erythema ( PND) present. No oropharyngeal exudate, posterior oropharyngeal edema or tonsillar abscesses.   Eyes: Conjunctivae and lids are normal. Right eye exhibits no discharge. Left eye exhibits no discharge. No scleral icterus.   Neck: Neck supple.   Pulmonary/Chest: Effort normal. No respiratory distress. She has no decreased breath sounds. She has no wheezes. She has no rhonchi. She has no rales.   Musculoskeletal: Normal range of motion.   Lymphadenopathy:     She has cervical adenopathy ( mild bilat).   Neurological: She is alert and oriented to person, place, and time. She is not disoriented.   Skin: Skin is warm and dry. She is not diaphoretic. No erythema. No pallor.   Psychiatric: Her speech is normal and behavior is normal.   Nursing note and vitals reviewed.        Assessment/Plan:   Assessment    1. Acute maxillary sinusitis, recurrence not specified  - amoxicillin-clavulanate (AUGMENTIN) 875-125 MG Tab; Take 1 Tab by mouth 2 times a day.  Dispense: 20 Tab; Refill: 0  - eucalyptus/peppermint oil (PONARIS NASAL) Solution; Spray 10 Drops in nose every 6 hours as needed.  Dispense: 1 Bottle; Refill: 2    2. Cough    Other orders  - guaiFENesin LA (MUCINEX) 600 MG TABLET SR 12 HR; Take 600 mg by mouth every 12 hours.    Supportive care is reviewed with patient/caregiver - recommend to push PO fluids and electrolytes, Nsaids/tylenol, netti pot/saline irrig, humidifier in home, flonase, ponaris, antihistamines,  take full course of Rx, take with probiotics, observe for resolution  Return to clinic with lack of resolution or progression of symptoms.    Differential diagnosis, natural history, supportive care, and indications for immediate  follow-up discussed.

## 2018-06-18 ENCOUNTER — OFFICE VISIT (OUTPATIENT)
Dept: URGENT CARE | Facility: PHYSICIAN GROUP | Age: 68
End: 2018-06-18
Payer: MEDICARE

## 2018-06-18 VITALS
DIASTOLIC BLOOD PRESSURE: 82 MMHG | WEIGHT: 165 LBS | TEMPERATURE: 97.4 F | HEART RATE: 84 BPM | RESPIRATION RATE: 14 BRPM | OXYGEN SATURATION: 94 % | BODY MASS INDEX: 29.23 KG/M2 | HEIGHT: 63 IN | SYSTOLIC BLOOD PRESSURE: 116 MMHG

## 2018-06-18 DIAGNOSIS — J20.9 ACUTE BRONCHITIS, UNSPECIFIED ORGANISM: ICD-10-CM

## 2018-06-18 PROCEDURE — 99214 OFFICE O/P EST MOD 30 MIN: CPT | Performed by: NURSE PRACTITIONER

## 2018-06-18 RX ORDER — DOXYCYCLINE HYCLATE 100 MG
100 TABLET ORAL 2 TIMES DAILY
Qty: 14 TAB | Refills: 0 | Status: SHIPPED | OUTPATIENT
Start: 2018-06-18 | End: 2018-07-20

## 2018-06-18 RX ORDER — METHYLPREDNISOLONE 4 MG/1
TABLET ORAL
Qty: 1 KIT | Refills: 0 | Status: SHIPPED | OUTPATIENT
Start: 2018-06-18 | End: 2018-07-20

## 2018-06-18 RX ORDER — FEXOFENADINE HCL AND PSEUDOEPHEDRINE HCI 60; 120 MG/1; MG/1
TABLET, EXTENDED RELEASE ORAL
Qty: 60 TAB | Refills: 3 | Status: SHIPPED | OUTPATIENT
Start: 2018-06-18 | End: 2019-10-07

## 2018-06-18 RX ORDER — ALBUTEROL SULFATE 90 UG/1
2 AEROSOL, METERED RESPIRATORY (INHALATION) EVERY 6 HOURS PRN
Qty: 8.5 G | Refills: 0 | Status: SHIPPED | OUTPATIENT
Start: 2018-06-18 | End: 2018-07-20

## 2018-06-18 ASSESSMENT — ENCOUNTER SYMPTOMS
DIARRHEA: 0
CHILLS: 0
SHORTNESS OF BREATH: 0
ORTHOPNEA: 0
MYALGIAS: 0
HEADACHES: 0
SPUTUM PRODUCTION: 0
NAUSEA: 0
FEVER: 0
EYE DISCHARGE: 0
WHEEZING: 0
COUGH: 1

## 2018-06-18 ASSESSMENT — PAIN SCALES - GENERAL: PAINLEVEL: NO PAIN

## 2018-06-18 NOTE — PROGRESS NOTES
Subjective:      Kayla Jensen is a 67 y.o. female who presents with Cough (chest congestion, x 2 weeks)            HPI Recurrent problem. 67 year old female with cough and congestion x 2 weeks. Her cough is dry and causes her chest to hurt. She denies shortness of breath or wheezing. Was seen 2 weeks ago and rx'd augmentin, no relief with that. She denies fever, chills, myalgia, headache or sore throat. Taking OTC allegraD and mucinex.  Aspirin; Ibuprofen; and Tape  Current Outpatient Prescriptions on File Prior to Visit   Medication Sig Dispense Refill   • tramadol (ULTRAM) 50 MG Tab Take 1-2 Tabs by mouth 1 time daily as needed for Severe Pain (in AM) for up to 30 days. 60 Tab 0   • acetaminophen-codeine #3 (TYLENOL #3) 300-30 MG Tab Take 1-2 Tabs by mouth at bedtime as needed for Severe Pain for up to 30 days. 60 Tab 0   • gabapentin (NEURONTIN) 300 MG Cap TAKE 3 CAPSULES BY MOUTH TWICE A  Cap 3   • fexofenadine-pseudoephedrine (ALLEGRA-D)  MG per tablet TAKE 1 TABLET BY MOUTH 2 TIMES A DAY. 180 Tab 3   • guaiFENesin LA (MUCINEX) 600 MG TABLET SR 12 HR Take 600 mg by mouth every 12 hours.     • amoxicillin-clavulanate (AUGMENTIN) 875-125 MG Tab Take 1 Tab by mouth 2 times a day. 20 Tab 0   • eucalyptus/peppermint oil (PONARIS NASAL) Solution Spray 10 Drops in nose every 6 hours as needed. 1 Bottle 2   • valacyclovir (VALTREX) 1 GM Tab TAKE 1 TABLET BY MOUTH TWICE A DAY 60 Tab 1   • lidocaine (LIDODERM) 5 % Patch Apply 1 Patch to skin as directed every 24 hours. 30 Patch 3   • albuterol 108 (90 BASE) MCG/ACT Aero Soln inhalation aerosol Inhale 2 Puffs by mouth every 6 hours as needed for Shortness of Breath. 8.5 g 3     No current facility-administered medications on file prior to visit.      Social History     Social History   • Marital status:      Spouse name: N/A   • Number of children: N/A   • Years of education: N/A     Occupational History   • Not on file.     Social History  "Main Topics   • Smoking status: Former Smoker     Packs/day: 1.50     Years: 40.00     Types: Cigarettes     Quit date: 3/1/2002   • Smokeless tobacco: Never Used   • Alcohol use 0.0 oz/week      Comment: 2 a week   • Drug use: No   • Sexual activity: Not Currently     Partners: Male     Other Topics Concern   • Not on file     Social History Narrative    Patient is  and is still working. She recently moved to [place] a few years ago after living in [place] for most of her life.      family history includes Cancer in her brother; Dementia in her mother; Heart Disease in her mother.      Review of Systems   Constitutional: Positive for malaise/fatigue. Negative for chills and fever.   HENT: Positive for congestion.    Eyes: Negative for discharge.   Respiratory: Positive for cough. Negative for sputum production, shortness of breath and wheezing.    Cardiovascular: Negative for chest pain and orthopnea.   Gastrointestinal: Negative for diarrhea and nausea.   Musculoskeletal: Negative for myalgias.   Neurological: Negative for headaches.   Endo/Heme/Allergies: Negative for environmental allergies.          Objective:     /82   Pulse 84   Temp 36.3 °C (97.4 °F)   Resp 14   Ht 1.6 m (5' 3\")   Wt 74.8 kg (165 lb)   SpO2 94%   BMI 29.23 kg/m²      Physical Exam   Constitutional: She is oriented to person, place, and time. She appears well-developed and well-nourished. No distress.   HENT:   Head: Normocephalic and atraumatic.   Right Ear: External ear and ear canal normal. Tympanic membrane is not injected and not perforated. No middle ear effusion.   Left Ear: External ear and ear canal normal. Tympanic membrane is not injected and not perforated.  No middle ear effusion.   Nose: Mucosal edema present.   Mouth/Throat: No oropharyngeal exudate or posterior oropharyngeal erythema.   Eyes: Conjunctivae are normal. Right eye exhibits no discharge. Left eye exhibits no discharge.   Neck: Normal range of " motion. Neck supple.   Cardiovascular: Normal rate, regular rhythm and normal heart sounds.    No murmur heard.  Pulmonary/Chest: Effort normal and breath sounds normal. No respiratory distress.   Musculoskeletal: Normal range of motion.   Normal movement of all 4 extremities.   Lymphadenopathy:     She has no cervical adenopathy.        Right: No supraclavicular adenopathy present.        Left: No supraclavicular adenopathy present.   Neurological: She is alert and oriented to person, place, and time. Gait normal.   Skin: Skin is warm and dry.   Psychiatric: She has a normal mood and affect. Her behavior is normal. Thought content normal.   Nursing note and vitals reviewed.              Assessment/Plan:     1. Acute bronchitis, unspecified organism  doxycycline (VIBRAMYCIN) 100 MG Tab    MethylPREDNISolone (MEDROL DOSEPAK) 4 MG Tablet Therapy Pack    albuterol 108 (90 Base) MCG/ACT Aero Soln inhalation aerosol     Differential diagnosis, natural history, supportive care, and indications for immediate follow-up discussed at length.

## 2018-07-03 ENCOUNTER — OFFICE VISIT (OUTPATIENT)
Dept: URGENT CARE | Facility: PHYSICIAN GROUP | Age: 68
End: 2018-07-03
Payer: MEDICARE

## 2018-07-03 VITALS
HEIGHT: 63 IN | RESPIRATION RATE: 16 BRPM | TEMPERATURE: 98.3 F | WEIGHT: 165 LBS | BODY MASS INDEX: 29.23 KG/M2 | OXYGEN SATURATION: 98 % | HEART RATE: 70 BPM | DIASTOLIC BLOOD PRESSURE: 78 MMHG | SYSTOLIC BLOOD PRESSURE: 144 MMHG

## 2018-07-03 DIAGNOSIS — M25.561 ACUTE PAIN OF RIGHT KNEE: ICD-10-CM

## 2018-07-03 DIAGNOSIS — S83.241D ACUTE MEDIAL MENISCUS TEAR OF RIGHT KNEE, SUBSEQUENT ENCOUNTER: ICD-10-CM

## 2018-07-03 PROCEDURE — 99214 OFFICE O/P EST MOD 30 MIN: CPT | Performed by: PHYSICIAN ASSISTANT

## 2018-07-03 ASSESSMENT — ENCOUNTER SYMPTOMS
SORE THROAT: 0
CHILLS: 0
EYE DISCHARGE: 0
COUGH: 0
JOINT SWELLING: 1
TINGLING: 0
HEADACHES: 0
FALLS: 0
FEVER: 0
EYE REDNESS: 0
BACK PAIN: 0

## 2018-07-03 NOTE — PROGRESS NOTES
"Subjective:      Kayla Jensen is a 67 y.o. female who presents with Knee Pain (x2days R knee, poss referral needed)            She is a 67-year-old female who presents with continued right knee pain for several months worse the last 2 days.  Patient does report prior history of right meniscus tear with bone spurring.  Patient has been a patient of Dr. Patrick and in the recent past of which suggested surgical repair however the patient deferred at that time as she wanted to \"wait until the pain was too bad \".  Patient reports the last 2 days the pain is significantly gotten worse requiring patient to utilize her cane and her right knee immobilizer.  She denies any new fall, or trauma.  Patient is requesting new referral to see Ortho  for further evaluation and management today.      Knee Pain   This is a chronic problem. The current episode started more than 1 month ago. The problem occurs constantly. The problem has been gradually worsening. Associated symptoms include joint swelling. Pertinent negatives include no chills, congestion, coughing, fever, headaches, rash or sore throat. Exacerbated by: Walking. Treatments tried: Chronic pain meds, knee brace and cane.  The treatment provided mild relief.       Review of Systems   Constitutional: Negative for chills, fever and malaise/fatigue.   HENT: Negative for congestion and sore throat.    Eyes: Negative for discharge and redness.   Respiratory: Negative for cough.    Musculoskeletal: Positive for joint pain and joint swelling. Negative for back pain and falls.   Skin: Negative for itching and rash.   Neurological: Negative for tingling and headaches.   All other systems reviewed and are negative.         Objective:     /78   Pulse 70   Temp 36.8 °C (98.3 °F)   Resp 16   Ht 1.6 m (5' 3\")   Wt 74.8 kg (165 lb)   SpO2 98%   BMI 29.23 kg/m²    PMH:  has a past medical history of Allergy; GERD (gastroesophageal reflux disease); Heart burn; " Personal history of venous thrombosis and embolism; and Unspecified hemorrhagic conditions. She also has no past medical history of Angina; Backpain; Bronchitis; Dialysis; Heart valve disease; Indigestion; Infectious disease; Jaundice; Other specified symptom associated with female genital organs; Pacemaker; Pneumonia; Psychiatric problem; Rheumatic fever; or Unspecified urinary incontinence.  MEDS:   Current Outpatient Prescriptions:   •  tramadol (ULTRAM) 50 MG Tab, Take 1-2 Tabs by mouth 1 time daily as needed for Severe Pain (in AM) for up to 30 days., Disp: 60 Tab, Rfl: 0  •  acetaminophen-codeine #3 (TYLENOL #3) 300-30 MG Tab, Take 1-2 Tabs by mouth at bedtime as needed for Severe Pain for up to 30 days., Disp: 60 Tab, Rfl: 0  •  gabapentin (NEURONTIN) 300 MG Cap, TAKE 3 CAPSULES BY MOUTH TWICE A DAY, Disp: 540 Cap, Rfl: 3  •  fexofenadine-pseudoephedrine (ALLEGRA-D)  MG per tablet, TAKE 1 TABLET BY MOUTH 2 TIMES A DAY., Disp: 60 Tab, Rfl: 3  •  doxycycline (VIBRAMYCIN) 100 MG Tab, Take 1 Tab by mouth 2 times a day., Disp: 14 Tab, Rfl: 0  •  MethylPREDNISolone (MEDROL DOSEPAK) 4 MG Tablet Therapy Pack, UAD, Disp: 1 Kit, Rfl: 0  •  albuterol 108 (90 Base) MCG/ACT Aero Soln inhalation aerosol, Inhale 2 Puffs by mouth every 6 hours as needed., Disp: 8.5 g, Rfl: 0  •  guaiFENesin LA (MUCINEX) 600 MG TABLET SR 12 HR, Take 600 mg by mouth every 12 hours., Disp: , Rfl:   •  amoxicillin-clavulanate (AUGMENTIN) 875-125 MG Tab, Take 1 Tab by mouth 2 times a day., Disp: 20 Tab, Rfl: 0  •  eucalyptus/peppermint oil (PONARIS NASAL) Solution, Spray 10 Drops in nose every 6 hours as needed., Disp: 1 Bottle, Rfl: 2  •  valacyclovir (VALTREX) 1 GM Tab, TAKE 1 TABLET BY MOUTH TWICE A DAY, Disp: 60 Tab, Rfl: 1  •  lidocaine (LIDODERM) 5 % Patch, Apply 1 Patch to skin as directed every 24 hours., Disp: 30 Patch, Rfl: 3  •  albuterol 108 (90 BASE) MCG/ACT Aero Soln inhalation aerosol, Inhale 2 Puffs by mouth every 6 hours as  needed for Shortness of Breath., Disp: 8.5 g, Rfl: 3  ALLERGIES:   Allergies   Allergen Reactions   • Aspirin      Stomach pain   • Ibuprofen      Stomach pain   • Tape Rash     SURGHX:   Past Surgical History:   Procedure Laterality Date   • KNEE ARTHROPLASTY TOTAL  3/18/2009    Performed by SURI PARK at SURGERY [facility] ORS   • CATARACT EXTRACTION WITH IOL Bilateral    • CYST EXCISION      right breast   • HYSTERECTOMY, VAGINAL     • WA THROAT SURGERY PROCEDURE UNLISTED     • TONSILLECTOMY       SOCHX:  reports that she quit smoking about 16 years ago. Her smoking use included Cigarettes. She has a 60.00 pack-year smoking history. She has never used smokeless tobacco. She reports that she drinks alcohol. She reports that she does not use drugs.  FH: Family history was reviewed, no pertinent findings to report    Physical Exam   Constitutional: She is oriented to person, place, and time. She appears well-developed and well-nourished. No distress.   HENT:   Head: Normocephalic and atraumatic.   Eyes: Conjunctivae and EOM are normal. Pupils are equal, round, and reactive to light.   Neck: Normal range of motion. Neck supple. No tracheal deviation present.   Cardiovascular: Normal rate.    Pulmonary/Chest: Effort normal.   Musculoskeletal:        Right knee: She exhibits decreased range of motion, swelling, effusion, bony tenderness and abnormal meniscus. Tenderness found. Medial joint line tenderness noted.        Legs:  Antalgic gait- walks with cane.      Neurological: She is alert and oriented to person, place, and time.   Skin: Skin is warm. No rash noted.   Psychiatric: She has a normal mood and affect. Her behavior is normal. Judgment and thought content normal.   Vitals reviewed.            Prior MRI: (March)    1.  Large complex medial meniscus tear with body extrusion and adjacent 3.6 cm cyst. This correlates well with the provided history of a medial knee mass. As with any palpable abnormality  follow up on clinical grounds is recommended    2.  Patellar greater than medial femoral full-thickness cartilage defects and moderate spurring    3.  Mild lateral cartilage fissuring and spurring    4.  Medium-sized Baker's cyst, small knee effusion    Assessment/Plan:     1. Acute pain of right knee  - REFERRAL TO ORTHOPEDICS    2. Acute medial meniscus tear of right knee, subsequent encounter  - REFERRAL TO ORTHOPEDICS    Referral to Ortho at this time for further evaluation and management.  Patient is to continue on pain medication which she has at home and is to continue to utilize knee bracing along with cane as tolerated.  Patient is to return to clinic as needed or for any further concern.  F/U with Ortho.

## 2018-07-03 NOTE — LETTER
July 3, 2018         Patient: Kayla Jensen   YOB: 1950   Date of Visit: 7/3/2018           To Whom it May Concern:    Kayla Jensen was seen in my clinic on 7/3/2018. Please excuse this patient from work due to recent ailment.     If you have any questions or concerns, please don't hesitate to call.        Sincerely,           Jose Burns P.A.-C.  Electronically Signed

## 2018-07-04 ENCOUNTER — PATIENT MESSAGE (OUTPATIENT)
Dept: MEDICAL GROUP | Facility: PHYSICIAN GROUP | Age: 68
End: 2018-07-04

## 2018-07-04 DIAGNOSIS — M25.561 ACUTE PAIN OF RIGHT KNEE: ICD-10-CM

## 2018-07-20 ENCOUNTER — APPOINTMENT (OUTPATIENT)
Dept: ADMISSIONS | Facility: MEDICAL CENTER | Age: 68
End: 2018-07-20
Attending: ORTHOPAEDIC SURGERY
Payer: MEDICARE

## 2018-07-20 DIAGNOSIS — Z01.810 PRE-OPERATIVE CARDIOVASCULAR EXAMINATION: ICD-10-CM

## 2018-07-20 DIAGNOSIS — Z01.812 PRE-OPERATIVE LABORATORY EXAMINATION: ICD-10-CM

## 2018-07-20 LAB
EKG IMPRESSION: NORMAL
ERYTHROCYTE [DISTWIDTH] IN BLOOD BY AUTOMATED COUNT: 52.8 FL (ref 35.9–50)
HCT VFR BLD AUTO: 43.6 % (ref 37–47)
HGB BLD-MCNC: 13.9 G/DL (ref 12–16)
MCH RBC QN AUTO: 31.2 PG (ref 27–33)
MCHC RBC AUTO-ENTMCNC: 31.9 G/DL (ref 33.6–35)
MCV RBC AUTO: 98 FL (ref 81.4–97.8)
PLATELET # BLD AUTO: 234 K/UL (ref 164–446)
PMV BLD AUTO: 9.5 FL (ref 9–12.9)
RBC # BLD AUTO: 4.45 M/UL (ref 4.2–5.4)
WBC # BLD AUTO: 10.7 K/UL (ref 4.8–10.8)

## 2018-07-20 PROCEDURE — 36415 COLL VENOUS BLD VENIPUNCTURE: CPT

## 2018-07-20 PROCEDURE — 85027 COMPLETE CBC AUTOMATED: CPT

## 2018-07-20 RX ORDER — TRAMADOL HYDROCHLORIDE 50 MG/1
50 TABLET ORAL DAILY
COMMUNITY
End: 2018-09-07 | Stop reason: SDUPTHER

## 2018-07-20 RX ORDER — UREA 10 %
LOTION (ML) TOPICAL DAILY
COMMUNITY
End: 2019-05-24

## 2018-07-20 NOTE — OR NURSING
Unfortunately there is not a whole lot we can do to make patient compliant with her CPAP regimen. We will evaluate her on the day of surgery and decide to proceed or not at that time. Otherwise no further work up is needed. A recent PCP note would be appreciated if one is available. It is not mandatory. Thank you.  Sandra Fuentes M.D.  [facility]    On Jul 20, 2018, at 15:16, Sisi Ward [email or web address] wrote:   Hi Dr. Fuentes, this pt is having knee scope with Dr. Tran on 7/25.  She is a recent dx with copd, she does have sleep apnea, she wore a cpap for a couple of years, and couldn’t tolerate it.  She stopped using it 2 years ago.  That is mainly why I’m emailing you about her because of her respiratory status?        Anesthesia Summary Report            Patient Name: Kayla Jensen MRN: [id number] Admission Date: Patient not admitted   Allergies: Aspirin, Ibuprofen, Tape

## 2018-07-20 NOTE — OR NURSING
Pre admit apt: Pt. Instructed to continue regularly prescribed medications through day before surgery.  Instructed to take the following medications, the day of surgery, with a sip of water per anesthesia protocol:tylenol 3, gabapentin, ultram, albuterol inhaler

## 2018-07-20 NOTE — OR NURSING
Reviewed patient's medical history with Dr. Fuentes, based upon information available, Dr. Fuentes indicates that the patient is a candidate to have the procedure on 7/25

## 2018-07-25 ENCOUNTER — HOSPITAL ENCOUNTER (OUTPATIENT)
Facility: MEDICAL CENTER | Age: 68
End: 2018-07-25
Attending: ORTHOPAEDIC SURGERY | Admitting: ORTHOPAEDIC SURGERY
Payer: MEDICARE

## 2018-07-25 VITALS
HEART RATE: 74 BPM | DIASTOLIC BLOOD PRESSURE: 87 MMHG | WEIGHT: 166.67 LBS | OXYGEN SATURATION: 95 % | RESPIRATION RATE: 16 BRPM | SYSTOLIC BLOOD PRESSURE: 141 MMHG | HEIGHT: 63 IN | BODY MASS INDEX: 29.53 KG/M2 | TEMPERATURE: 96.8 F

## 2018-07-25 DIAGNOSIS — S83.231A COMPLEX TEAR OF MEDIAL MENISCUS, CURRENT INJURY, RIGHT KNEE, INITIAL ENCOUNTER: ICD-10-CM

## 2018-07-25 PROCEDURE — 160002 HCHG RECOVERY MINUTES (STAT): Performed by: ORTHOPAEDIC SURGERY

## 2018-07-25 PROCEDURE — 160046 HCHG PACU - 1ST 60 MINS PHASE II: Performed by: ORTHOPAEDIC SURGERY

## 2018-07-25 PROCEDURE — 700111 HCHG RX REV CODE 636 W/ 250 OVERRIDE (IP)

## 2018-07-25 PROCEDURE — 160041 HCHG SURGERY MINUTES - EA ADDL 1 MIN LEVEL 4: Performed by: ORTHOPAEDIC SURGERY

## 2018-07-25 PROCEDURE — 160036 HCHG PACU - EA ADDL 30 MINS PHASE I: Performed by: ORTHOPAEDIC SURGERY

## 2018-07-25 PROCEDURE — 700102 HCHG RX REV CODE 250 W/ 637 OVERRIDE(OP)

## 2018-07-25 PROCEDURE — A9270 NON-COVERED ITEM OR SERVICE: HCPCS

## 2018-07-25 PROCEDURE — 700101 HCHG RX REV CODE 250

## 2018-07-25 PROCEDURE — A6222 GAUZE <=16 IN NO W/SAL W/O B: HCPCS | Performed by: ORTHOPAEDIC SURGERY

## 2018-07-25 PROCEDURE — 160035 HCHG PACU - 1ST 60 MINS PHASE I: Performed by: ORTHOPAEDIC SURGERY

## 2018-07-25 PROCEDURE — 160048 HCHG OR STATISTICAL LEVEL 1-5: Performed by: ORTHOPAEDIC SURGERY

## 2018-07-25 PROCEDURE — 160025 RECOVERY II MINUTES (STATS): Performed by: ORTHOPAEDIC SURGERY

## 2018-07-25 PROCEDURE — 501838 HCHG SUTURE GENERAL: Performed by: ORTHOPAEDIC SURGERY

## 2018-07-25 PROCEDURE — 160029 HCHG SURGERY MINUTES - 1ST 30 MINS LEVEL 4: Performed by: ORTHOPAEDIC SURGERY

## 2018-07-25 PROCEDURE — 160009 HCHG ANES TIME/MIN: Performed by: ORTHOPAEDIC SURGERY

## 2018-07-25 RX ORDER — ROPIVACAINE HYDROCHLORIDE 5 MG/ML
INJECTION, SOLUTION EPIDURAL; INFILTRATION; PERINEURAL
Status: DISCONTINUED | OUTPATIENT
Start: 2018-07-25 | End: 2018-07-25 | Stop reason: HOSPADM

## 2018-07-25 RX ORDER — ONDANSETRON 4 MG/1
4 TABLET, FILM COATED ORAL EVERY 4 HOURS PRN
Qty: 8 TAB | Refills: 0 | Status: SHIPPED | OUTPATIENT
Start: 2018-07-25 | End: 2018-07-29

## 2018-07-25 RX ORDER — LIDOCAINE HYDROCHLORIDE 10 MG/ML
INJECTION, SOLUTION EPIDURAL; INFILTRATION; INTRACAUDAL; PERINEURAL
Status: DISCONTINUED
Start: 2018-07-25 | End: 2018-07-25 | Stop reason: HOSPADM

## 2018-07-25 RX ORDER — HYDROCODONE BITARTRATE AND ACETAMINOPHEN 5; 325 MG/1; MG/1
1-2 TABLET ORAL EVERY 6 HOURS PRN
Qty: 20 TAB | Refills: 0 | Status: SHIPPED | OUTPATIENT
Start: 2018-07-25 | End: 2018-07-30

## 2018-07-25 RX ORDER — SODIUM CHLORIDE, SODIUM LACTATE, POTASSIUM CHLORIDE, CALCIUM CHLORIDE 600; 310; 30; 20 MG/100ML; MG/100ML; MG/100ML; MG/100ML
1000 INJECTION, SOLUTION INTRAVENOUS
Status: COMPLETED | OUTPATIENT
Start: 2018-07-25 | End: 2018-07-25

## 2018-07-25 RX ORDER — OXYCODONE HCL 5 MG/5 ML
SOLUTION, ORAL ORAL
Status: COMPLETED
Start: 2018-07-25 | End: 2018-07-25

## 2018-07-25 RX ADMIN — FENTANYL CITRATE 50 MCG: 50 INJECTION, SOLUTION INTRAMUSCULAR; INTRAVENOUS at 14:08

## 2018-07-25 RX ADMIN — OXYCODONE HYDROCHLORIDE 5 MG: 5 SOLUTION ORAL at 13:57

## 2018-07-25 RX ADMIN — FENTANYL CITRATE 50 MCG: 50 INJECTION, SOLUTION INTRAMUSCULAR; INTRAVENOUS at 13:57

## 2018-07-25 RX ADMIN — SODIUM CHLORIDE, SODIUM LACTATE, POTASSIUM CHLORIDE, CALCIUM CHLORIDE 1000 ML: 600; 310; 30; 20 INJECTION, SOLUTION INTRAVENOUS at 11:15

## 2018-07-25 RX ADMIN — OXYCODONE HYDROCHLORIDE 5 MG: 5 SOLUTION ORAL at 14:52

## 2018-07-25 ASSESSMENT — PAIN SCALES - GENERAL
PAINLEVEL_OUTOF10: ASSUMED PAIN PRESENT
PAINLEVEL_OUTOF10: 5
PAINLEVEL_OUTOF10: 8
PAINLEVEL_OUTOF10: 6
PAINLEVEL_OUTOF10: 8
PAINLEVEL_OUTOF10: ASSUMED PAIN PRESENT
PAINLEVEL_OUTOF10: 5
PAINLEVEL_OUTOF10: 4

## 2018-07-25 NOTE — DISCHARGE INSTRUCTIONS
ACTIVITY: Rest and take it easy for the first 24 hours.  A responsible adult is recommended to remain with you during that time.  It is normal to feel sleepy.  We encourage you to not do anything that requires balance, judgment or coordination.    MILD FLU-LIKE SYMPTOMS ARE NORMAL. YOU MAY EXPERIENCE GENERALIZED MUSCLE ACHES, THROAT IRRITATION, HEADACHE AND/OR SOME NAUSEA.    FOR 24 HOURS DO NOT:  Drive, operate machinery or run household appliances.  Drink beer or alcoholic beverages.   Make important decisions or sign legal documents.    SPECIAL INSTRUCTIONS: May remove dressings Post op Day #2 (Friday) and Shower with wound uncovered.  Apply bandaids after shower.  Do not soak or submerge incisions for two weeks. Ice and elevate extremity. Follow up 7-10 days. Weight bear as tolerated.    DIET: To avoid nausea, slowly advance diet as tolerated, avoiding spicy or greasy foods for the first day.  Add more substantial food to your diet according to your physician's instructions.  Babies can be fed formula or breast milk as soon as they are hungry.  INCREASE FLUIDS AND FIBER TO AVOID CONSTIPATION.    FOLLOW-UP APPOINTMENT:  A follow-up appointment should be arranged with your doctor; call to schedule.    You should CALL YOUR PHYSICIAN if you develop:  Fever greater than 101 degrees F.  Pain not relieved by medication, or persistent nausea or vomiting.  Excessive bleeding (blood soaking through dressing) or unexpected drainage from the wound.  Extreme redness or swelling around the incision site, drainage of pus or foul smelling drainage.  Inability to urinate or empty your bladder within 8 hours.  Problems with breathing or chest pain.    You should call 911 if you develop problems with breathing or chest pain.  If you are unable to contact your doctor or surgical center, you should go to the nearest emergency room or urgent care center.  Physician's telephone #: [phone]    If any questions arise, call your  doctor.  If your doctor is not available, please feel free to call the Surgical Center at [phone].  The Center is open Monday through Friday from 7AM to 7PM.  You can also call the HEALTH HOTLINE open 24 hours/day, 7 days/week and speak to a nurse at [phone], or toll free at [phone].    A registered nurse may call you a few days after your surgery to see how you are doing after your procedure.    MEDICATIONS: Resume taking daily medication.  Take prescribed pain medication with food.  If no medication is prescribed, you may take non-aspirin pain medication if needed.  PAIN MEDICATION CAN BE VERY CONSTIPATING.  Take a stool softener or laxative such as senokot, pericolace, or milk of magnesia if needed.    Prescription given for Norco.  Last pain medication given at 1:57 p.m..    If your physician has prescribed pain medication that includes Acetaminophen (Tylenol), do not take additional Acetaminophen (Tylenol) while taking the prescribed medication.    Depression / Suicide Risk    As you are discharged from this [facility] facility, it is important to learn how to keep safe from harming yourself.    Recognize the warning signs:  · Abrupt changes in personality, positive or negative- including increase in energy   · Giving away possessions  · Change in eating patterns- significant weight changes-  positive or negative  · Change in sleeping patterns- unable to sleep or sleeping all the time   · Unwillingness or inability to communicate  · Depression  · Unusual sadness, discouragement and loneliness  · Talk of wanting to die  · Neglect of personal appearance   · Rebelliousness- reckless behavior  · Withdrawal from people/activities they love  · Confusion- inability to concentrate     If you or a loved one observes any of these behaviors or has concerns about self-harm, here's what you can do:  · Talk about it- your feelings and reasons for harming yourself  · Remove any means that you might use  to hurt yourself (examples: pills, rope, extension cords, firearm)  · Get professional help from the community (Mental Health, Substance Abuse, psychological counseling)  · Do not be alone:Call your Safe Contact- someone whom you trust who will be there for you.  · Call your local CRISIS HOTLINE 718-7976 or 510-717-0224  · Call your local Children's Mobile Crisis Response Team [place] (596) 994-0397 or www.[employer]  · Call the toll free National Suicide Prevention Hotlines   · National Suicide Prevention Lifeline 789-952-UFZG (6282)  [facility] Line Network 800-SUICIDE (701-2001)    Discharge Education for patients on BERNARD (Obstructive Sleep Apnea) Protocol    Prior to receiving sedation or anesthesia, we screen all patients for Obstructive Sleep Apnea.  During your screening, you were identified as having Obstructive Sleep Apnea(BERNARD).    What is Obstructive Sleep Apnea?  Sleep apnea (AP-ne-ah) is a common disorder which involves breathing pauses that occur during sleep.  These can last from 10 seconds to a minute or longer.  Normal breathing resumes often with a loud snort or choking sound.    Sleep apnea occurs in all age groups and both genders but is more common in men and people over 40 years of age.  It has been estimated that as many as 18 million Americans have sleep apnea.  Most people who have sleep apnea don’t know they have it because it only occurs during sleep.  A family member and/or bed partner may first notice the signs of sleep apnea.  Sleep apnea is a chronic (ongoing) condition that disrupts the quality and quantity of your sleep repeatedly throughout the night.  This often results in excessive daytime sleepiness or fatigue during the day.  It may also contribute to high blood pressure, heart problems, and complications following medications used for surgery and procedures.      We recommend that you should be with an adult observer for at least 24 hours after your  sedation/anesthesia.  If you have a CPAP machine, you should wear it during any sleep period (day or night) for the week following your procedure.  We encourage you to sleep on your side or in a sitting position, even with napping.  Lying flat on your back increases the risk of apnea and airway obstruction during your post procedure recovery period.    It is important to prevent over-sedation that could increase your risk for apnea.  Please take all pain medication as directed by your physician.  If you are not getting pain relief, please contact your physician to discuss possible approaches to relieving pain while minimizing medications that can affect your breathing and oxygen levels.      ·

## 2018-07-25 NOTE — OP REPORT
DATE OF SERVICE:  07/25/2018    SURGEON:  Goldy Tran MD    ASSISTANT:  Jeremiah Terrazas PA-C    PREOPERATIVE DIAGNOSES:  1.  Right knee medial meniscus tear.  2.  Right knee parameniscal cyst.  3.  Right knee chondromalacia.    POSTOPERATIVE DIAGNOSES:  1.  Right knee medial meniscus tear.  2.  Right knee parameniscal cyst.  3.  Right knee chondromalacia.  4.  Right knee lateral meniscus tear.    PROCEDURE:  Right knee arthroscopy with partial medial meniscectomy, lateral   meniscectomy, chondroplasty.    ANESTHESIOLOGIST:  Jayjay Gaston MD    ANESTHETIC:  LMA anesthesia along with local injection.    INDICATIONS:  Patient has had right knee pain for quite some time and has   failed nonoperative treatment, elected to proceed with operative intervention.    She was explained the risks, benefits, alternative procedure and recovery in   detail.  All questions were answered, informed consent was obtained.  Site   verification per protocol was done in the preop holding area in the operating   room.  Patient received appropriate preoperative antibiotics.    OPERATION:  After successful anesthesia, patient's right knee was examined.    She had good range of motion, no evidence of instability.  The leg was then   prepped and draped in the usual sterile fashion.  Anterolateral portal was   established with knife and blunt trocar in the suprapatellar pouch.  Medial   and lateral gutters were free of abnormalities.  The patellofemoral joint had   some mild chondromalacia.  This was gently smoothed out with a shaver from   multiple portals.  The medial joint had a large flap tear of the medial   meniscus.  This was split all the way to the capsule and the mid body.  This   was debrided back with zonia and baskets from multiple portals.  There was a   little bit of synovial fluid that came out of cyst.  We were able to   decompress it internally.  I did this for multiple portals to stabilize the   meniscus.  She also had  some grade III chondromalacia with some large flaps   and chondroplasty was done on the medial femoral condyle.  The tibial plateau   was overall in pretty good condition.  At that point, I probed it, was happy   with the procedure for the medial joint side.  Then, the notch showed normal   ACL and PCL.  The lateral joint had some central fraying of lateral meniscus   and root, which was debrided back with zonia and baskets to a stable margin.    I probed it, felt it was stable, the articular cartilage and popliteus   laterally were in good condition.  At that point, I was happy with the   procedure.  I lavaged out the joint, suctioned the fluid, closed the portals   with 3-0 Prolene in interrupted fashion.  Xeroform, 4x4, and an Ace wrap was   applied.    ESTIMATED BLOOD LOSS:  Minimal.    COMPLICATIONS:  None.    Jeremiah Terrazas PA-C, was medically necessary for the case.  He helped with   instrumentation, retraction, and positioning.  Without his help, the case   would not have been as technically successful.       ____________________________________     MD CARIDAD CROSS / CARYN    DD:  07/25/2018 14:05:22  DT:  07/25/2018 14:16:32    D#:  [id number]  Job#:  [id number]

## 2018-07-25 NOTE — OR NURSING
"1515 patient to stage 2  Patient settled in recliner chair post short ambulation from [facility] -  dressed with assist by CNA. +2 R pedal pulse, pink/warm toes, CMS intact to RLE and demonstrated ability to dorsiflex R foot. Dressing CDI to R knee. Pain rated as \"much better now\" and tolerable. Denies nausea.   1544 D/Harley to care of family post uneventful stay in PACU 2.    "

## 2018-07-25 NOTE — OR NURSING
1350: To PACU from OR via gurney, respirations spontaneous and non-laboredIcepack applied over c/d/i R knee surgical dressings.  1400: Report given pt MARIANO Lyons and she assumed care, pain is increasing, pain medication given per MAR, no nausea.

## 2018-07-25 NOTE — OR NURSING
"1400: Care assumed of awake pt c/o surgical pain, denies nausea, medicated post tolerating po water.  1405: pain persists, plan further Fentanyl. Plan to keep pt in PACU for full hour per [facility] protocol.  1420: Pain decreasing, pt aware that po analgesia has not had time to peak as yet.  1435: pain decreasing  1450: Pain not yet tolerable, plan second dose po analgesia   1505: Pt states pain now \"much better\" and \"just a little sore\", verbalizes readiness for Stage 2. No change in surgical site assessment.  "

## 2018-08-20 ENCOUNTER — PATIENT OUTREACH (OUTPATIENT)
Dept: OTHER | Facility: MEDICAL CENTER | Age: 68
End: 2018-08-20

## 2018-09-07 ENCOUNTER — OFFICE VISIT (OUTPATIENT)
Dept: MEDICAL GROUP | Facility: PHYSICIAN GROUP | Age: 68
End: 2018-09-07
Payer: MEDICARE

## 2018-09-07 VITALS
WEIGHT: 170 LBS | TEMPERATURE: 97.6 F | HEART RATE: 84 BPM | BODY MASS INDEX: 30.12 KG/M2 | DIASTOLIC BLOOD PRESSURE: 86 MMHG | SYSTOLIC BLOOD PRESSURE: 144 MMHG | HEIGHT: 63 IN | OXYGEN SATURATION: 96 %

## 2018-09-07 DIAGNOSIS — M54.42 CHRONIC LEFT-SIDED LOW BACK PAIN WITH LEFT-SIDED SCIATICA: ICD-10-CM

## 2018-09-07 DIAGNOSIS — Z79.891 CHRONIC USE OF OPIATE DRUGS THERAPEUTIC PURPOSES: ICD-10-CM

## 2018-09-07 DIAGNOSIS — S83.231A COMPLEX TEAR OF MEDIAL MENISCUS, CURRENT INJURY, RIGHT KNEE, INITIAL ENCOUNTER: ICD-10-CM

## 2018-09-07 DIAGNOSIS — Z00.00 ROUTINE HEALTH MAINTENANCE: ICD-10-CM

## 2018-09-07 DIAGNOSIS — E78.00 PURE HYPERCHOLESTEROLEMIA: ICD-10-CM

## 2018-09-07 DIAGNOSIS — G89.29 CHRONIC LEFT-SIDED LOW BACK PAIN WITH LEFT-SIDED SCIATICA: ICD-10-CM

## 2018-09-07 DIAGNOSIS — M17.11 PRIMARY OSTEOARTHRITIS OF RIGHT KNEE: ICD-10-CM

## 2018-09-07 DIAGNOSIS — Z98.890 S/P RIGHT KNEE SURGERY: ICD-10-CM

## 2018-09-07 PROCEDURE — 99214 OFFICE O/P EST MOD 30 MIN: CPT | Performed by: NURSE PRACTITIONER

## 2018-09-07 RX ORDER — TRAMADOL HYDROCHLORIDE 50 MG/1
50 TABLET ORAL
Qty: 30 TAB | Refills: 0 | Status: SHIPPED | OUTPATIENT
Start: 2018-09-07 | End: 2018-09-07 | Stop reason: SDUPTHER

## 2018-09-07 RX ORDER — TRAMADOL HYDROCHLORIDE 50 MG/1
50-100 TABLET ORAL
Qty: 60 TAB | Refills: 0 | Status: SHIPPED | OUTPATIENT
Start: 2018-09-07 | End: 2018-09-07 | Stop reason: SDUPTHER

## 2018-09-07 RX ORDER — TRAMADOL HYDROCHLORIDE 50 MG/1
50-100 TABLET ORAL
Qty: 60 TAB | Refills: 0 | Status: SHIPPED | OUTPATIENT
Start: 2018-10-07 | End: 2018-11-06

## 2018-09-07 RX ORDER — TRAMADOL HYDROCHLORIDE 50 MG/1
50 TABLET ORAL
Qty: 30 TAB | Refills: 0 | Status: SHIPPED | OUTPATIENT
Start: 2018-10-07 | End: 2018-09-07 | Stop reason: SDUPTHER

## 2018-09-07 RX ORDER — ACETAMINOPHEN AND CODEINE PHOSPHATE 300; 30 MG/1; MG/1
1-2 TABLET ORAL
Qty: 60 TAB | Refills: 0 | Status: SHIPPED | OUTPATIENT
Start: 2018-11-06 | End: 2018-11-30 | Stop reason: SDUPTHER

## 2018-09-07 RX ORDER — TRAMADOL HYDROCHLORIDE 50 MG/1
50-100 TABLET ORAL
Qty: 60 TAB | Refills: 0 | Status: SHIPPED | OUTPATIENT
Start: 2018-11-06 | End: 2018-09-07 | Stop reason: SDUPTHER

## 2018-09-07 RX ORDER — GABAPENTIN 300 MG/1
900 CAPSULE ORAL 2 TIMES DAILY
Qty: 540 CAP | Refills: 3 | Status: SHIPPED | OUTPATIENT
Start: 2018-09-07 | End: 2019-09-01 | Stop reason: SDUPTHER

## 2018-09-07 RX ORDER — ACETAMINOPHEN AND CODEINE PHOSPHATE 300; 30 MG/1; MG/1
1-2 TABLET ORAL
Qty: 60 TAB | Refills: 0 | Status: SHIPPED | OUTPATIENT
Start: 2018-10-07 | End: 2018-09-07 | Stop reason: SDUPTHER

## 2018-09-07 RX ORDER — ACETAMINOPHEN AND CODEINE PHOSPHATE 300; 30 MG/1; MG/1
1-2 TABLET ORAL
Qty: 60 TAB | Refills: 0 | Status: SHIPPED | OUTPATIENT
Start: 2018-09-07 | End: 2018-09-07 | Stop reason: SDUPTHER

## 2018-09-07 RX ORDER — TRAMADOL HYDROCHLORIDE 50 MG/1
50 TABLET ORAL
Qty: 30 TAB | Refills: 0 | Status: SHIPPED | OUTPATIENT
Start: 2018-11-06 | End: 2018-09-07 | Stop reason: SDUPTHER

## 2018-09-07 ASSESSMENT — PAIN SCALES - GENERAL: PAINLEVEL: 3=SLIGHT PAIN

## 2018-09-07 NOTE — ASSESSMENT & PLAN NOTE
Ongoing problem that has just had repair 9 weeks ago with surgery. She continues to have pain but it is improving and she is going to be going back to work next week. Since she has been resting, she has been using less tramadol and Tylenol No. 3, but or so has discussed with her to be prepared for increased use of pain medication when she returns to work and is on her feet more.

## 2018-09-07 NOTE — ASSESSMENT & PLAN NOTE
"Last dose of narcotic medication: yesterday   Analgesia\" 3/10  Activity level:   Adverse events: none  Aberrant behavior: none  Affect and mood: calm and pleasant  Nonnarcotic treatments that are being used: gabapentin, epidurals, surgery   Pain management agreement initiated and signed on: 5/14/18  Most recent urine drug screen done 5/2018, and is consistent with prescribed medications  Opiate risk score: 0  Total daily opiate dosage: morphine 9.5 mEq daily         "

## 2018-09-07 NOTE — ASSESSMENT & PLAN NOTE
Ongoing chronic problem with pain in lower back radiating in left leg and foot. The pain is worsening over time. Treated with gabapentin, Tramadol in am, Tylenol #3 in pm only (because it causes drowsiness), lidocaine patches, and procedural management with Dr. Araiza. She just had epidural in May

## 2018-09-07 NOTE — ASSESSMENT & PLAN NOTE
She has gained 5 pounds over the past 2 months but she did have surgery and was doing much less physical activity. Diet is very healthy and poultry and vegetables. She is motivated to get back on her feet and lose weight again

## 2018-09-07 NOTE — PROGRESS NOTES
"Subjective:     Chief Complaint   Patient presents with   • Medication Management       HPI  Kayla Jensen is a 67 y.o. female here today for routine f/u     Primary osteoarthritis of right knee  Ongoing problem that has just had repair 9 weeks ago with surgery. She continues to have pain but it is improving and she is going to be going back to work next week. Since she has been resting, she has been using less tramadol and Tylenol No. 3, but or so has discussed with her to be prepared for increased use of pain medication when she returns to work and is on her feet more.    Complex tear of medial meniscus, current injury, right knee, initial encounter  Patient has had complex tear of meniscus that was just repaired by surgery 9 weeks ago    Chronic use of opiate drugs therapeutic purposes  Last dose of narcotic medication: yesterday   Analgesia\" 3/10  Activity level:   Adverse events: none  Aberrant behavior: none  Affect and mood: calm and pleasant  Nonnarcotic treatments that are being used: gabapentin, epidurals, surgery   Pain management agreement initiated and signed on: 5/14/18  Most recent urine drug screen done 5/2018, and is consistent with prescribed medications  Opiate risk score: 0  Total daily opiate dosage: morphine 9.5 mEq daily           Chronic left-sided low back pain with left-sided sciatica  Ongoing chronic problem with pain in lower back radiating in left leg and foot. The pain is worsening over time. Treated with gabapentin, Tramadol in am, Tylenol #3 in pm only (because it causes drowsiness), lidocaine patches, and procedural management with Dr. Araiza. She just had epidural in May    BMI 30.0-30.9,adult  She has gained 5 pounds over the past 2 months but she did have surgery and was doing much less physical activity. Diet is very healthy and poultry and vegetables. She is motivated to get back on her feet and lose weight again    Pure hypercholesterolemia  Has history of dyslipidemia " very minimal not requiring statin therapy for treatment. Due for annual labs for monitoring       Diagnoses of Pure hypercholesterolemia, Primary osteoarthritis of right knee, Complex tear of medial meniscus, current injury, right knee, initial encounter, Chronic left-sided low back pain with left-sided sciatica, S/P right knee surgery, Chronic use of opiate drugs therapeutic purposes, Routine health maintenance, and BMI 30.0-30.9,adult were pertinent to this visit.    Allergies: Aspirin; Ibuprofen; and Tape  Current medicines (including changes today)  Current Outpatient Prescriptions   Medication Sig Dispense Refill   • [START ON 11/6/2018] Acetaminophen-Codeine (TYLENOL/CODEINE #3) 300-30 MG Tab Take 1-2 Tabs by mouth 1 time daily as needed (severe pain at bedtime only) for up to 30 days. 60 Tab 0   • [START ON 10/7/2018] tramadol (ULTRAM) 50 MG Tab Take 1-2 Tabs by mouth 1 time daily as needed for Severe Pain for up to 30 days. 60 Tab 0   • gabapentin (NEURONTIN) 300 MG Cap Take 3 Caps by mouth 2 Times a Day. 540 Cap 3   • fexofenadine-pseudoephedrine (ALLEGRA-D)  MG per tablet TAKE 1 TABLET BY MOUTH 2 TIMES A DAY. 60 Tab 3   • eucalyptus/peppermint oil (PONARIS NASAL) Solution Spray 10 Drops in nose every 6 hours as needed. 1 Bottle 2   • albuterol 108 (90 BASE) MCG/ACT Aero Soln inhalation aerosol Inhale 2 Puffs by mouth every 6 hours as needed for Shortness of Breath. 8.5 g 3   • Melatonin 1 MG Tab Take  by mouth every day.     • guaiFENesin LA (MUCINEX) 600 MG TABLET SR 12 HR Take 600 mg by mouth as needed.     • lidocaine (LIDODERM) 5 % Patch Apply 1 Patch to skin as directed every 24 hours. 30 Patch 3     No current facility-administered medications for this visit.        She  has a past medical history of Allergy; Anesthesia (07/2018); Arthritis (07/2018); Breath shortness (07/2018); Cataract; Dental disorder; Emphysema of lung (HCC) (07/2018); GERD (gastroesophageal reflux disease); Heart burn;  "Personal history of venous thrombosis and embolism (1982); Pneumonia; Renal disorder; Sleep apnea (07/2018); and Unspecified hemorrhagic conditions. She also has no past medical history of Backpain.    Health Maintenance: Educated on shingles vaccination    ROS  As stated in HPI and additionally  Gen: +weight gain and fatigue. No F/C  Pulm: No sob, dyspnea, cough, hemoptysis  CV: No chest pain or LE edema  MSK: No joint edema, erythema, warmth        Objective:     Blood pressure 144/86, pulse 84, temperature 36.4 °C (97.6 °F), height 1.6 m (5' 3\"), weight 77.1 kg (170 lb), last menstrual period 09/25/2007, SpO2 96 %. Body mass index is 30.11 kg/m².  Physical Exam:  General: Alert, oriented, in no acute distress.  Eye contact is good, speech goal directed, affect calm  CNs grossly intact.  HEENT: conjunctiva non-injected, sclera non-icteric, EOMs intact. No lid edema or eye drainage.   Gross hearing intact.  Lungs: unlabored. clear to auscultation bilaterally with good excursion.  CV: regular rate and rhythm. No murmurs.    Ext: no edema, normal color and temperature.   MSK: Right knee with healed incisions. No edema, erythema, or warmth. Able to bear full weight.   Skin: No rashes or lesions in visible areas  Gait steady.     Assessment and Plan:   Assessment/Plan:  1. Pure hypercholesterolemia  Stable without treatment. Managing through diet and activity. Lipids due now for monitoring  - LIPID PROFILE; Future    2. Primary osteoarthritis of right knee  This problem is resolving as she has had recent surgery. Continue current pain management and re-eval in 3 months  - Acetaminophen-Codeine (TYLENOL/CODEINE #3) 300-30 MG Tab; Take 1-2 Tabs by mouth 1 time daily as needed (severe pain at bedtime only) for up to 30 days.  Dispense: 60 Tab; Refill: 0  - tramadol (ULTRAM) 50 MG Tab; Take 1-2 Tabs by mouth 1 time daily as needed for Severe Pain for up to 30 days.  Dispense: 60 Tab; Refill: 0    3. Complex tear of medial " meniscus, current injury, right knee, initial encounter  This problem is resolving as she has had recent surgery. Continue current pain management and re-eval in 3 months  - Acetaminophen-Codeine (TYLENOL/CODEINE #3) 300-30 MG Tab; Take 1-2 Tabs by mouth 1 time daily as needed (severe pain at bedtime only) for up to 30 days.  Dispense: 60 Tab; Refill: 0  - tramadol (ULTRAM) 50 MG Tab; Take 1-2 Tabs by mouth 1 time daily as needed for Severe Pain for up to 30 days.  Dispense: 60 Tab; Refill: 0    4. Chronic left-sided low back pain with left-sided sciatica  Chronic stable problem continue current pain management plan and follow-up with neurosurgery and pain management  - Acetaminophen-Codeine (TYLENOL/CODEINE #3) 300-30 MG Tab; Take 1-2 Tabs by mouth 1 time daily as needed (severe pain at bedtime only) for up to 30 days.  Dispense: 60 Tab; Refill: 0  - tramadol (ULTRAM) 50 MG Tab; Take 1-2 Tabs by mouth 1 time daily as needed for Severe Pain for up to 30 days.  Dispense: 60 Tab; Refill: 0  - gabapentin (NEURONTIN) 300 MG Cap; Take 3 Caps by mouth 2 Times a Day.  Dispense: 540 Cap; Refill: 3    5. S/P right knee surgery  Doing well after surgery. Continue follow-up with orthopedic.    6. Chronic use of opiate drugs therapeutic purposes  Obtained and reviewed the patient utilization report state pharmacy database on 9/7/2018.  Based on assessment of report prescription for Tramadol and T#3 is medically necessary. Patient has not requested any early refills and exhibits no abberant behavior. I have advised patient to keep medication and safe place and to not drive, use alcohol, or take illicit drugs while taking this medication.    7. Routine health maintenance  - CBC WITH DIFFERENTIAL; Future  - COMP METABOLIC PANEL; Future    8. BMI 30.0-30.9,adult  - Patient identified as having weight management issue.  Appropriate orders and counseling given.       Follow up:  Return in about 3 months (around  12/7/2018).    Educated in proper administration of medication(s) ordered today including safety, possible SE, risks, benefits, rationale and alternatives to therapy.   Supportive care, differential diagnoses, and indications for immediate follow-up discussed with patient.    Pathogenesis of diagnosis discussed including typical length and natural progression.    Instructed to return to clinic or nearest emergency department for any change in condition, further concerns, or worsening of symptoms.  Patient states understanding of the plan of care and discharge instructions.      Please note that this dictation was created using voice recognition software. I have made every reasonable attempt to correct obvious errors, but I expect that there are errors of grammar and possibly content that I did not discover before finalizing the note.    Followup: Return in about 3 months (around 12/7/2018). sooner should new symptoms or problems arise.

## 2018-09-07 NOTE — ASSESSMENT & PLAN NOTE
Has history of dyslipidemia very minimal not requiring statin therapy for treatment. Due for annual labs for monitoring

## 2018-09-25 ENCOUNTER — OFFICE VISIT (OUTPATIENT)
Dept: DERMATOLOGY | Facility: IMAGING CENTER | Age: 68
End: 2018-09-25
Payer: MEDICARE

## 2018-09-25 ENCOUNTER — HOSPITAL ENCOUNTER (OUTPATIENT)
Facility: MEDICAL CENTER | Age: 68
End: 2018-09-25
Attending: NURSE PRACTITIONER
Payer: MEDICARE

## 2018-09-25 VITALS
HEIGHT: 65 IN | DIASTOLIC BLOOD PRESSURE: 70 MMHG | HEART RATE: 65 BPM | SYSTOLIC BLOOD PRESSURE: 130 MMHG | WEIGHT: 165 LBS | TEMPERATURE: 98.3 F | BODY MASS INDEX: 27.49 KG/M2

## 2018-09-25 DIAGNOSIS — D48.5 NEOPLASM OF UNCERTAIN BEHAVIOR OF SKIN OF CHEEK: ICD-10-CM

## 2018-09-25 DIAGNOSIS — L98.9 SKIN LESION OF RIGHT LOWER EXTREMITY: ICD-10-CM

## 2018-09-25 PROCEDURE — 99212 OFFICE O/P EST SF 10 MIN: CPT | Mod: 25 | Performed by: NURSE PRACTITIONER

## 2018-09-25 PROCEDURE — 11100 PR BIOPSY OF SKIN LESION: CPT | Performed by: NURSE PRACTITIONER

## 2018-09-25 PROCEDURE — 88305 TISSUE EXAM BY PATHOLOGIST: CPT

## 2018-09-25 NOTE — PROGRESS NOTES
"Chief Complaint   Patient presents with   • Skin Lesion             HISTORY OF THE PRESENT ILLNESS: Patient is a 67 y.o. female.  This is here today regarding 2 skin lesions.  She has no personal or family history of skin cancer.  She is a former smoker.  She does have a allergy to tape.  She has no problem with paper tape.  She is not taking any blood thinner medication she has taken it in the past after a knee surgery.    Neoplasm of uncertain behavior of skin of cheek  She has noticed a lesion on her chest for about a year that has not healed for about one year. It is occasionally painful.     Skin lesion of right lower extremity  She has noticed a lesion on her right lower leg that has not healed completely present for one month.       Allergies: Aspirin; Ibuprofen; and Tape                  Review of Systems   Constitutional: Negative for fever, chills, weight loss and malaise/fatigue.   Skin: Skin lesion right chest and right lower extremity    Exam: Blood pressure 130/70, pulse 65, temperature 36.8 °C (98.3 °F), height 1.651 m (5' 5\"), weight 74.8 kg (165 lb), last menstrual period 09/25/2007, not currently breastfeeding.  General: Normal appearing. No distress.  Skin: Right mid chest a slightly erythematous rough 1 cm lesion patient consents to biopsy of this lesion  Right lower extremity on the mid calf and excoriated open lesion 5 mm in diameter patient prefers to wait for a follow-up visit for biopsy of this lesion to wait to see if this lesion will heal    Consent is signed     Preoperative diagnosis: Neoplasm of uncertain behavior of the skin of the chest   Postoperative diagnosis: Neoplasm of uncertain behavior of the skin of the chest             Anesthesia: Half to 1 cc of lidocaine with epinephrine    EBL: Less than 2 cc    Complications: None    Procedure: After informed consent patient was placed on the table supine. An area 2 cm superior lateral to the skin neoplasm was swabbed with iodine. Using " a 30 gauge 1/2 inch needle local anesthesia was obtained with half cc of lidocaine with epinephrine.  Using a punch apunch biopsy was performed, lesion wassent to pathology. A #5 Prolene suture was placed.   Patient tolerated the procedure well . Antibacterial ointment and a sterile dressing was applied, patient was instructed on wound care. Patient was instructed on signs and symptoms of infection. She will call the office if these occur.    Please note that this dictation was created using voice recognition software. I have made every reasonable attempt to correct obvious errors, but I expect that there are errors of grammar and possibly content that I did not discover before finalizing the note.      Assessment/Plan  1. Neoplasm of uncertain behavior of skin of cheek   further treatment based on pathology results   2. Skin lesion of right lower extremity   patient will return for complete skin check   Patient is encouraged to use sunscreen daily she will follow-up for complete skin check

## 2018-09-25 NOTE — ASSESSMENT & PLAN NOTE
She has noticed a lesion on her chest for about a year that has not healed for about one year. It is occasionally painful.

## 2018-09-25 NOTE — ASSESSMENT & PLAN NOTE
She has noticed a lesion on her right lower leg that has not healed completely present for one month.

## 2018-10-01 ENCOUNTER — APPOINTMENT (OUTPATIENT)
Dept: DERMATOLOGY | Facility: IMAGING CENTER | Age: 68
End: 2018-10-01
Payer: MEDICARE

## 2018-10-08 ENCOUNTER — OFFICE VISIT (OUTPATIENT)
Dept: URGENT CARE | Facility: PHYSICIAN GROUP | Age: 68
End: 2018-10-08
Payer: MEDICARE

## 2018-10-08 VITALS
SYSTOLIC BLOOD PRESSURE: 110 MMHG | RESPIRATION RATE: 16 BRPM | TEMPERATURE: 97.5 F | DIASTOLIC BLOOD PRESSURE: 74 MMHG | WEIGHT: 165 LBS | HEIGHT: 63 IN | BODY MASS INDEX: 29.23 KG/M2 | OXYGEN SATURATION: 95 % | HEART RATE: 85 BPM

## 2018-10-08 DIAGNOSIS — J43.8 OTHER EMPHYSEMA (HCC): ICD-10-CM

## 2018-10-08 DIAGNOSIS — Z87.891 FORMER SMOKER: ICD-10-CM

## 2018-10-08 DIAGNOSIS — J20.9 ACUTE BRONCHITIS, UNSPECIFIED ORGANISM: ICD-10-CM

## 2018-10-08 PROCEDURE — 99214 OFFICE O/P EST MOD 30 MIN: CPT | Performed by: NURSE PRACTITIONER

## 2018-10-08 RX ORDER — DOXYCYCLINE HYCLATE 100 MG
100 TABLET ORAL 2 TIMES DAILY
Qty: 14 TAB | Refills: 0 | Status: SHIPPED | OUTPATIENT
Start: 2018-10-08 | End: 2018-11-12

## 2018-10-08 ASSESSMENT — ENCOUNTER SYMPTOMS
ORTHOPNEA: 0
MYALGIAS: 0
SPUTUM PRODUCTION: 1
NAUSEA: 0
COUGH: 1
WHEEZING: 0
SHORTNESS OF BREATH: 0
HEADACHES: 0
EYE DISCHARGE: 0
CHILLS: 0
SORE THROAT: 1
FEVER: 0
DIARRHEA: 0

## 2018-10-08 NOTE — PROGRESS NOTES
Subjective:      Kayla Jensen is a 67 y.o. female who presents with Pharyngitis (cough, nasal congestion, x 1 day)            HPI New problem. 67 year old female presenting with nasal congestion, sore throat and cough for one day. She states she is coughing up green/yellow. Denies fever, chills, nausea or diarrhea. She has started mucinex D. History relevant for COPD, former smoker.  Aspirin; Ibuprofen; and Tape  Current Outpatient Prescriptions on File Prior to Visit   Medication Sig Dispense Refill   • [START ON 11/6/2018] Acetaminophen-Codeine (TYLENOL/CODEINE #3) 300-30 MG Tab Take 1-2 Tabs by mouth 1 time daily as needed (severe pain at bedtime only) for up to 30 days. 60 Tab 0   • tramadol (ULTRAM) 50 MG Tab Take 1-2 Tabs by mouth 1 time daily as needed for Severe Pain for up to 30 days. 60 Tab 0   • gabapentin (NEURONTIN) 300 MG Cap Take 3 Caps by mouth 2 Times a Day. 540 Cap 3   • guaiFENesin LA (MUCINEX) 600 MG TABLET SR 12 HR Take 600 mg by mouth as needed.     • Melatonin 1 MG Tab Take  by mouth every day.     • fexofenadine-pseudoephedrine (ALLEGRA-D)  MG per tablet TAKE 1 TABLET BY MOUTH 2 TIMES A DAY. 60 Tab 3   • eucalyptus/peppermint oil (PONARIS NASAL) Solution Spray 10 Drops in nose every 6 hours as needed. 1 Bottle 2   • lidocaine (LIDODERM) 5 % Patch Apply 1 Patch to skin as directed every 24 hours. 30 Patch 3   • albuterol 108 (90 BASE) MCG/ACT Aero Soln inhalation aerosol Inhale 2 Puffs by mouth every 6 hours as needed for Shortness of Breath. 8.5 g 3     No current facility-administered medications on file prior to visit.      Social History     Social History   • Marital status:      Spouse name: N/A   • Number of children: N/A   • Years of education: N/A     Occupational History   • Not on file.     Social History Main Topics   • Smoking status: Former Smoker     Packs/day: 1.50     Years: 40.00     Types: Cigarettes     Quit date: 3/1/2002   • Smokeless tobacco: Never  "Used   • Alcohol use 0.0 oz/week      Comment: 1 per day   • Drug use: No   • Sexual activity: Not Currently     Partners: Male     Other Topics Concern   • Not on file     Social History Narrative    Patient is  and is still working. She recently moved to [place] a few years ago after living in [place] for most of her life.      family history includes Cancer in her brother; Dementia in her mother; Heart Disease in her mother.      Review of Systems   Constitutional: Positive for malaise/fatigue. Negative for chills and fever.   HENT: Positive for congestion and sore throat.    Eyes: Negative for discharge.   Respiratory: Positive for cough and sputum production. Negative for shortness of breath and wheezing.    Cardiovascular: Negative for chest pain and orthopnea.   Gastrointestinal: Negative for diarrhea and nausea.   Musculoskeletal: Negative for myalgias.   Neurological: Negative for headaches.   Endo/Heme/Allergies: Negative for environmental allergies.          Objective:     /74 (BP Location: Left arm, Patient Position: Sitting, BP Cuff Size: Adult)   Pulse 85   Temp 36.4 °C (97.5 °F) (Temporal)   Resp 16   Ht 1.6 m (5' 3\")   Wt 74.8 kg (165 lb)   LMP 09/25/2007   SpO2 95%   BMI 29.23 kg/m²      Physical Exam   Constitutional: She is oriented to person, place, and time. She appears well-developed and well-nourished. No distress.   HENT:   Head: Normocephalic and atraumatic.   Right Ear: External ear and ear canal normal. Tympanic membrane is not injected and not perforated. No middle ear effusion.   Left Ear: External ear and ear canal normal. Tympanic membrane is not injected and not perforated.  No middle ear effusion.   Nose: Mucosal edema present.   Mouth/Throat: Posterior oropharyngeal erythema present. No oropharyngeal exudate.   Eyes: Conjunctivae are normal. Right eye exhibits no discharge. Left eye exhibits no discharge.   Neck: Normal range of motion. Neck supple. "   Cardiovascular: Normal rate, regular rhythm and normal heart sounds.    No murmur heard.  Pulmonary/Chest: Effort normal and breath sounds normal. No respiratory distress.   Musculoskeletal: Normal range of motion.   Normal movement of all 4 extremities.   Lymphadenopathy:     She has no cervical adenopathy.        Right: No supraclavicular adenopathy present.        Left: No supraclavicular adenopathy present.   Neurological: She is alert and oriented to person, place, and time. Gait normal.   Skin: Skin is warm and dry.   Psychiatric: She has a normal mood and affect. Her behavior is normal. Thought content normal.   Nursing note and vitals reviewed.              Assessment/Plan:     1. Acute bronchitis, unspecified organism  doxycycline (VIBRAMYCIN) 100 MG Tab   2. Other emphysema (HCC)     3. Former smoker       Coverage with doxy due to her lung disease.  She may continue otc medications.  Differential diagnosis, natural history, supportive care, and indications for immediate follow-up discussed at length.

## 2018-10-27 ENCOUNTER — HOSPITAL ENCOUNTER (OUTPATIENT)
Dept: LAB | Facility: MEDICAL CENTER | Age: 68
End: 2018-10-27
Attending: NURSE PRACTITIONER
Payer: MEDICARE

## 2018-10-27 DIAGNOSIS — Z00.00 ROUTINE HEALTH MAINTENANCE: ICD-10-CM

## 2018-10-27 DIAGNOSIS — E78.00 PURE HYPERCHOLESTEROLEMIA: ICD-10-CM

## 2018-10-27 LAB
ALBUMIN SERPL BCP-MCNC: 4.2 G/DL (ref 3.2–4.9)
ALBUMIN/GLOB SERPL: 1.4 G/DL
ALP SERPL-CCNC: 137 U/L (ref 30–99)
ALT SERPL-CCNC: 18 U/L (ref 2–50)
ANION GAP SERPL CALC-SCNC: 9 MMOL/L (ref 0–11.9)
AST SERPL-CCNC: 18 U/L (ref 12–45)
BASOPHILS # BLD AUTO: 0.8 % (ref 0–1.8)
BASOPHILS # BLD: 0.06 K/UL (ref 0–0.12)
BILIRUB SERPL-MCNC: 0.5 MG/DL (ref 0.1–1.5)
BUN SERPL-MCNC: 22 MG/DL (ref 8–22)
CALCIUM SERPL-MCNC: 9.6 MG/DL (ref 8.5–10.5)
CHLORIDE SERPL-SCNC: 105 MMOL/L (ref 96–112)
CHOLEST SERPL-MCNC: 193 MG/DL (ref 100–199)
CO2 SERPL-SCNC: 28 MMOL/L (ref 20–33)
CREAT SERPL-MCNC: 1 MG/DL (ref 0.5–1.4)
EOSINOPHIL # BLD AUTO: 0.16 K/UL (ref 0–0.51)
EOSINOPHIL NFR BLD: 2 % (ref 0–6.9)
ERYTHROCYTE [DISTWIDTH] IN BLOOD BY AUTOMATED COUNT: 50.9 FL (ref 35.9–50)
GLOBULIN SER CALC-MCNC: 2.9 G/DL (ref 1.9–3.5)
GLUCOSE SERPL-MCNC: 127 MG/DL (ref 65–99)
HCT VFR BLD AUTO: 48.5 % (ref 37–47)
HDLC SERPL-MCNC: 55 MG/DL
HGB BLD-MCNC: 15.4 G/DL (ref 12–16)
IMM GRANULOCYTES # BLD AUTO: 0.02 K/UL (ref 0–0.11)
IMM GRANULOCYTES NFR BLD AUTO: 0.3 % (ref 0–0.9)
LDLC SERPL CALC-MCNC: 105 MG/DL
LYMPHOCYTES # BLD AUTO: 1.77 K/UL (ref 1–4.8)
LYMPHOCYTES NFR BLD: 22.1 % (ref 22–41)
MCH RBC QN AUTO: 31.4 PG (ref 27–33)
MCHC RBC AUTO-ENTMCNC: 31.8 G/DL (ref 33.6–35)
MCV RBC AUTO: 98.8 FL (ref 81.4–97.8)
MONOCYTES # BLD AUTO: 0.66 K/UL (ref 0–0.85)
MONOCYTES NFR BLD AUTO: 8.3 % (ref 0–13.4)
NEUTROPHILS # BLD AUTO: 5.33 K/UL (ref 2–7.15)
NEUTROPHILS NFR BLD: 66.5 % (ref 44–72)
NRBC # BLD AUTO: 0 K/UL
NRBC BLD-RTO: 0 /100 WBC
PLATELET # BLD AUTO: 234 K/UL (ref 164–446)
PMV BLD AUTO: 9.8 FL (ref 9–12.9)
POTASSIUM SERPL-SCNC: 4.1 MMOL/L (ref 3.6–5.5)
PROT SERPL-MCNC: 7.1 G/DL (ref 6–8.2)
RBC # BLD AUTO: 4.91 M/UL (ref 4.2–5.4)
SODIUM SERPL-SCNC: 142 MMOL/L (ref 135–145)
TRIGL SERPL-MCNC: 167 MG/DL (ref 0–149)
WBC # BLD AUTO: 8 K/UL (ref 4.8–10.8)

## 2018-10-27 PROCEDURE — 80053 COMPREHEN METABOLIC PANEL: CPT

## 2018-10-27 PROCEDURE — 36415 COLL VENOUS BLD VENIPUNCTURE: CPT

## 2018-10-27 PROCEDURE — 85025 COMPLETE CBC W/AUTO DIFF WBC: CPT

## 2018-10-27 PROCEDURE — 80061 LIPID PANEL: CPT

## 2018-11-02 ENCOUNTER — APPOINTMENT (OUTPATIENT)
Dept: MEDICAL GROUP | Facility: PHYSICIAN GROUP | Age: 68
End: 2018-11-02
Payer: MEDICARE

## 2018-11-06 ENCOUNTER — OFFICE VISIT (OUTPATIENT)
Dept: DERMATOLOGY | Facility: IMAGING CENTER | Age: 68
End: 2018-11-06
Payer: MEDICARE

## 2018-11-06 ENCOUNTER — HOSPITAL ENCOUNTER (OUTPATIENT)
Facility: MEDICAL CENTER | Age: 68
End: 2018-11-06
Attending: NURSE PRACTITIONER
Payer: MEDICARE

## 2018-11-06 VITALS
SYSTOLIC BLOOD PRESSURE: 120 MMHG | WEIGHT: 165 LBS | DIASTOLIC BLOOD PRESSURE: 62 MMHG | TEMPERATURE: 98 F | HEIGHT: 60 IN | BODY MASS INDEX: 32.39 KG/M2 | HEART RATE: 74 BPM

## 2018-11-06 DIAGNOSIS — L82.1 SEBORRHEIC KERATOSIS: ICD-10-CM

## 2018-11-06 DIAGNOSIS — L57.0 ACTINIC KERATOSES: ICD-10-CM

## 2018-11-06 DIAGNOSIS — L81.4 LENTIGO: ICD-10-CM

## 2018-11-06 DIAGNOSIS — D48.5 NEOPLASM OF UNCERTAIN BEHAVIOR OF SKIN OF CHEST: ICD-10-CM

## 2018-11-06 LAB — PATHOLOGY CONSULT NOTE: NORMAL

## 2018-11-06 PROCEDURE — 88305 TISSUE EXAM BY PATHOLOGIST: CPT

## 2018-11-06 PROCEDURE — 17000 DESTRUCT PREMALG LESION: CPT | Performed by: NURSE PRACTITIONER

## 2018-11-06 PROCEDURE — 11100 PR BIOPSY OF SKIN LESION: CPT | Mod: 59 | Performed by: NURSE PRACTITIONER

## 2018-11-06 PROCEDURE — 99212 OFFICE O/P EST SF 10 MIN: CPT | Mod: 25 | Performed by: NURSE PRACTITIONER

## 2018-11-06 NOTE — ASSESSMENT & PLAN NOTE
She has had a lesion under her right breast that is not healing and is painful for about a year. She has been putting antibiotic ointment on it but it has not resolved.

## 2018-11-06 NOTE — PROGRESS NOTES
Chief Complaint   Patient presents with   • Follow-Up     STEPHANIE       HISTORY OF PRESENT ILLNESS: Patient is a 67 y.o. female established patient who presents today for a skin check.         Neoplasm of uncertain behavior of skin of chest  She has had a lesion under her right breast that is not healing and is painful for about a year. She has been putting antibiotic ointment on it but it has not resolved.     Lentigo  Brown spot on right cheek.     Actinic keratoses  She has noticed a white rough lesion on the top of her forehead.           Allergies:Aspirin; Ibuprofen; and Tape            ROS:  Review of Systems   Constitutional: Negative for fever, chills, weight loss and malaise/fatigue.      Skin: positive for skin lesion right cheek, and under left breast and on upper forehead.   Exam:  Blood pressure 120/62, pulse 74, temperature 36.7 °C (98 °F), height 1.524 m (5'), weight 74.8 kg (165 lb), last menstrual period 09/25/2007, not currently breastfeeding.  General:  Well nourished, well developed female in NAD  An examination was performed including the scalp( including the hair) , head and face, inspection of eyelids, lips , right and left ear externally but not ear canals.  Neck and chest and back.  Including  Both upper and lower extremities, including hands and feet and nails and between fingers and toes.     All areas were found to be within normal limits except as noted in the description below.   Scalp: thinning hair  Face: upper forehead actinic keratosis. Patient consents to treatment with liquid nitrogen. One actinic keratosis is treated with liquid nitrogen.   Back with excoriations and seborrheic keratosis.  Chest: Upper chest with sun damage noted  Under left breast and erythematous 2-3 mm raised lesion with central ulceration slightly tender to palpation.  Abdomen with  well-healed surgical incision  consistent with surgical history.  Left knee with surgical incision consistent with history of knee  surgery  Consent is signed     Preoperative diagnosis: Neoplasm of uncertain behavior of the skin of the chest  Postoperative diagnosis: Neoplasm of uncertain behavior of the skin of the chest      Anesthesia: Half to 1 cc of lidocaine with epinephrine    EBL: Less than 2 cc    Complications: None    Procedure: After informed consent patient was placed on the table supine. An area 2 cm superior lateral to the skin neoplasm was swabbed with iodine. Using a 30 gauge 1/2 inch needle local anesthesia was obtained with half cc of lidocaine with epinephrine.  Using a #10 scalpel a shave biopsy was performed, lesion was  sent to pathology. Aluminum chloride was used to achieve homeostasis.   Patient tolerated the procedure well . Antibacterial ointmentand a sterile dressing was applied, patient was instructed on wound care. Patient was instructed on signs and symptoms of infection. She will call the office if these occur.      Please note that this dictation was created using voice recognition software. I have made every reasonable attempt to correct obvious errors, but I expect that there are errors of grammar and possibly content that I did not discover before finalizing the note.    Assessment/Plan:  1. Neoplasm of uncertain behavior of skin of chest   further treatment based on pathology results patient is aware she may require a wider excision if the biopsy is positive   2. Lentigo   we discussed this is a benign lesion requiring no treatment we discussed options for cosmetic treatment of desired   3. Actinic keratoses   we discussed these are precancerous lesions that need to be monitored and treated at regular intervals she will follow-up in 6 months   4.  Seborrheic keratosis we discussed these are benign lesions requiring no treatment unless it becomes symptomatic     She is encouraged to use sunscreen daily

## 2018-11-12 ENCOUNTER — TELEPHONE (OUTPATIENT)
Dept: MEDICAL GROUP | Facility: PHYSICIAN GROUP | Age: 68
End: 2018-11-12

## 2018-11-12 PROBLEM — Z98.890 S/P KNEE SURGERY: Status: ACTIVE | Noted: 2017-11-15

## 2018-11-12 NOTE — TELEPHONE ENCOUNTER
MEDICATION PRIOR AUTHORIZATION NEEDED:    1. Name of Medication: lidocaine (LIDODERM) 5 % Patch    2. Requested By (Name of Pharmacy): [facility] [phone]     3. Is insurance on file current? Yes, [employer].     What is the name & phone number of the 3rd party payor? /[employer] [phone]

## 2018-11-30 ENCOUNTER — OFFICE VISIT (OUTPATIENT)
Dept: MEDICAL GROUP | Facility: PHYSICIAN GROUP | Age: 68
End: 2018-11-30
Payer: MEDICARE

## 2018-11-30 VITALS
DIASTOLIC BLOOD PRESSURE: 68 MMHG | WEIGHT: 165 LBS | SYSTOLIC BLOOD PRESSURE: 122 MMHG | TEMPERATURE: 97.4 F | HEIGHT: 60 IN | BODY MASS INDEX: 32.39 KG/M2

## 2018-11-30 DIAGNOSIS — E55.9 VITAMIN D INSUFFICIENCY: ICD-10-CM

## 2018-11-30 DIAGNOSIS — G47.33 OBSTRUCTIVE SLEEP APNEA SYNDROME: ICD-10-CM

## 2018-11-30 DIAGNOSIS — Z79.891 CHRONIC USE OF OPIATE DRUGS THERAPEUTIC PURPOSES: ICD-10-CM

## 2018-11-30 DIAGNOSIS — F51.04 PSYCHOPHYSIOLOGICAL INSOMNIA: ICD-10-CM

## 2018-11-30 DIAGNOSIS — M54.42 CHRONIC LEFT-SIDED LOW BACK PAIN WITH LEFT-SIDED SCIATICA: ICD-10-CM

## 2018-11-30 DIAGNOSIS — E78.00 PURE HYPERCHOLESTEROLEMIA: ICD-10-CM

## 2018-11-30 DIAGNOSIS — R09.89 CHRONIC SINUS COMPLAINTS: ICD-10-CM

## 2018-11-30 DIAGNOSIS — G89.29 CHRONIC LEFT-SIDED LOW BACK PAIN WITH LEFT-SIDED SCIATICA: ICD-10-CM

## 2018-11-30 DIAGNOSIS — Z00.00 MEDICARE ANNUAL WELLNESS VISIT, SUBSEQUENT: ICD-10-CM

## 2018-11-30 PROBLEM — L57.0 ACTINIC KERATOSES: Status: RESOLVED | Noted: 2018-11-06 | Resolved: 2018-11-30

## 2018-11-30 PROBLEM — L98.9 SKIN LESION OF RIGHT LOWER EXTREMITY: Status: RESOLVED | Noted: 2018-09-25 | Resolved: 2018-11-30

## 2018-11-30 PROBLEM — G47.30 SLEEP APNEA SYNDROME: Status: ACTIVE | Noted: 2018-11-30

## 2018-11-30 PROBLEM — D48.5 NEOPLASM OF UNCERTAIN BEHAVIOR OF SKIN OF CHEEK: Status: RESOLVED | Noted: 2018-09-25 | Resolved: 2018-11-30

## 2018-11-30 PROBLEM — Z98.890 S/P KNEE SURGERY: Status: RESOLVED | Noted: 2017-11-15 | Resolved: 2018-11-30

## 2018-11-30 PROBLEM — S83.231A COMPLEX TEAR OF MEDIAL MENISCUS, CURRENT INJURY, RIGHT KNEE, INITIAL ENCOUNTER: Status: RESOLVED | Noted: 2018-07-25 | Resolved: 2018-11-30

## 2018-11-30 PROBLEM — D48.5 NEOPLASM OF UNCERTAIN BEHAVIOR OF SKIN OF CHEST: Status: RESOLVED | Noted: 2018-11-06 | Resolved: 2018-11-30

## 2018-11-30 RX ORDER — TRAMADOL HYDROCHLORIDE 50 MG/1
50-100 TABLET ORAL
Qty: 60 TAB | Refills: 0 | Status: SHIPPED | OUTPATIENT
Start: 2018-12-30 | End: 2018-11-30 | Stop reason: SDUPTHER

## 2018-11-30 RX ORDER — DEXAMETHASONE 4 MG/1
4 TABLET ORAL 2 TIMES DAILY
Qty: 10 TAB | Refills: 0 | Status: SHIPPED | OUTPATIENT
Start: 2018-11-30 | End: 2019-03-01

## 2018-11-30 RX ORDER — TRAMADOL HYDROCHLORIDE 50 MG/1
50-100 TABLET ORAL
Qty: 60 TAB | Refills: 0 | Status: SHIPPED | OUTPATIENT
Start: 2018-11-30 | End: 2018-11-30 | Stop reason: SDUPTHER

## 2018-11-30 RX ORDER — ACETAMINOPHEN AND CODEINE PHOSPHATE 300; 30 MG/1; MG/1
1-2 TABLET ORAL
Qty: 60 TAB | Refills: 0 | Status: SHIPPED | OUTPATIENT
Start: 2019-01-29 | End: 2019-03-01 | Stop reason: SDUPTHER

## 2018-11-30 RX ORDER — ACETAMINOPHEN AND CODEINE PHOSPHATE 300; 30 MG/1; MG/1
1-2 TABLET ORAL
Qty: 60 TAB | Refills: 0 | Status: SHIPPED | OUTPATIENT
Start: 2018-12-30 | End: 2018-11-30 | Stop reason: SDUPTHER

## 2018-11-30 RX ORDER — ACETAMINOPHEN AND CODEINE PHOSPHATE 300; 30 MG/1; MG/1
1-2 TABLET ORAL
Qty: 60 TAB | Refills: 0 | Status: SHIPPED | OUTPATIENT
Start: 2018-11-30 | End: 2018-11-30 | Stop reason: SDUPTHER

## 2018-11-30 RX ORDER — TRAMADOL HYDROCHLORIDE 50 MG/1
50-100 TABLET ORAL
Qty: 60 TAB | Refills: 0 | Status: SHIPPED | OUTPATIENT
Start: 2019-01-29 | End: 2019-03-01 | Stop reason: SDUPTHER

## 2018-11-30 ASSESSMENT — ENCOUNTER SYMPTOMS: GENERAL WELL-BEING: GOOD

## 2018-11-30 ASSESSMENT — ACTIVITIES OF DAILY LIVING (ADL): BATHING_REQUIRES_ASSISTANCE: 0

## 2018-11-30 ASSESSMENT — PATIENT HEALTH QUESTIONNAIRE - PHQ9: CLINICAL INTERPRETATION OF PHQ2 SCORE: 0

## 2018-11-30 NOTE — ASSESSMENT & PLAN NOTE
Ongoing chronic problem with pain in lower back radiating in left leg and foot. The pain is worsening over time, but is stable. Treated with gabapentin, Tramadol in am, Tylenol #3 in pm only (because it causes drowsiness), lidocaine patches, and procedural management with Dr. Araiza. She just had epidural in May.  Followed by PCP every 3 months.  Monitor

## 2018-11-30 NOTE — PROGRESS NOTES
Chief Complaint   Patient presents with   • Annual Wellness Visit         HPI:  Kayla is a 68 y.o. here for Medicare Annual Wellness Visit    Patient Active Problem List    Diagnosis Date Noted   • Sleep apnea syndrome 11/30/2018   • BMI 30.0-30.9,adult 09/07/2018   • Chronic left-sided low back pain with left-sided sciatica 11/15/2017   • Pure hypercholesterolemia 07/12/2017   • Vitamin D insufficiency 07/11/2017   • Psychophysiological insomnia 07/11/2017   • Chronic use of opiate drugs therapeutic purposes 02/01/2017   • Chronic sinus complaints 03/18/2016       Current Outpatient Prescriptions   Medication Sig Dispense Refill   • [START ON 1/29/2019] Acetaminophen-Codeine (TYLENOL/CODEINE #3) 300-30 MG Tab Take 1-2 Tabs by mouth 1 time daily as needed (severe pain at bedtime only) for up to 30 days. 60 Tab 0   • [START ON 1/29/2019] tramadol (ULTRAM) 50 MG Tab Take 1-2 Tabs by mouth 1 time daily as needed for Severe Pain (6 hours apart from Tylenol #3) for up to 30 days. 60 Tab 0   • dexamethasone (DECADRON) 4 MG Tab Take 1 Tab by mouth 2 times a day. 10 Tab 0   • lidocaine (LIDODERM) 5 % Patch Apply 2 Patches to skin as directed every 24 hours. To knee and back 60 Patch 11   • gabapentin (NEURONTIN) 300 MG Cap Take 3 Caps by mouth 2 Times a Day. 540 Cap 3   • Melatonin 1 MG Tab Take  by mouth every day.     • fexofenadine-pseudoephedrine (ALLEGRA-D)  MG per tablet TAKE 1 TABLET BY MOUTH 2 TIMES A DAY. 60 Tab 3   • guaiFENesin LA (MUCINEX) 600 MG TABLET SR 12 HR Take 600 mg by mouth as needed.     • eucalyptus/peppermint oil (PONARIS NASAL) Solution Spray 10 Drops in nose every 6 hours as needed. 1 Bottle 2   • albuterol 108 (90 BASE) MCG/ACT Aero Soln inhalation aerosol Inhale 2 Puffs by mouth every 6 hours as needed for Shortness of Breath. 8.5 g 3     No current facility-administered medications for this visit.         Patient is taking medications as noted in medication list.  Current supplements  as per medication list.     Allergies: Aspirin; Ibuprofen; and Tape    Current social contact/activities: travels, works, spends time with family      Is patient current with immunizations? Yes.    She  reports that she quit smoking about 16 years ago. Her smoking use included Cigarettes. She has a 60.00 pack-year smoking history. She has never used smokeless tobacco. She reports that she drinks alcohol. She reports that she does not use drugs.  Counseling given: Not Answered        DPA/Advanced directive: Patient does not have an Advanced Directive.  A packet and workshop information was given on Advanced Directives.    ROS:    Gait: Uses no assistive device   Ostomy: No   Other tubes: No   Amputations: No   Chronic oxygen use No   Last eye exam 2018   Wears hearing aids: No   : Reports urinary leakage during the last 6 months that has somewhat interfered with their daily activities or sleep.      Screening:    Depression Screening    Little interest or pleasure in doing things?  0 - not at all  Feeling down, depressed, or hopeless? 0 - not at all  Patient Health Questionnaire Score: 0    If depressive symptoms identified deferred to follow up visit unless specifically addressed in assessment and plan.    Interpretation of PHQ-9 Total Score   Score Severity   1-4 No Depression   5-9 Mild Depression   10-14 Moderate Depression   15-19 Moderately Severe Depression   20-27 Severe Depression    Screening for Cognitive Impairment    Three Minute Recall (leader, season, table)  3/3 Leader, season, table  Ismael clock face with all 12 numbers and set the hands to show 10 past 11.  Yes 3 /5 time 11:10  If cognitive concerns identified, deferred for follow up unless specifically addressed in assessment and plan.    Fall Risk Assessment    Has the patient had two or more falls in the last year or any fall with injury in the last year?  No  If fall risk identified, deferred for follow up unless specifically addressed in  assessment and plan.    Safety Assessment    Throw rugs on floor.  No  Handrails on all stairs.  Yes  Good lighting in all hallways.  Yes  Difficulty hearing.  No  Patient counseled about all safety risks that were identified.    Functional Assessment ADLs    Are there any barriers preventing you from cooking for yourself or meeting nutritional needs?  No.    Are there any barriers preventing you from driving safely or obtaining transportation?  No.    Are there any barriers preventing you from using a telephone or calling for help?  No.    Are there any barriers preventing you from shopping?  No.    Are there any barriers preventing you from taking care of your own finances?  No.    Are there any barriers preventing you from managing your medications?  No.    Are there any barriers preventing you from showering, bathing or dressing yourself?  No.    Are you currently engaging in any exercise or physical activity?  Yes.  Still working, travel  What is your perception of your health?  Good.    Health Maintenance Summary                IMM ZOSTER VACCINES Overdue 1/24/2017      Done 11/29/2016 Imm Admin: Zoster Vaccine Live (ZVL) (Zostavax)    MAMMOGRAM Next Due 12/6/2018      Done 12/6/2017      Patient has more history with this topic...    URINE DRUG SCREEN Next Due 5/9/2019      Done 5/14/2018 [facility] SCREEN     Patient has more history with this topic...    COLONOSCOPY Next Due 11/29/2019      Done 11/29/2009 [facility]    Annual Wellness Visit Next Due 12/1/2019      Done 11/30/2018      Patient has more history with this topic...    BONE DENSITY Next Due 12/6/2022      Done 12/6/2017      Patient has more history with this topic...    IMM DTaP/Tdap/Td Vaccine Next Due 11/29/2026      Done 11/29/2016 Imm Admin: Tdap Vaccine          Patient Care Team:  KATY Mcfarlane as PCP - General (Family Medicine)  Patricia Soria M.D. as Consulting Physician (Neurosurgery)  Don  STACY Pettit O.D. as Consulting Physician (Optometry)    Social History   Substance Use Topics   • Smoking status: Former Smoker     Packs/day: 1.50     Years: 40.00     Types: Cigarettes     Quit date: 3/1/2002   • Smokeless tobacco: Never Used   • Alcohol use 0.0 oz/week      Comment: 1 per day     Family History   Problem Relation Age of Onset   • Heart Disease Mother    • Dementia Mother    • Cancer Brother         lead     She  has a past medical history of Actinic keratoses (11/6/2018); Allergy; Anesthesia (07/2018); Arthritis (07/2018); Breath shortness (07/2018); Cataract; Dental disorder; Emphysema of lung (HCC) (07/2018); GERD (gastroesophageal reflux disease); Heart burn; Lentigo (11/6/2018); Neoplasm of uncertain behavior of skin of cheek (9/25/2018); Neoplasm of uncertain behavior of skin of chest (11/6/2018); Personal history of venous thrombosis and embolism (1982); Pneumonia; Renal disorder; Skin lesion of right lower extremity (9/25/2018); Sleep apnea (07/2018); and Unspecified hemorrhagic conditions. She also has no past medical history of Backpain.   Past Surgical History:   Procedure Laterality Date   • KNEE ARTHROSCOPY Right 7/25/2018    Procedure: KNEE ARTHROSCOPY;  Surgeon: Goldy Tran M.D.;  Location: [facility];  Service: Orthopedics   • MEDIAL MENISCECTOMY  7/25/2018    Procedure: MEDIAL MENISCECTOMY- PARTIAL;  Surgeon: Goldy Tran M.D.;  Location: [facility];  Service: Orthopedics   • CHONDROPLASTY  7/25/2018    Procedure: CHONDROPLASTY ;  Surgeon: Goldy Tran M.D.;  Location: [facility];  Service: Orthopedics   • CATARACT EXTRACTION WITH IOL Bilateral 2013   • NASAL SEPTAL RECONSTRUCTION  2013    sinus surgery   • KNEE ARTHROPLASTY TOTAL  3/18/2009    Performed by SURI PARK at [facility]   • TUBE & ECTOPIC PREG., REMOVAL  1982   • CYST EXCISION Right 1970    right breast   • HYSTERECTOMY, VAGINAL     • VA THROAT  SURGERY PROCEDURE UNLISTED     • TONSILLECTOMY             Exam:     Blood pressure 122/68, temperature 36.3 °C (97.4 °F), height 1.524 m (5'), weight 74.8 kg (165 lb), last menstrual period 09/25/2007, not currently breastfeeding. Body mass index is 32.22 kg/m².    Hearing excellent.    Dentition good  Alert, oriented in no acute distress.  Eye contact is good, speech goal directed, affect calm      Assessment and Plan. The following treatment and monitoring plan is recommended:    1. Medicare annual wellness visit, subsequent     2. Vitamin D insufficiency  Chronic problem currently managed with no supplementation.  Followed by PCP and due for labs for monitoring.   3. Pure hypercholesterolemia  Chronic problem that is stable without treatment based on 10/2018 lipid panel.  Followed by PCP monitoring lipids annually.   4. Chronic left-sided low back pain with left-sided sciatica  Ongoing chronic problem with pain in lower back radiating in left leg and foot. The pain is worsening over time, but is stable. Treated with gabapentin, Tramadol in am, Tylenol #3 in pm only (because it causes drowsiness), lidocaine patches, and procedural management with Dr. Araiza. She just had epidural in May.  Followed by PCP every 3 months.  Monitor   5. Chronic use of opiate drugs therapeutic purposes  Chronic problem due to use of tramadol and Tylenol 3.  Followed by PCP monitoring urine drug screen annually and doing pain evaluation every 3 months.  Stable.   6. Obstructive sleep apnea syndrome  Established diagnosis, but is not on CPAP any longer for treatment d/t patient discontinuing on her own. Not following anyone for this problem, therefore is not controlled and needs referral to pulmonology   REFERRAL TO SLEEP STUDIES   7. Psychophysiological insomnia  Ongoing problem managed with OTC Melatonin, but is not controlled. Has coexisting sleep apnea and this is not controlled which may be contributing. Followed by PCP. Monitor.     8. Chronic sinus complaints  Chronic problem currently managed with Allegra-D, but is not controlled. Followed by PCP and ENT. Needs f/u on this   REFERRAL TO ENT   9. BMI 30.0-30.9,adult  Chronic problem. Stable. Patient maintains healthy diet. She is aware of the relationship between healthy nutrition, regular physical activity, and overall health.  Followed by PCP monitoring weight every visit           Services suggested: No services needed at this time  Health Care Screening recommendations as per orders if indicated.  Referrals offered: PT/OT/Nutrition counseling/Behavioral Health/Smoking cessation as per orders if indicated.    Discussion today about general wellness and lifestyle habits:    · Prevent falls and reduce trip hazards; Cautioned about securing or removing rugs.  · Have a working fire alarm and carbon monoxide detector;   · Engage in regular physical activity and social activities       Follow-up: Return in about 3 months (around 2/28/2019).

## 2018-11-30 NOTE — ASSESSMENT & PLAN NOTE
Chronic problem currently managed with Allegra-D, but is not controlled. Followed by PCP and ENT. Needs f/u on this

## 2018-11-30 NOTE — ASSESSMENT & PLAN NOTE
Chronic problem currently managed with no supplementation.  Followed by PCP and due for labs for monitoring.

## 2018-11-30 NOTE — ASSESSMENT & PLAN NOTE
Chronic problem. Stable. Patient maintains healthy diet. She is aware of the relationship between healthy nutrition, regular physical activity, and overall health.  Followed by PCP monitoring weight every visit

## 2018-11-30 NOTE — ASSESSMENT & PLAN NOTE
Established diagnosis, but is not on CPAP any longer for treatment d/t patient discontinuing on her own. Not following anyone for this problem, therefore is not controlled and needs referral to pulmonology

## 2018-11-30 NOTE — ASSESSMENT & PLAN NOTE
Ongoing problem managed with OTC Melatonin, but is not controlled. Has coexisting sleep apnea and this is not controlled which may be contributing. Followed by PCP. Monitor.

## 2018-11-30 NOTE — ASSESSMENT & PLAN NOTE
Chronic problem due to use of tramadol and Tylenol 3.  Followed by PCP monitoring urine drug screen annually and doing pain evaluation every 3 months.  Stable.

## 2018-11-30 NOTE — ASSESSMENT & PLAN NOTE
Chronic problem that is stable without treatment based on 10/2018 lipid panel.  Followed by PCP monitoring lipids annually.

## 2019-01-21 ENCOUNTER — OFFICE VISIT (OUTPATIENT)
Dept: URGENT CARE | Facility: PHYSICIAN GROUP | Age: 69
End: 2019-01-21
Payer: MEDICARE

## 2019-01-21 VITALS
SYSTOLIC BLOOD PRESSURE: 120 MMHG | HEIGHT: 60 IN | WEIGHT: 168 LBS | OXYGEN SATURATION: 97 % | HEART RATE: 74 BPM | BODY MASS INDEX: 32.98 KG/M2 | DIASTOLIC BLOOD PRESSURE: 88 MMHG | TEMPERATURE: 98.2 F

## 2019-01-21 DIAGNOSIS — J06.9 UPPER RESPIRATORY TRACT INFECTION, UNSPECIFIED TYPE: ICD-10-CM

## 2019-01-21 DIAGNOSIS — J02.9 PHARYNGITIS, UNSPECIFIED ETIOLOGY: ICD-10-CM

## 2019-01-21 PROCEDURE — 99214 OFFICE O/P EST MOD 30 MIN: CPT | Performed by: PHYSICIAN ASSISTANT

## 2019-01-21 RX ORDER — AMOXICILLIN AND CLAVULANATE POTASSIUM 875; 125 MG/1; MG/1
1 TABLET, FILM COATED ORAL 2 TIMES DAILY
Qty: 20 TAB | Refills: 0 | Status: SHIPPED | OUTPATIENT
Start: 2019-01-21 | End: 2019-03-01

## 2019-01-21 ASSESSMENT — ENCOUNTER SYMPTOMS
SHORTNESS OF BREATH: 0
SPUTUM PRODUCTION: 0
SORE THROAT: 1
VOMITING: 0
COUGH: 0
ABDOMINAL PAIN: 0
WHEEZING: 0
DIARRHEA: 0
NAUSEA: 0
CHILLS: 0
FEVER: 0

## 2019-01-21 NOTE — PROGRESS NOTES
Subjective:   Kayla Jensen is a 68 y.o. female who presents for Pharyngitis (x 4 days)        Pharyngitis    This is a new problem. The current episode started in the past 7 days. Associated symptoms include congestion. Pertinent negatives include no abdominal pain, coughing, diarrhea, ear pain, shortness of breath or vomiting.     Notes last 3-4d of ST, worse at night, denies fever/chills, denies cough/earpain, c/o bad ST, denies nausea/voimting/abdpain/diarrhea/rash, tried no meds, tried mucinex d.    Review of Systems   Constitutional: Negative for chills and fever.   HENT: Positive for congestion and sore throat. Negative for ear pain.    Respiratory: Negative for cough, sputum production, shortness of breath and wheezing.    Gastrointestinal: Negative for abdominal pain, diarrhea, nausea and vomiting.   Skin: Negative for rash.   Endo/Heme/Allergies: Positive for environmental allergies.     Allergies   Allergen Reactions   • Aspirin      Stomach pain   • Ibuprofen      Stomach pain   • Tape Rash   I have worn a mask for the entire encounter with this patient.      Objective:   /88   Pulse 74   Temp 36.8 °C (98.2 °F) (Temporal)   Ht 1.524 m (5')   Wt 76.2 kg (168 lb)   LMP 09/25/2007   SpO2 97%   BMI 32.81 kg/m²   Physical Exam   Constitutional: She is oriented to person, place, and time. She appears well-developed and well-nourished. No distress.   HENT:   Head: Normocephalic and atraumatic.   Right Ear: Tympanic membrane, external ear and ear canal normal.   Left Ear: Tympanic membrane, external ear and ear canal normal.   Nose: Nose normal.   Mouth/Throat: Uvula is midline and mucous membranes are normal. Posterior oropharyngeal erythema ( mild PND) present. No oropharyngeal exudate, posterior oropharyngeal edema or tonsillar abscesses.   Eyes: Conjunctivae and lids are normal. Right eye exhibits no discharge. Left eye exhibits no discharge. No scleral icterus.   Neck: Neck supple.    Pulmonary/Chest: Effort normal. No respiratory distress. She has no decreased breath sounds. She has no wheezes. She has no rhonchi. She has no rales.   Musculoskeletal: Normal range of motion.   Lymphadenopathy:     She has cervical adenopathy ( mild bilat).   Neurological: She is alert and oriented to person, place, and time. She is not disoriented.   Skin: Skin is warm and dry. She is not diaphoretic. No erythema. No pallor.   Psychiatric: Her speech is normal and behavior is normal.   Nursing note and vitals reviewed.  POCT strep -NEG      Assessment/Plan:   1. Pharyngitis, unspecified etiology  - lidocaine viscous 2% (XYLOCAINE) 2 % Solution; Take 5 mL by mouth as needed for Throat/Mouth Pain (q6hr PRN throat pain, ok to rinse and spit or swallow).  Dispense: 120 mL; Refill: 0  - amoxicillin-clavulanate (AUGMENTIN) 875-125 MG Tab; Take 1 Tab by mouth 2 times a day.  Dispense: 20 Tab; Refill: 0    2. Upper respiratory tract infection, unspecified type  Supportive care is reviewed with patient/caregiver - recommend to push PO fluids and electrolytes, Nsaids/tylenol, netti pot/saline irrig, humidifier in home, flonase, ponaris, antihistamine  I suspect viral URI - pt would like abx, Contingent antibiotic prescription given to patient to fill upon meeting criteria of guidelines discussed.   Call if needs ENT referral  Differential diagnosis, natural history, supportive care, and indications for immediate follow-up discussed.

## 2019-02-25 ENCOUNTER — TELEPHONE (OUTPATIENT)
Dept: MEDICAL GROUP | Facility: PHYSICIAN GROUP | Age: 69
End: 2019-02-25

## 2019-02-25 NOTE — TELEPHONE ENCOUNTER
Future Appointments       Provider Department Center    3/1/2019 7:40 AM KATY Mcfarlane [facility] [place]    4/3/2019 8:20 AM RHIANNON Plaza [facility] Sleep Medicine           ESTABLISHED PATIENT PRE-VISIT PLANNING     Note: Patient will not be contacted if there is no indication to call. PT was not Contacted.    1.    Reviewed note from last office visit with PCP: YES Last office visit: 11/30/18    2.  If any orders were placed at last visit, do we have Results/Consult Notes?        •  Labs - Labs were not ordered at last office visit.        •  Imaging - Imaging was not ordered at last office visit.        •  Referrals - No referrals were ordered at last office visit.     3.  Immunizations were updated in [facility] using [employer]?: Yes       •  [employer] Recommendations: SHINGRIX (Shingles)    4.  Patient is due for the following Health Maintenance Topics:   Health Maintenance Due   Topic Date Due   • IMM ZOSTER VACCINES (2 of 3) 01/24/2017   • MAMMOGRAM  12/06/2018   • URINE DRUG SCREEN  05/09/2019       - Patient plans to schedule appointment for Mammogram.    5.  Patient was not informed to arrive 15 min prior to their scheduled appointment and bring in their medication bottles.

## 2019-03-01 ENCOUNTER — HOSPITAL ENCOUNTER (OUTPATIENT)
Dept: LAB | Facility: MEDICAL CENTER | Age: 69
End: 2019-03-01
Attending: NURSE PRACTITIONER
Payer: MEDICARE

## 2019-03-01 ENCOUNTER — OFFICE VISIT (OUTPATIENT)
Dept: MEDICAL GROUP | Facility: PHYSICIAN GROUP | Age: 69
End: 2019-03-01
Payer: MEDICARE

## 2019-03-01 VITALS
HEIGHT: 60 IN | BODY MASS INDEX: 33.77 KG/M2 | SYSTOLIC BLOOD PRESSURE: 128 MMHG | DIASTOLIC BLOOD PRESSURE: 86 MMHG | TEMPERATURE: 97.1 F | OXYGEN SATURATION: 94 % | HEART RATE: 85 BPM | WEIGHT: 172 LBS

## 2019-03-01 DIAGNOSIS — R53.83 MALAISE AND FATIGUE: ICD-10-CM

## 2019-03-01 DIAGNOSIS — R09.89 CHRONIC SINUS COMPLAINTS: ICD-10-CM

## 2019-03-01 DIAGNOSIS — R06.09 DOE (DYSPNEA ON EXERTION): ICD-10-CM

## 2019-03-01 DIAGNOSIS — E55.9 VITAMIN D DEFICIENCY: ICD-10-CM

## 2019-03-01 DIAGNOSIS — G47.33 OBSTRUCTIVE SLEEP APNEA SYNDROME: ICD-10-CM

## 2019-03-01 DIAGNOSIS — R53.81 MALAISE AND FATIGUE: ICD-10-CM

## 2019-03-01 DIAGNOSIS — G89.29 CHRONIC LEFT-SIDED LOW BACK PAIN WITH LEFT-SIDED SCIATICA: ICD-10-CM

## 2019-03-01 DIAGNOSIS — Z79.891 CHRONIC USE OF OPIATE DRUGS THERAPEUTIC PURPOSES: ICD-10-CM

## 2019-03-01 DIAGNOSIS — Z12.31 VISIT FOR SCREENING MAMMOGRAM: ICD-10-CM

## 2019-03-01 DIAGNOSIS — M54.42 CHRONIC LEFT-SIDED LOW BACK PAIN WITH LEFT-SIDED SCIATICA: ICD-10-CM

## 2019-03-01 LAB
25(OH)D3 SERPL-MCNC: 16 NG/ML (ref 30–100)
ANION GAP SERPL CALC-SCNC: 6 MMOL/L (ref 0–11.9)
BASOPHILS # BLD AUTO: 0.6 % (ref 0–1.8)
BASOPHILS # BLD: 0.05 K/UL (ref 0–0.12)
BNP SERPL-MCNC: 40 PG/ML (ref 0–100)
BUN SERPL-MCNC: 21 MG/DL (ref 8–22)
CALCIUM SERPL-MCNC: 10.5 MG/DL (ref 8.5–10.5)
CHLORIDE SERPL-SCNC: 106 MMOL/L (ref 96–112)
CO2 SERPL-SCNC: 30 MMOL/L (ref 20–33)
CREAT SERPL-MCNC: 0.83 MG/DL (ref 0.5–1.4)
EOSINOPHIL # BLD AUTO: 0.23 K/UL (ref 0–0.51)
EOSINOPHIL NFR BLD: 2.8 % (ref 0–6.9)
ERYTHROCYTE [DISTWIDTH] IN BLOOD BY AUTOMATED COUNT: 50.7 FL (ref 35.9–50)
GLUCOSE SERPL-MCNC: 95 MG/DL (ref 65–99)
HCT VFR BLD AUTO: 48.4 % (ref 37–47)
HGB BLD-MCNC: 15.4 G/DL (ref 12–16)
IMM GRANULOCYTES # BLD AUTO: 0.03 K/UL (ref 0–0.11)
IMM GRANULOCYTES NFR BLD AUTO: 0.4 % (ref 0–0.9)
LYMPHOCYTES # BLD AUTO: 2.38 K/UL (ref 1–4.8)
LYMPHOCYTES NFR BLD: 29.1 % (ref 22–41)
MCH RBC QN AUTO: 31.8 PG (ref 27–33)
MCHC RBC AUTO-ENTMCNC: 31.8 G/DL (ref 33.6–35)
MCV RBC AUTO: 100 FL (ref 81.4–97.8)
MONOCYTES # BLD AUTO: 0.67 K/UL (ref 0–0.85)
MONOCYTES NFR BLD AUTO: 8.2 % (ref 0–13.4)
NEUTROPHILS # BLD AUTO: 4.82 K/UL (ref 2–7.15)
NEUTROPHILS NFR BLD: 58.9 % (ref 44–72)
NRBC # BLD AUTO: 0 K/UL
NRBC BLD-RTO: 0 /100 WBC
PLATELET # BLD AUTO: 209 K/UL (ref 164–446)
PMV BLD AUTO: 9.8 FL (ref 9–12.9)
POTASSIUM SERPL-SCNC: 4.2 MMOL/L (ref 3.6–5.5)
RBC # BLD AUTO: 4.84 M/UL (ref 4.2–5.4)
SODIUM SERPL-SCNC: 142 MMOL/L (ref 135–145)
TSH SERPL DL<=0.005 MIU/L-ACNC: 1.19 UIU/ML (ref 0.38–5.33)
VIT B12 SERPL-MCNC: 899 PG/ML (ref 211–911)
WBC # BLD AUTO: 8.2 K/UL (ref 4.8–10.8)

## 2019-03-01 PROCEDURE — 83880 ASSAY OF NATRIURETIC PEPTIDE: CPT

## 2019-03-01 PROCEDURE — 99214 OFFICE O/P EST MOD 30 MIN: CPT | Performed by: NURSE PRACTITIONER

## 2019-03-01 PROCEDURE — 36415 COLL VENOUS BLD VENIPUNCTURE: CPT

## 2019-03-01 PROCEDURE — 82306 VITAMIN D 25 HYDROXY: CPT

## 2019-03-01 PROCEDURE — 85025 COMPLETE CBC W/AUTO DIFF WBC: CPT

## 2019-03-01 PROCEDURE — 84443 ASSAY THYROID STIM HORMONE: CPT

## 2019-03-01 PROCEDURE — 80048 BASIC METABOLIC PNL TOTAL CA: CPT

## 2019-03-01 PROCEDURE — 8041 PR SCP AHA: Performed by: NURSE PRACTITIONER

## 2019-03-01 PROCEDURE — 82607 VITAMIN B-12: CPT

## 2019-03-01 PROCEDURE — 93000 ELECTROCARDIOGRAM COMPLETE: CPT | Performed by: NURSE PRACTITIONER

## 2019-03-01 RX ORDER — ACETAMINOPHEN AND CODEINE PHOSPHATE 300; 30 MG/1; MG/1
1-2 TABLET ORAL
Qty: 60 TAB | Refills: 0 | Status: SHIPPED | OUTPATIENT
Start: 2019-03-31 | End: 2019-03-01 | Stop reason: SDUPTHER

## 2019-03-01 RX ORDER — TRAMADOL HYDROCHLORIDE 50 MG/1
50-100 TABLET ORAL
Qty: 60 TAB | Refills: 0 | Status: SHIPPED | OUTPATIENT
Start: 2019-03-01 | End: 2019-03-01 | Stop reason: SDUPTHER

## 2019-03-01 RX ORDER — TRAMADOL HYDROCHLORIDE 50 MG/1
50-100 TABLET ORAL
Qty: 60 TAB | Refills: 0 | Status: SHIPPED | OUTPATIENT
Start: 2019-04-30 | End: 2019-05-24 | Stop reason: SDUPTHER

## 2019-03-01 RX ORDER — ACETAMINOPHEN AND CODEINE PHOSPHATE 300; 30 MG/1; MG/1
1-2 TABLET ORAL
Qty: 60 TAB | Refills: 0 | Status: SHIPPED | OUTPATIENT
Start: 2019-03-01 | End: 2019-03-01 | Stop reason: SDUPTHER

## 2019-03-01 RX ORDER — ACETAMINOPHEN AND CODEINE PHOSPHATE 300; 30 MG/1; MG/1
1-2 TABLET ORAL
Qty: 60 TAB | Refills: 0 | Status: SHIPPED | OUTPATIENT
Start: 2019-04-30 | End: 2019-05-24 | Stop reason: SDUPTHER

## 2019-03-01 RX ORDER — TRAMADOL HYDROCHLORIDE 50 MG/1
50-100 TABLET ORAL
Qty: 60 TAB | Refills: 0 | Status: SHIPPED | OUTPATIENT
Start: 2019-03-31 | End: 2019-03-01 | Stop reason: SDUPTHER

## 2019-03-01 NOTE — ASSESSMENT & PLAN NOTE
Has appointment scheduled in April for management of this with pulmonology. Not currently being treated.

## 2019-03-01 NOTE — ASSESSMENT & PLAN NOTE
She has appointment in July with Dr. Melendez. May need referral to Dr. Resendiz instead. She will let me know.

## 2019-03-01 NOTE — ASSESSMENT & PLAN NOTE
Last dose of narcotic medication: yesterday   Analgesia: 3/10  Activity level:   Adverse events: none  Aberrant behavior: none  Affect and mood: calm and pleasant  Nonnarcotic treatments that are being used: gabapentin, epidurals  Pain management agreement initiated and signed on: 5/14/18  Most recent urine drug screen done 5/2018, and is consistent with prescribed medications  Opiate risk score: 0  Total daily opiate dosage: morphine 9.5 mEq daily

## 2019-03-01 NOTE — ASSESSMENT & PLAN NOTE
Ongoing chronic problem with pain in lower back radiating in left leg and foot. The pain is worsening over time, but is stable. Treated with gabapentin, Tramadol in am, Tylenol #3 in pm only (because it causes drowsiness), lidocaine patches were not covered by insurance, and procedural management with Dr. Araiza. She just had epidural in May 2018

## 2019-03-01 NOTE — PROGRESS NOTES
Subjective:     Chief Complaint   Patient presents with   • Medication Management     3 month fv   • Tired     [id number] getting worst       HPI  Kayla Jensen is a 68 y.o. female here today for routine f/u on back pain, but is exhausted. She is feeling exhausted for a few months. She is going to bed tired, and waking up exhausted. No worsening insomnia beyond her baseline. She goes to bed between 9-10, and is awake by 3 am. Using melatonin to help with this. She has sleep apnea consult in April. She has f/u with her ENT in July for her chronic sinus complaints. She loves her job, and feels this invigorates her, and is still looking forward to going to work, therefore no anhedonia/depression. She feels like her breathing is becoming an issue. She is feeling sob on exertion on some days. She can stop walking and relax, and breathing returns to baseline. There is no associated chest pain/pressure, nausea, palpitations, LE edema. She had WNL EKG July 2018 prior to onset of symptoms. She smoked 1.5 PPD for 40 years and quit in 2000    Chronic left-sided low back pain with left-sided sciatica  Ongoing chronic problem with pain in lower back radiating in left leg and foot. The pain is worsening over time, but is stable. Treated with gabapentin, Tramadol in am, Tylenol #3 in pm only (because it causes drowsiness), lidocaine patches were not covered by insurance, and procedural management with Dr. Araiza. She just had epidural in May 2018    Chronic use of opiate drugs therapeutic purposes  Last dose of narcotic medication: yesterday   Analgesia: 3/10  Activity level:   Adverse events: none  Aberrant behavior: none  Affect and mood: calm and pleasant  Nonnarcotic treatments that are being used: gabapentin, epidurals  Pain management agreement initiated and signed on: 5/14/18  Most recent urine drug screen done 5/2018, and is consistent with prescribed medications  Opiate risk score: 0  Total daily opiate dosage:  morphine 9.5 mEq daily               Chronic sinus complaints  She has appointment in July with Dr. Melendez. May need referral to Dr. Resendiz instead. She will let me know.     Sleep apnea syndrome  Has appointment scheduled in April for management of this with pulmonology. Not currently being treated.        Annual Health Assessment Questions:    1.  Are you currently engaging in any exercise or physical activity? Yes    2.  How would you describe your mood or emotional well-being today? good    3.  Have you had any falls in the last year? No    4.  Have you noticed any problems with your balance or had difficulty walking? Yes    5.  In the last six months have you experienced any leakage of urine? Yes    6. DPA/Advanced Directive: Patient does not have an Advanced Directive.  A packet and workshop information was given on Advanced Directives.     Diagnoses of Malaise and fatigue, MADISON (dyspnea on exertion), Chronic use of opiate drugs therapeutic purposes, Chronic left-sided low back pain with left-sided sciatica, Chronic sinus complaints, Obstructive sleep apnea syndrome, Vitamin D deficiency, and Visit for screening mammogram were pertinent to this visit.    Allergies: Aspirin; Ibuprofen; and Tape  Current medicines (including changes today)  Current Outpatient Prescriptions   Medication Sig Dispense Refill   • [START ON 4/30/2019] Acetaminophen-Codeine (TYLENOL/CODEINE #3) 300-30 MG Tab Take 1-2 Tabs by mouth 1 time daily as needed (severe pain at bedtime only) for up to 30 days. 60 Tab 0   • [START ON 4/30/2019] tramadol (ULTRAM) 50 MG Tab Take 1-2 Tabs by mouth 1 time daily as needed for Severe Pain (6 hours apart from Tylenol #3) for up to 30 days. 60 Tab 0   • gabapentin (NEURONTIN) 300 MG Cap Take 3 Caps by mouth 2 Times a Day. 540 Cap 3   • Melatonin 1 MG Tab Take  by mouth every day.     • fexofenadine-pseudoephedrine (ALLEGRA-D)  MG per tablet TAKE 1 TABLET BY MOUTH 2 TIMES A DAY. 60 Tab 3   •  eucalyptus/peppermint oil (PONARIS NASAL) Solution Spray 10 Drops in nose every 6 hours as needed. 1 Bottle 2   • albuterol 108 (90 BASE) MCG/ACT Aero Soln inhalation aerosol Inhale 2 Puffs by mouth every 6 hours as needed for Shortness of Breath. 8.5 g 3     No current facility-administered medications for this visit.        She  has a past medical history of Actinic keratoses (11/6/2018); Allergy; Anesthesia (07/2018); Arthritis (07/2018); Breath shortness (07/2018); Cataract; Dental disorder; Emphysema of lung (HCC) (07/2018); GERD (gastroesophageal reflux disease); Heart burn; Lentigo (11/6/2018); Neoplasm of uncertain behavior of skin of cheek (9/25/2018); Neoplasm of uncertain behavior of skin of chest (11/6/2018); Personal history of venous thrombosis and embolism (1982); Pneumonia; Renal disorder; Skin lesion of right lower extremity (9/25/2018); Sleep apnea (07/2018); and Unspecified hemorrhagic conditions. She also has no past medical history of Backpain.        ROS  As stated in HPI and additionally  Gen: +4 lb weight gain. +fatigue and insomnia. No F/C, night sweats  Neuro: No unusual headache, extremity weakness, dizziness  CV: see HPI   Pulm; +sob on exertion. No dyspnea, cough, wheezing, hemoptysi     Objective:     Blood pressure 128/86, pulse 85, temperature 36.2 °C (97.1 °F), temperature source Temporal, height 1.524 m (5'), weight 78 kg (172 lb), last menstrual period 09/25/2007, SpO2 94 %, not currently breastfeeding. Body mass index is 33.59 kg/m².  Physical Exam:  General: Alert, oriented, in no acute distress.  Eye contact is good, speech goal directed, affect calm  CNs grossly intact.  HEENT: conjunctiva non-injected, sclera non-icteric, EOMs intact. No lid edema or eye drainage.   Gross hearing intact.   Neck: Supple. No adenopathy or masses in the cervical or supraclavicular regions. No thyromegaly  Lungs: unlabored. clear to auscultation bilaterally with good excursion.  CV: regular rate  and rhythm. No murmurs. No carotid bruits.   Ext: no edema  Skin: No rashes or lesions in visible areas  Gait steady.     EKG Interpretation-HR is 60 normal EKG, normal sinus rhythm, unchanged from previous tracings    Assessment and Plan:   Assessment/Plan:  1. Malaise and fatigue  New problem. EKG without sign of ischemia or arrhythmia. Check labs. Check PFT. Monitor for results.   - CBC WITH DIFFERENTIAL; Future  - VITAMIN B12; Future  - TSH WITH REFLEX TO FT4; Future  - Basic Metabolic Panel; Future  - BTYPE NATRIURETIC PEPTIDE; Future  - EKG  - PULMONARY FUNCTION TESTS -Test requested: Complete Pulmonary Function Test; Future    2. MADISON (dyspnea on exertion)  New problem. COPD? EKG stable today. Check BNP and if significantly elevated, will get echo. Check PFT to establish diagnosis of COPD  - BTYPE NATRIURETIC PEPTIDE; Future  - EKG  - PULMONARY FUNCTION TESTS -Test requested: Complete Pulmonary Function Test; Future    3. Chronic use of opiate drugs therapeutic purposes  Obtained and reviewed the patient utilization report state pharmacy database on 3/1/2019.  Based on assessment of report prescription for [Tramadol and T#3 medication] is medically necessary. Patient has not requested any early refills and exhibits no abberant behavior. I have advised patient to keep medication and safe place and to not drive, use alcohol, or take illicit drugs while taking this medication.    4. Chronic left-sided low back pain with left-sided sciatica  Chronic stable problem continue current pain management plan   - Acetaminophen-Codeine (TYLENOL/CODEINE #3) 300-30 MG Tab; Take 1-2 Tabs by mouth 1 time daily as needed (severe pain at bedtime only) for up to 30 days.  Dispense: 60 Tab; Refill: 0  - tramadol (ULTRAM) 50 MG Tab; Take 1-2 Tabs by mouth 1 time daily as needed for Severe Pain (6 hours apart from Tylenol #3) for up to 30 days.  Dispense: 60 Tab; Refill: 0    5. Chronic sinus complaints  Not controlled. Will try to  schedule with someone else in practice, otherwise will refer to Dr. Resendiz    6. Obstructive sleep apnea syndrome  Keep April appointment for management plan     7. Vitamin D deficiency  Status unk since on supplementation. Check labs  - VITAMIN D,25 HYDROXY; Future    8. Visit for screening mammogram  - MA-SCREEN MAMMO W/CAD-BILAT; Future    I attest that I saw the patient on this date. The attached note is mine, however, due to technical issues I am not showing as the author of the note.  XAVI Mcfarlane.         Follow up:  Return in about 3 months (around 6/1/2019).    Educated in proper administration of medication(s) ordered today including safety, possible SE, risks, benefits, rationale and alternatives to therapy.   Supportive care, differential diagnoses, and indications for immediate follow-up discussed with patient.    Pathogenesis of diagnosis discussed including typical length and natural progression.    Instructed to return to clinic or nearest emergency department for any change in condition, further concerns, or worsening of symptoms.  Patient states understanding of the plan of care and discharge instructions.      Please note that this dictation was created using voice recognition software. I have made every reasonable attempt to correct obvious errors, but I expect that there are errors of grammar and possibly content that I did not discover before finalizing the note.    Followup: Return in about 3 months (around 6/1/2019). sooner should new symptoms or problems arise.

## 2019-03-25 ENCOUNTER — NON-PROVIDER VISIT (OUTPATIENT)
Dept: PULMONOLOGY | Facility: HOSPICE | Age: 69
End: 2019-03-25
Attending: NURSE PRACTITIONER
Payer: MEDICARE

## 2019-03-25 DIAGNOSIS — R06.09 DOE (DYSPNEA ON EXERTION): ICD-10-CM

## 2019-03-25 DIAGNOSIS — R53.83 MALAISE AND FATIGUE: ICD-10-CM

## 2019-03-25 DIAGNOSIS — R53.81 MALAISE AND FATIGUE: ICD-10-CM

## 2019-03-25 PROCEDURE — 94729 DIFFUSING CAPACITY: CPT | Performed by: INTERNAL MEDICINE

## 2019-03-25 PROCEDURE — 94726 PLETHYSMOGRAPHY LUNG VOLUMES: CPT | Performed by: INTERNAL MEDICINE

## 2019-03-25 PROCEDURE — 94060 EVALUATION OF WHEEZING: CPT | Performed by: INTERNAL MEDICINE

## 2019-03-25 ASSESSMENT — PULMONARY FUNCTION TESTS
FEV1/FVC: 73
FEV1/FVC_PERCENT_PREDICTED: 94
FVC_PERCENT_PREDICTED: 75
FVC_PERCENT_PREDICTED: 70
FEV1/FVC_PERCENT_PREDICTED: 95
FVC_PREDICTED: 2.9
FEV1/FVC_PERCENT_PREDICTED: 96
FEV1/FVC_PERCENT_PREDICTED: 95
FEV1_PREDICTED: 2.19
FEV1/FVC_PERCENT_PREDICTED: 76
FEV1/FVC_PERCENT_LLN: 63
FEV1: 1.48
FEV1_PERCENT_PREDICTED: 67
FEV1_PERCENT_CHANGE: 6
FEV1_LLN: 1.83
FEV1: 1.57
FEV1_PERCENT_CHANGE: 7
FEV1/FVC_PERCENT_CHANGE: 86
FEV1/FVC: 72
FEV1/FVC: 73
FEV1_PERCENT_PREDICTED: 71
FEV1/FVC_PREDICTED: 76
FVC: 2.18
FVC: 2.03
FVC_LLN: 2.42
FEV1/FVC: 72.02
FEV1/FVC_PERCENT_CHANGE: -1

## 2019-03-25 NOTE — PROCEDURES
Good patient effort & cooperation.  The results of this test meet the ATS/ERS standards for acceptability and repeatability.  Predicted equations for Spirometry are N-Sacha II, ITS for lung volumes, and [facility] for DLCO.  The DLCO was uncorrected for Hgb.  A bronchodilator of Ventolin HFA- 2puffs via spacer were administered.  DLCO was performed during dilation period.    The FVC is 2.18 L or 75%, FEV1 is 1.57 L or 71%, FEV1/FVC 72%.  TLC of 112%.  DLCO 118%.  No significant bronchodilator response by ATS guidelines.    Interpretation:  PFTs show moderately severe obstructive ventilatory defect, FEV1: 1.57 L or 71%.  Normal gas transfer.  The lack of bronchodilator response does not preclude using inhalers when clinically indicated.

## 2019-03-29 ENCOUNTER — PATIENT MESSAGE (OUTPATIENT)
Dept: MEDICAL GROUP | Facility: PHYSICIAN GROUP | Age: 69
End: 2019-03-29

## 2019-03-29 RX ORDER — TIOTROPIUM BROMIDE 18 UG/1
18 CAPSULE ORAL; RESPIRATORY (INHALATION) DAILY
Qty: 30 CAP | Refills: 0 | Status: SHIPPED | OUTPATIENT
Start: 2019-03-29 | End: 2019-04-12 | Stop reason: SDUPTHER

## 2019-04-02 PROBLEM — Z87.891 FORMER SMOKER: Status: ACTIVE | Noted: 2019-04-02

## 2019-04-02 PROBLEM — Z92.29 UP TO DATE WITH INFLUENZA VACCINATION: Status: ACTIVE | Noted: 2019-04-02
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
## 2019-04-03 ENCOUNTER — SLEEP CENTER VISIT (OUTPATIENT)
Dept: SLEEP MEDICINE | Facility: MEDICAL CENTER | Age: 69
End: 2019-04-03
Payer: MEDICARE

## 2019-04-03 VITALS
WEIGHT: 173 LBS | OXYGEN SATURATION: 96 % | RESPIRATION RATE: 14 BRPM | HEART RATE: 75 BPM | HEIGHT: 60 IN | BODY MASS INDEX: 33.96 KG/M2 | DIASTOLIC BLOOD PRESSURE: 82 MMHG | SYSTOLIC BLOOD PRESSURE: 138 MMHG

## 2019-04-03 DIAGNOSIS — G47.33 OBSTRUCTIVE SLEEP APNEA SYNDROME: ICD-10-CM

## 2019-04-03 DIAGNOSIS — Z92.29 UP TO DATE WITH INFLUENZA VACCINATION: ICD-10-CM

## 2019-04-03 DIAGNOSIS — Z87.891 FORMER SMOKER: ICD-10-CM

## 2019-04-03 PROCEDURE — 99204 OFFICE O/P NEW MOD 45 MIN: CPT | Performed by: INTERNAL MEDICINE

## 2019-04-03 RX ORDER — ZOLPIDEM TARTRATE 5 MG/1
5 TABLET ORAL NIGHTLY PRN
Qty: 3 TAB | Refills: 0 | Status: SHIPPED | OUTPATIENT
Start: 2019-04-03 | End: 2019-04-10

## 2019-04-05 ENCOUNTER — HOSPITAL ENCOUNTER (OUTPATIENT)
Dept: RADIOLOGY | Facility: MEDICAL CENTER | Age: 69
End: 2019-04-05
Attending: NURSE PRACTITIONER
Payer: MEDICARE

## 2019-04-05 DIAGNOSIS — Z12.31 VISIT FOR SCREENING MAMMOGRAM: ICD-10-CM

## 2019-04-05 PROCEDURE — 77063 BREAST TOMOSYNTHESIS BI: CPT

## 2019-04-09 ENCOUNTER — OFFICE VISIT (OUTPATIENT)
Dept: MEDICAL GROUP | Facility: PHYSICIAN GROUP | Age: 69
End: 2019-04-09
Payer: MEDICARE

## 2019-04-09 VITALS
DIASTOLIC BLOOD PRESSURE: 84 MMHG | HEART RATE: 75 BPM | WEIGHT: 165 LBS | OXYGEN SATURATION: 96 % | TEMPERATURE: 98.1 F | BODY MASS INDEX: 32.39 KG/M2 | HEIGHT: 60 IN | SYSTOLIC BLOOD PRESSURE: 132 MMHG

## 2019-04-09 DIAGNOSIS — G47.33 OBSTRUCTIVE SLEEP APNEA SYNDROME: ICD-10-CM

## 2019-04-09 DIAGNOSIS — R05.8 COUGH WITH SPUTUM: ICD-10-CM

## 2019-04-09 DIAGNOSIS — Z87.891 FORMER SMOKER: ICD-10-CM

## 2019-04-09 DIAGNOSIS — J44.9 MILD CHRONIC OBSTRUCTIVE PULMONARY DISEASE (HCC): ICD-10-CM

## 2019-04-09 DIAGNOSIS — R09.89 CHRONIC SINUS COMPLAINTS: ICD-10-CM

## 2019-04-09 DIAGNOSIS — R91.1 RIGHT UPPER LOBE PULMONARY NODULE: ICD-10-CM

## 2019-04-09 PROCEDURE — 99214 OFFICE O/P EST MOD 30 MIN: CPT | Performed by: NURSE PRACTITIONER

## 2019-04-09 RX ORDER — BUDESONIDE AND FORMOTEROL FUMARATE DIHYDRATE 160; 4.5 UG/1; UG/1
2 AEROSOL RESPIRATORY (INHALATION) 2 TIMES DAILY
Qty: 2 INHALER | Refills: 11 | Status: SHIPPED | OUTPATIENT
Start: 2019-04-09 | End: 2019-10-07

## 2019-04-09 RX ORDER — ALBUTEROL SULFATE 90 UG/1
2 AEROSOL, METERED RESPIRATORY (INHALATION) EVERY 6 HOURS PRN
Qty: 8.5 G | Refills: 3 | Status: SHIPPED | OUTPATIENT
Start: 2019-04-09

## 2019-04-09 NOTE — PROGRESS NOTES
Subjective:     Chief Complaint   Patient presents with   • COPD       HPI  Kayla Jensen is a 68 y.o. female here today for follow-up on chronic cough and MADISON.     Chronic sinus complaints  This is an ongoing issue.  She has an appointment in July with a specialist, but she is having ongoing cough with sputum and she feels like it is coming from her sinuses.  She would like a referral to a different ENT where she may possibly be able to be seen sooner    Mild chronic obstructive pulmonary disease (HCC)  Pulmonary function test confirmed borderline COPD diagnosis FVC/FEV1 ratio is just above at 73%, FEV1 67%.  I had started her on Spiriva (cost her $260), which she feels like has opened up her lungs more to allow her breathing to be improved.  She does continue to have ongoing cough with nonpurulent sputum.  Denies any wheezing or hemoptysis.     She was previously part of the lung cancer screening program and this revealed subpleural pulmonary nodules. She never had f/u because she has quit smoking >15 years ago.     Diagnoses of Mild chronic obstructive pulmonary disease (HCC), Cough with sputum, Chronic sinus complaints, Right upper lobe pulmonary nodule, Obstructive sleep apnea syndrome, and Former smoker were pertinent to this visit.    Allergies: Aspirin; Ibuprofen; and Tape  Current medicines (including changes today)  Current Outpatient Prescriptions   Medication Sig Dispense Refill   • budesonide-formoterol (SYMBICORT) 160-4.5 MCG/ACT Aerosol Inhale 2 Puffs by mouth 2 Times a Day. 2 Inhaler 11   • albuterol (PROAIR HFA) 108 (90 Base) MCG/ACT Aero Soln inhalation aerosol Inhale 2 Puffs by mouth every 6 hours as needed for Shortness of Breath. 8.5 g 3   • Cholecalciferol (VITAMIN D3) 5000 units Cap Take 1 Cap by mouth every day.     • tiotropium (SPIRIVA) 18 MCG Cap Inhale 1 Cap by mouth every day. 30 Cap 0   • [START ON 4/30/2019] Acetaminophen-Codeine (TYLENOL/CODEINE #3) 300-30 MG Tab Take 1-2  Tabs by mouth 1 time daily as needed (severe pain at bedtime only) for up to 30 days. 60 Tab 0   • [START ON 4/30/2019] tramadol (ULTRAM) 50 MG Tab Take 1-2 Tabs by mouth 1 time daily as needed for Severe Pain (6 hours apart from Tylenol #3) for up to 30 days. 60 Tab 0   • gabapentin (NEURONTIN) 300 MG Cap Take 3 Caps by mouth 2 Times a Day. 540 Cap 3   • fexofenadine-pseudoephedrine (ALLEGRA-D)  MG per tablet TAKE 1 TABLET BY MOUTH 2 TIMES A DAY. 60 Tab 3   • Melatonin 1 MG Tab Take  by mouth every day.     • eucalyptus/peppermint oil (PONARIS NASAL) Solution Spray 10 Drops in nose every 6 hours as needed. 1 Bottle 2     No current facility-administered medications for this visit.        She  has a past medical history of Actinic keratoses (11/6/2018); Allergic rhinitis; Allergy; Anesthesia (07/2018); Arthritis (07/2018); Back pain; Breath shortness (07/2018); Bronchitis; Cataract; Chickenpox; Dental disorder; Emphysema of lung (HCC) (07/2018); GERD (gastroesophageal reflux disease); [place]uv[place] measles; Heart burn; Influenza; Lentigo (11/6/2018); Nasal drainage; Neoplasm of uncertain behavior of skin of cheek (9/25/2018); Neoplasm of uncertain behavior of skin of chest (11/6/2018); Personal history of venous thrombosis and embolism (1982); Pneumonia; Renal disorder; Skin lesion of right lower extremity (9/25/2018); Sleep apnea (07/2018); and Unspecified hemorrhagic conditions.        ROS  As stated in HPI and additionally  Gen: +fatigue. No fever or chills, or significant weight loss  HEENT: +chronic sinus pressure and thick sputum in mornings  CV: +MADISON improving. No chest pain or LE edema  Pulm: see hpi        Objective:     /84 (BP Location: Right arm, Patient Position: Sitting, BP Cuff Size: Adult)   Pulse 75   Temp 36.7 °C (98.1 °F) (Temporal)   Ht 1.524 m (5')   Wt 74.8 kg (165 lb)   SpO2 96%  Body mass index is 32.22 kg/m².  Physical Exam:  General: Alert, oriented, in no acute distress.  Eye  contact is good, speech goal directed, affect calm  CNs grossly intact.  HEENT: conjunctiva non-injected, sclera non-icteric, EOMs intact. PERRL. No lid edema or eye drainage.   Gross hearing intact.  Nasal turbinates without edema or drainage.   Oral mucous membranes pink and moist with no lesions. Oropharynx without erythema, or exudate.   Neck: Supple. No adenopathy or masses in the cervical or supraclavicular regions.   Lungs: unlabored. clear to auscultation bilaterally with good excursion.  CV: regular rate and rhythm.  Ext: no edema, normal color and temperature.   Skin: No rashes or lesions in visible areas  Gait steady.     Assessment and Plan:   Assessment/Plan:  1. Mild chronic obstructive pulmonary disease (HCC)  Improved with Spiriva, though cough continues. spiriva too expensive. Switch to Symbicort.   - CULTURE RESPIRATORY W/ [facility]  - budesonide-formoterol (SYMBICORT) 160-4.5 MCG/ACT Aerosol; Inhale 2 Puffs by mouth 2 Times a Day.  Dispense: 2 Inhaler; Refill: 11  - albuterol (PROAIR HFA) 108 (90 Base) MCG/ACT Aero Soln inhalation aerosol; Inhale 2 Puffs by mouth every 6 hours as needed for Shortness of Breath.  Dispense: 8.5 g; Refill: 3  - CT-CHEST (THORAX) W/O; Future    2. Cough with sputum  Patient requesting culture. I feel this is appropriate   - CULTURE RESPIRATORY W/ [facility]    3. Chronic sinus complaints  - REFERRAL TO ENT    4. Right upper lobe pulmonary nodule  - CT-CHEST (THORAX) W/O; Future    5. Obstructive sleep apnea syndrome  - REFERRAL TO ENT    6. Former smoker  - CT-CHEST (THORAX) W/O; Future       Follow up:  Return in about 3 months (around 7/9/2019).    Educated in proper administration of medication(s) ordered today including safety, possible SE, risks, benefits, rationale and alternatives to therapy.   Supportive care, differential diagnoses, and indications for immediate follow-up discussed with patient.    Pathogenesis of diagnosis discussed including typical length and  natural progression.    Instructed to return to clinic or nearest emergency department for any change in condition, further concerns, or worsening of symptoms.  Patient states understanding of the plan of care and discharge instructions.      Please note that this dictation was created using voice recognition software. I have made every reasonable attempt to correct obvious errors, but I expect that there are errors of grammar and possibly content that I did not discover before finalizing the note.    Followup: Return in about 3 months (around 7/9/2019). sooner should new symptoms or problems arise.

## 2019-04-10 ENCOUNTER — PATIENT MESSAGE (OUTPATIENT)
Dept: MEDICAL GROUP | Facility: PHYSICIAN GROUP | Age: 69
End: 2019-04-10

## 2019-04-10 PROBLEM — J44.9 MILD CHRONIC OBSTRUCTIVE PULMONARY DISEASE (HCC): Status: ACTIVE | Noted: 2019-04-10

## 2019-04-10 NOTE — ASSESSMENT & PLAN NOTE
This is an ongoing issue.  She has an appointment in July with a specialist, but she is having ongoing cough with sputum and she feels like it is coming from her sinuses.  She would like a referral to a different ENT where she may possibly be able to be seen sooner

## 2019-04-10 NOTE — ASSESSMENT & PLAN NOTE
Pulmonary function test confirmed borderline COPD diagnosis FVC/FEV1 ratio is just above at 73%, FEV1 67%.  I had started her on Spiriva (cost her $260), which she feels like has opened up her lungs more to allow her breathing to be improved.  She does continue to have ongoing cough with nonpurulent sputum.  Denies any wheezing or hemoptysis.

## 2019-04-11 ENCOUNTER — PATIENT MESSAGE (OUTPATIENT)
Dept: MEDICAL GROUP | Facility: PHYSICIAN GROUP | Age: 69
End: 2019-04-11

## 2019-04-12 RX ORDER — TIOTROPIUM BROMIDE 18 UG/1
18 CAPSULE ORAL; RESPIRATORY (INHALATION) DAILY
Qty: 30 CAP | Refills: 5 | Status: SHIPPED | OUTPATIENT
Start: 2019-04-12 | End: 2019-07-15 | Stop reason: SDUPTHER

## 2019-04-23 ENCOUNTER — SLEEP STUDY (OUTPATIENT)
Dept: SLEEP MEDICINE | Facility: MEDICAL CENTER | Age: 69
End: 2019-04-23
Attending: INTERNAL MEDICINE
Payer: MEDICARE

## 2019-04-23 DIAGNOSIS — G47.33 OBSTRUCTIVE SLEEP APNEA SYNDROME: ICD-10-CM

## 2019-04-23 PROCEDURE — 95811 POLYSOM 6/>YRS CPAP 4/> PARM: CPT | Performed by: FAMILY MEDICINE

## 2019-04-24 NOTE — PROCEDURES
Technical summary: The patient underwent a split-night polysomnogram. This was a 16 channel montage study to include a 6 channel EEG, a 2 channel EOG, and chin EMG, left and right leg EMG, a snore channel, a nasal pressure transducer, and a nasal oral airflow semester and a CFLOW pressure transducer.   Respiratory effort was assessed with the use of a thoracic and abdominal monitor and overnight oximetry was obtained. Audio and video recordings were reviewed. This was a fully attended study and sleep stage scoring was performed. The test was technically adequate.    Scoring Criteria: A modification of the the AASM Manual for the Scoring of Sleep and Associated Events, 2012, was used.   Obstructive apnea was scored by cessation of airflow for at least 10 seconds with continuing respiratory effort.  Central apnea was scored by cessation of airflow for at least 10 seconds with no effort.  Hypopnea was scored by a 30% or more reduction in airflow for at least 10 seconds accompanied by an arterial oxygen desaturation of 3% or more.  (For Medicare patients, hypopneas were scored by a 30% or more reduction in airflow for at least 10 seconds accompanied by an arterial oxygen saturation of 4% or more, as required by their insurance, CMS.    Interpretation:  Study start time was 09:25:02 PM.  Total recording time was 3h 24.5m (204 minutes) with a total sleep time of 2h 49.0m (169 minutes) resulting in a sleep efficiency of 82.64%.  Sleep latency from the start fo the study was 11 minutes minutes and REM latency from sleep onset was 00 minutes minutes.    Respiratory:   There were 18 apneas in total consisting of 18 obstructive apneas, 0 mixed apneas, and 0 central apneas.  There were 40 hypopneas in total.  The apnea index was 6.39 per hour and the hypopnea index was 14.20 per hour.  The overall AHI was 20.6, with a REM AHI of 0.00, and a supine AHI of 0.00.    Limb Movements:  There were a total of 13 periodic leg movements,  of which 4 were PLMS arousals.  This resulted in a PLMS index of 4.6 and a PLMS arousal index of 1.4    Oximetry:  The mean SaO2 was 88.0% for the diagnostic portion of the study, with a minimum SaO2 of 80.0%.    Treatment:    Interpretation:  Treatment recording time was 4h 39.5m (279 minutes) with a total sleep time of 4h 22.5m (262 minutes) resulting in a sleep efficiency of 93.9%.    Sleep latency from the start of treatment was 00 minutes minutes and REM latency from sleep onset was 1h 11.5m minutes.    The patient had 53 arousals in total for an arousal index of 12.1.    Respiratory:   There were 4 apneas in total consisting of  3 obstructive apneas, 1 central apneas, and 0 mixed apneas for an apnea index of 0.91.    The patient had 23 hypopneas in total, which resulted in a hypopnea index of 5.26.    The overall AHI was 6.17, with a REM AHI of 0.94, and a supine AHI of 0.00.       Limb Movements:  There were a total of 118 periodic leg movements, of which 10 were PLMS arousals.  This resulted in a PLMS index of 27.0 and a PLMS arousal index of 2.3.    Oximetry:  The mean SaO2 during treatment was 90.0%, with a minimum oxygen saturation of 84.0%.    CPAP was tried from 5cm to 9cm H2O.    Impression:  1.  Moderate obstructive sleep apnea with AHI of 20.6/hr and O2 kaushal 80 %. Due to severity of the disease she met the split study protocol. The titration started with CPAP 5 cm and the best tolerated was CPAP 8 cm. The AHI improved to 2.3/hr with improved O2 kaushal of 85% and average O2 saturation of 91 %. Non supine sleep was observed on this pressure    2.  PLMS  3.  Sleep related hypoxia    Recommendations:  I recommend CPAP of 8 cm with small dreamwear mask. Consider supplemental O2 bleed in and f/u with OPO on the recommended pressure due to residual sleep hypoxia. I also recommend 30 day compliance download to assess the efficacy to the recommended pressure, measure leak, apnea hypopnea index and compliance  for further outpatient monitoring and management of CPAP therapy. In some cases alternative treatment options may prove effective in resolving sleep apnea and these options include upper airway surgery, the use of a dental orthotic or weight loss and positional therapy. Clinical correlation is required. In general patients with sleep apnea are advised to avoid alcohol and sedatives and to not operate a motor vehicle while drowsy and are at a greater risk for cardiovascular disease.

## 2019-05-16 ENCOUNTER — SLEEP CENTER VISIT (OUTPATIENT)
Dept: SLEEP MEDICINE | Facility: MEDICAL CENTER | Age: 69
End: 2019-05-16
Payer: MEDICARE

## 2019-05-16 VITALS
WEIGHT: 170 LBS | SYSTOLIC BLOOD PRESSURE: 140 MMHG | BODY MASS INDEX: 33.38 KG/M2 | HEIGHT: 60 IN | DIASTOLIC BLOOD PRESSURE: 80 MMHG | RESPIRATION RATE: 16 BRPM | OXYGEN SATURATION: 92 % | HEART RATE: 78 BPM

## 2019-05-16 DIAGNOSIS — Z87.891 FORMER SMOKER: ICD-10-CM

## 2019-05-16 DIAGNOSIS — Z79.891 CHRONIC USE OF OPIATE DRUGS THERAPEUTIC PURPOSES: ICD-10-CM

## 2019-05-16 DIAGNOSIS — R09.89 CHRONIC SINUS COMPLAINTS: ICD-10-CM

## 2019-05-16 DIAGNOSIS — G47.33 OSA (OBSTRUCTIVE SLEEP APNEA): ICD-10-CM

## 2019-05-16 PROBLEM — G47.30 SLEEP APNEA SYNDROME: Status: RESOLVED | Noted: 2018-11-30 | Resolved: 2019-05-16

## 2019-05-16 PROCEDURE — 99214 OFFICE O/P EST MOD 30 MIN: CPT | Performed by: NURSE PRACTITIONER
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
## 2019-05-16 NOTE — PATIENT INSTRUCTIONS
1) Start CPAP at 8cmH20  2) Discussed acclimating to machine and change mask within first 30 days if required. Bring machine to first appointment.  3) Light conditioning encouraged  4) Continue smoking cessation   4) Vaccines: Up to date with Prevnar 13 and Pneumovax 23  5) Return in about 2 months (around 7/16/2019) for follow up with EMIR Velazquez, if not sooner, review of symptoms, Compliance.

## 2019-05-17 ENCOUNTER — HOSPITAL ENCOUNTER (OUTPATIENT)
Dept: RADIOLOGY | Facility: MEDICAL CENTER | Age: 69
End: 2019-05-17
Attending: NURSE PRACTITIONER
Payer: MEDICARE

## 2019-05-17 DIAGNOSIS — J44.9 MILD CHRONIC OBSTRUCTIVE PULMONARY DISEASE (HCC): ICD-10-CM

## 2019-05-17 DIAGNOSIS — Z87.891 FORMER SMOKER: ICD-10-CM

## 2019-05-17 DIAGNOSIS — R91.1 RIGHT UPPER LOBE PULMONARY NODULE: ICD-10-CM

## 2019-05-17 PROCEDURE — 71250 CT THORAX DX C-: CPT

## 2019-05-23 ENCOUNTER — TELEPHONE (OUTPATIENT)
Dept: MEDICAL GROUP | Facility: PHYSICIAN GROUP | Age: 69
End: 2019-05-23

## 2019-05-23 NOTE — TELEPHONE ENCOUNTER
Future Appointments       Provider Department Center    5/24/2019 7:40 AM KATY Mcfarlane [facility]    7/17/2019 7:40 AM KATY Sahni [facility]         ESTABLISHED PATIENT PRE-VISIT PLANNING     Patient was NOT contacted to complete PVP.     1.  Reviewed notes from the last few office visits within the medical group: Yes    2.  If any orders were placed at last visit or intended to be done for this visit (i.e. 6 mos follow-up), do we have Results/Consult Notes?        •  Labs - Labs were not ordered at last office visit.       •  Imaging - Imaging was not ordered at last office visit.       •  Referrals - Referral ordered, patient has NOT been seen.    3. Is this appointment scheduled as a Hospital Follow-Up? No    4.  Immunizations were updated in [employer] using WebIZ?: Yes       •  Web Iz Recommendations: TD, VARICELLA (Chicken Pox)  and SHINGRIX (Shingles)    5.  Patient is due for the following Health Maintenance Topics:   Health Maintenance Due   Topic Date Due   • IMM ZOSTER VACCINES (2 of 3) 01/24/2017   • URINE DRUG SCREEN  05/09/2019       6. Orders for overdue Health Maintenance topics pended in Pre-Charting? N\A    7.  AHA (MDX) form printed for Provider? No, already completed    8.  Patient was NOT informed to arrive 15 min prior to their scheduled appointment and bring in their medication bottles.

## 2019-05-24 ENCOUNTER — OFFICE VISIT (OUTPATIENT)
Dept: MEDICAL GROUP | Facility: PHYSICIAN GROUP | Age: 69
End: 2019-05-24
Payer: MEDICARE

## 2019-05-24 VITALS
SYSTOLIC BLOOD PRESSURE: 118 MMHG | TEMPERATURE: 98.3 F | WEIGHT: 168 LBS | HEART RATE: 78 BPM | BODY MASS INDEX: 32.98 KG/M2 | DIASTOLIC BLOOD PRESSURE: 78 MMHG | HEIGHT: 60 IN | OXYGEN SATURATION: 96 %

## 2019-05-24 DIAGNOSIS — I71.21 ASCENDING AORTIC ANEURYSM (HCC): ICD-10-CM

## 2019-05-24 DIAGNOSIS — M54.42 CHRONIC LEFT-SIDED LOW BACK PAIN WITH LEFT-SIDED SCIATICA: ICD-10-CM

## 2019-05-24 DIAGNOSIS — R06.09 DOE (DYSPNEA ON EXERTION): ICD-10-CM

## 2019-05-24 DIAGNOSIS — Z79.891 CHRONIC USE OF OPIATE DRUGS THERAPEUTIC PURPOSES: ICD-10-CM

## 2019-05-24 DIAGNOSIS — G89.29 CHRONIC LEFT-SIDED LOW BACK PAIN WITH LEFT-SIDED SCIATICA: ICD-10-CM

## 2019-05-24 DIAGNOSIS — I31.39 PERICARDIAL EFFUSION: ICD-10-CM

## 2019-05-24 PROBLEM — Z92.29 UP TO DATE WITH INFLUENZA VACCINATION: Status: RESOLVED | Noted: 2019-04-02 | Resolved: 2019-05-24

## 2019-05-24 PROCEDURE — 99214 OFFICE O/P EST MOD 30 MIN: CPT | Performed by: NURSE PRACTITIONER

## 2019-05-24 RX ORDER — TRAMADOL HYDROCHLORIDE 50 MG/1
50-100 TABLET ORAL
Qty: 60 TAB | Refills: 0 | Status: SHIPPED | OUTPATIENT
Start: 2019-05-24 | End: 2019-05-24 | Stop reason: SDUPTHER

## 2019-05-24 RX ORDER — ACETAMINOPHEN AND CODEINE PHOSPHATE 300; 30 MG/1; MG/1
1-2 TABLET ORAL
Qty: 60 TAB | Refills: 0 | Status: SHIPPED | OUTPATIENT
Start: 2019-08-12 | End: 2019-06-25

## 2019-05-24 RX ORDER — TRAMADOL HYDROCHLORIDE 50 MG/1
50-100 TABLET ORAL
Qty: 60 TAB | Refills: 0 | Status: SHIPPED | OUTPATIENT
Start: 2019-06-23 | End: 2019-05-24 | Stop reason: SDUPTHER

## 2019-05-24 RX ORDER — TRAMADOL HYDROCHLORIDE 50 MG/1
50-100 TABLET ORAL
Qty: 60 TAB | Refills: 0 | Status: SHIPPED | OUTPATIENT
Start: 2019-07-23 | End: 2019-05-24 | Stop reason: SDUPTHER

## 2019-05-24 RX ORDER — ACETAMINOPHEN AND CODEINE PHOSPHATE 300; 30 MG/1; MG/1
1-2 TABLET ORAL
Qty: 60 TAB | Refills: 0 | Status: SHIPPED | OUTPATIENT
Start: 2019-06-13 | End: 2019-05-24 | Stop reason: SDUPTHER

## 2019-05-24 RX ORDER — TRAMADOL HYDROCHLORIDE 50 MG/1
50-100 TABLET ORAL
Qty: 60 TAB | Refills: 0 | Status: SHIPPED | OUTPATIENT
Start: 2019-08-22 | End: 2019-10-07 | Stop reason: SDUPTHER

## 2019-05-24 RX ORDER — ACETAMINOPHEN AND CODEINE PHOSPHATE 300; 30 MG/1; MG/1
1-2 TABLET ORAL
Qty: 60 TAB | Refills: 0 | Status: SHIPPED | OUTPATIENT
Start: 2019-07-13 | End: 2019-05-24 | Stop reason: SDUPTHER

## 2019-05-24 ASSESSMENT — PATIENT HEALTH QUESTIONNAIRE - PHQ9: CLINICAL INTERPRETATION OF PHQ2 SCORE: 0

## 2019-05-24 NOTE — ASSESSMENT & PLAN NOTE
Last dose of narcotic medication: this morning  Analgesia: 3/10  Activity level:   Adverse events: none  Aberrant behavior: none  Affect and mood: calm and pleasant  Nonnarcotic treatments that are being used: gabapentin, epidurals  Most recent urine drug screen done 5/2018, and is consistent with prescribed medications  Opiate risk score: 0  Total daily opiate dosage: morphine 9.5 mEq daily

## 2019-05-24 NOTE — ASSESSMENT & PLAN NOTE
This is a new problem found on CT scan.  There is small amount of anterior pericardial effusion.  Patient has been experiencing new onset dyspnea on exertion over this past year.  We began to work it up over this past 6 months.  EKG in March showed no acute coronary syndrome or previous heart attack or ischemia.  Pulmonary function tests showed mild COPD.  CT scan was completed for monitoring of pulmonary nodules and ongoing dyspnea on exertion, with this new finding.  Patient has not had any hypotension, lightheadedness or syncope, chest pain, tachycardia, swelling in the legs, wheezing, or confusion.  In regards to malignancy work-up, she had normal mammogram last month and CT shows no signs of any lung cancer.  Physical exam has revealed no friction rub.  TSH has been normal.  BMP has been normal.  She has not had any fevers.

## 2019-05-24 NOTE — PROGRESS NOTES
Subjective:     Chief Complaint   Patient presents with   • Results     ct scan       HPI  Kayla Jensen is a 68 y.o. female here today for CT scan review     Ascending aortic aneurysm (HCC)  New problem found on CT this month. Size is 4.6 cm    Chronic left-sided low back pain with left-sided sciatica  Ongoing chronic problem with pain in lower back radiating in left leg and foot. The pain is worsening over time this past year. Treated with gabapentin, Tramadol in am, Tylenol #3 in pm only (because it causes drowsiness), lidocaine patches were not covered by insurance, and also procedural management with Dr. Araiza. She just had epidural in May 2018    Chronic use of opiate drugs therapeutic purposes  Last dose of narcotic medication: this morning  Analgesia: 3/10  Activity level:   Adverse events: none  Aberrant behavior: none  Affect and mood: calm and pleasant  Nonnarcotic treatments that are being used: gabapentin, epidurals  Most recent urine drug screen done 5/2018, and is consistent with prescribed medications  Opiate risk score: 0  Total daily opiate dosage: morphine 9.5 mEq daily               Pericardial effusion  This is a new problem found on CT scan.  There is small amount of anterior pericardial effusion.  Patient has been experiencing new onset dyspnea on exertion over this past year.  We began to work it up over this past 6 months.  EKG in March showed no acute coronary syndrome or previous heart attack or ischemia.  Pulmonary function tests showed mild COPD.  CT scan was completed for monitoring of pulmonary nodules and ongoing dyspnea on exertion, with this new finding.  Patient has not had any hypotension, lightheadedness or syncope, chest pain, tachycardia, swelling in the legs, wheezing, or confusion.  In regards to malignancy work-up, she had normal mammogram last month and CT shows no signs of any lung cancer.  Physical exam has revealed no friction rub.  TSH has been normal.  BMP  has been normal.  She has not had any fevers.       Diagnoses of Pericardial effusion, Ascending aortic aneurysm (HCC), MADISON (dyspnea on exertion), Chronic left-sided low back pain with left-sided sciatica, and Chronic use of opiate drugs therapeutic purposes were pertinent to this visit.    Allergies: Aspirin; Ibuprofen; and Tape  Current medicines (including changes today)  Current Outpatient Prescriptions   Medication Sig Dispense Refill   • [START ON 8/12/2019] Acetaminophen-Codeine (TYLENOL/CODEINE #3) 300-30 MG Tab Take 1-2 Tabs by mouth 1 time daily as needed (severe pain at bedtime only) for up to 30 days. 60 Tab 0   • [START ON 8/22/2019] tramadol (ULTRAM) 50 MG Tab Take 1-2 Tabs by mouth 1 time daily as needed for Severe Pain (6 hours apart from Tylenol #3) for up to 30 days. 60 Tab 0   • tiotropium (SPIRIVA) 18 MCG Cap Inhale 1 Cap by mouth every day. 30 Cap 5   • budesonide-formoterol (SYMBICORT) 160-4.5 MCG/ACT Aerosol Inhale 2 Puffs by mouth 2 Times a Day. 2 Inhaler 11   • albuterol (PROAIR HFA) 108 (90 Base) MCG/ACT Aero Soln inhalation aerosol Inhale 2 Puffs by mouth every 6 hours as needed for Shortness of Breath. 8.5 g 3   • Cholecalciferol (VITAMIN D3) 5000 units Cap Take 1 Cap by mouth every day.     • gabapentin (NEURONTIN) 300 MG Cap Take 3 Caps by mouth 2 Times a Day. 540 Cap 3   • fexofenadine-pseudoephedrine (ALLEGRA-D)  MG per tablet TAKE 1 TABLET BY MOUTH 2 TIMES A DAY. 60 Tab 3   • eucalyptus/peppermint oil (PONARIS NASAL) Solution Spray 10 Drops in nose every 6 hours as needed. 1 Bottle 2     No current facility-administered medications for this visit.        She  has a past medical history of Actinic keratoses (11/6/2018); Allergic rhinitis; Allergy; Anesthesia (07/2018); Arthritis (07/2018); Back pain; Breath shortness (07/2018); Bronchitis; Cataract; Chickenpox; Dental disorder; Emphysema of lung (HCC) (07/2018); GERD (gastroesophageal reflux disease); Citizen of [place] measles; Heart burn;  Influenza; Lentigo (11/6/2018); Nasal drainage; Neoplasm of uncertain behavior of skin of cheek (9/25/2018); Neoplasm of uncertain behavior of skin of chest (11/6/2018); Personal history of venous thrombosis and embolism (1982); Pneumonia; Renal disorder; Skin lesion of right lower extremity (9/25/2018); Sleep apnea (07/2018); and Unspecified hemorrhagic conditions.        ROS  As stated in HPI and additionally  Gen: No F/C, weight loss, night sweats  CV: +MADISON. No chest pain, palpitations, rapid heart rate, syncope, LE edema  Pulm: No wheezing, sob at rest     Objective:     /78 (BP Location: Left arm, Patient Position: Sitting, BP Cuff Size: Adult)   Pulse 78   Temp 36.8 °C (98.3 °F) (Temporal)   Ht 1.524 m (5')   Wt 76.2 kg (168 lb)   SpO2 96%  Body mass index is 32.81 kg/m².  Physical Exam:  General: Alert, oriented, in no acute distress.  Eye contact is good, speech goal directed, affect calm  CNs grossly intact.  HEENT: conjunctiva non-injected, sclera non-icteric, EOMs intact. No lid edema or eye drainage.   Gross hearing intact.  Neck: Supple. No adenopathy or masses in the cervical or supraclavicular regions.  Lungs: unlabored. clear to auscultation bilaterally with good excursion.  CV: regular rate and rhythm. No murmurs.  Ext: no edema, normal color and temperature.   Skin: No rashes or lesions in visible areas  Gait steady.     Assessment and Plan:   Assessment/Plan:  1. Pericardial effusion  Etiology unk. Check echo for further eval. Refer to Dr. Farr   - REFERRAL TO CARDIOLOGY  - EC-ECHOCARDIOGRAM COMPLETE W/O CONT; Future    2. Ascending aortic aneurysm (HCC)  Educated on dx. Strict bp control. Refer. Consider vascular referral after cardiology consult  - REFERRAL TO CARDIOLOGY  - EC-ECHOCARDIOGRAM COMPLETE W/O CONT; Future    3. MADISON (dyspnea on exertion)  Check echo. Refer for further pulmonary testing     4. Chronic left-sided low back pain with left-sided sciatica  Progressively  worsening. Continue f/u with [facility] and current pain regimen   - Acetaminophen-Codeine (TYLENOL/CODEINE #3) 300-30 MG Tab; Take 1-2 Tabs by mouth 1 time daily as needed (severe pain at bedtime only) for up to 30 days.  Dispense: 60 Tab; Refill: 0  - tramadol (ULTRAM) 50 MG Tab; Take 1-2 Tabs by mouth 1 time daily as needed for Severe Pain (6 hours apart from Tylenol #3) for up to 30 days.  Dispense: 60 Tab; Refill: 0    5. Chronic use of opiate drugs therapeutic purposes  Obtained and reviewed the patient utilization report state pharmacy database on 5/24/2019.  Based on assessment of report prescription for [Tramadol and Tylenol 3 medication] is medically necessary. Patient has not requested any early refills and exhibits no abberant behavior. I have advised patient to keep medication and safe place and to not drive, use alcohol, or take illicit drugs while taking this medication.       Follow up:  Return in about 6 weeks (around 7/5/2019).    Educated in proper administration of medication(s) ordered today including safety, possible SE, risks, benefits, rationale and alternatives to therapy.   Supportive care, differential diagnoses, and indications for immediate follow-up discussed with patient.    Pathogenesis of diagnosis discussed including typical length and natural progression.    Instructed to return to clinic or nearest emergency department for any change in condition, further concerns, or worsening of symptoms.  Patient states understanding of the plan of care and discharge instructions.      Please note that this dictation was created using voice recognition software. I have made every reasonable attempt to correct obvious errors, but I expect that there are errors of grammar and possibly content that I did not discover before finalizing the note.    Followup: Return in about 6 weeks (around 7/5/2019). sooner should new symptoms or problems arise.

## 2019-06-11 ENCOUNTER — HOSPITAL ENCOUNTER (OUTPATIENT)
Dept: CARDIOLOGY | Facility: MEDICAL CENTER | Age: 69
End: 2019-06-11
Attending: NURSE PRACTITIONER
Payer: MEDICARE

## 2019-06-11 DIAGNOSIS — I71.21 ASCENDING AORTIC ANEURYSM (HCC): ICD-10-CM

## 2019-06-11 DIAGNOSIS — I31.39 PERICARDIAL EFFUSION: ICD-10-CM

## 2019-06-11 LAB
LV EJECT FRACT  99904: 60
LV EJECT FRACT MOD 2C 99903: 63.39
LV EJECT FRACT MOD 4C 99902: 58.56
LV EJECT FRACT MOD BP 99901: 61.16

## 2019-06-11 PROCEDURE — 93306 TTE W/DOPPLER COMPLETE: CPT | Mod: 26 | Performed by: INTERNAL MEDICINE

## 2019-06-11 PROCEDURE — 93306 TTE W/DOPPLER COMPLETE: CPT

## 2019-06-25 ENCOUNTER — APPOINTMENT (OUTPATIENT)
Dept: RADIOLOGY | Facility: IMAGING CENTER | Age: 69
End: 2019-06-25
Attending: PHYSICIAN ASSISTANT
Payer: COMMERCIAL

## 2019-06-25 ENCOUNTER — OCCUPATIONAL MEDICINE (OUTPATIENT)
Dept: URGENT CARE | Facility: CLINIC | Age: 69
End: 2019-06-25
Payer: COMMERCIAL

## 2019-06-25 VITALS
TEMPERATURE: 98.3 F | SYSTOLIC BLOOD PRESSURE: 112 MMHG | DIASTOLIC BLOOD PRESSURE: 60 MMHG | OXYGEN SATURATION: 95 % | HEIGHT: 60 IN | HEART RATE: 73 BPM | WEIGHT: 168 LBS | RESPIRATION RATE: 16 BRPM | BODY MASS INDEX: 32.98 KG/M2

## 2019-06-25 DIAGNOSIS — M25.551 PAIN OF RIGHT HIP JOINT: ICD-10-CM
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
## 2019-06-25 DIAGNOSIS — S76.011A STRAIN OF RIGHT HIP, INITIAL ENCOUNTER: ICD-10-CM

## 2019-06-25 DIAGNOSIS — M54.6 ACUTE MIDLINE THORACIC BACK PAIN: ICD-10-CM
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
## 2019-06-25 LAB
AMP AMPHETAMINE: NORMAL
BREATH ALCOHOL COMMENT: NORMAL
COC COCAINE: NORMAL
INT CON NEG: NORMAL
INT CON POS: NORMAL
MET METHAMPHETAMINES: NORMAL
OPI OPIATES: NORMAL
PCP PHENCYCLIDINE: NORMAL
POC BREATHALIZER: 0 PERCENT (ref 0–0.01)
POC DRUG COMMENT 753798-OCCUPATIONAL HEALTH: NORMAL
THC: NORMAL

## 2019-06-25 PROCEDURE — 72070 X-RAY EXAM THORAC SPINE 2VWS: CPT | Mod: TC,29 | Performed by: PHYSICIAN ASSISTANT

## 2019-06-25 PROCEDURE — 82075 ASSAY OF BREATH ETHANOL: CPT | Mod: 29 | Performed by: PHYSICIAN ASSISTANT

## 2019-06-25 PROCEDURE — 99214 OFFICE O/P EST MOD 30 MIN: CPT | Mod: 29 | Performed by: PHYSICIAN ASSISTANT

## 2019-06-25 PROCEDURE — 73501 X-RAY EXAM HIP UNI 1 VIEW: CPT | Mod: TC,RT,29 | Performed by: PHYSICIAN ASSISTANT

## 2019-06-25 PROCEDURE — 80305 DRUG TEST PRSMV DIR OPT OBS: CPT | Mod: 29 | Performed by: PHYSICIAN ASSISTANT

## 2019-06-25 ASSESSMENT — ENCOUNTER SYMPTOMS
ABDOMINAL PAIN: 0
DIARRHEA: 0
COUGH: 0
SHORTNESS OF BREATH: 0
TINGLING: 0
FALLS: 1
FEVER: 0
CHILLS: 0
VOMITING: 0
CONSTIPATION: 0
BACK PAIN: 1
WEAKNESS: 0
NAUSEA: 0

## 2019-06-25 NOTE — PATIENT INSTRUCTIONS
Back Exercises  Back exercises help treat and prevent back injuries. The goal is to increase your strength in your belly (abdominal) and back muscles. These exercises can also help with flexibility. Start these exercises when told by your doctor.  HOME CARE  Back exercises include:  Pelvic Tilt.  · Lie on your back with your knees bent. Tilt your pelvis until the lower part of your back is against the floor. Hold this position 5 to 10 sec. Repeat this exercise 5 to 10 times.  Knee to Chest.  · Pull 1 knee up against your chest and hold for 20 to 30 seconds. Repeat this with the other knee. This may be done with the other leg straight or bent, whichever feels better. Then, pull both knees up against your chest.  Sit-Ups or Curl-Ups.  · Bend your knees 90 degrees. Start with tilting your pelvis, and do a partial, slow sit-up. Only lift your upper half 30 to 45 degrees off the floor. Take at least 2 to 3 seonds for each sit-up. Do not do sit-ups with your knees out straight. If partial sit-ups are difficult, simply do the above but with only tightening your belly (abdominal) muscles and holding it as told.  Hip-Lift.  · Lie on your back with your knees flexed 90 degrees. Push down with your feet and shoulders as you raise your hips 2 inches off the floor. Hold for 10 seconds, repeat 5 to 10 times.  Back Arches.  · Lie on your stomach. Prop yourself up on bent elbows. Slowly press on your hands, causing an arch in your low back. Repeat 3 to 5 times.  Shoulder-Lifts.  · Lie face down with arms beside your body. Keep hips and belly pressed to floor as you slowly lift your head and shoulders off the floor.  Do not overdo your exercises. Be careful in the beginning. Exercises may cause you some mild back discomfort. If the pain lasts for more than 15 minutes, stop the exercises until you see your doctor. Improvement with exercise for back problems is slow.      This information is not intended to replace advice given to you  by your health care provider. Make sure you discuss any questions you have with your health care provider.     Document Released: 01/20/2012 Document Revised: 03/11/2013 Document Reviewed: 02/11/2016  [employer] Interactive Patient Education ©2016 [employer]      Hip Exercises  Ask your health care provider which exercises are safe for you. Do exercises exactly as told by your health care provider and adjust them as directed. It is normal to feel mild stretching, pulling, tightness, or discomfort as you do these exercises, but you should stop right away if you feel sudden pain or your pain gets worse. Do not begin these exercises until told by your health care provider.  STRETCHING AND RANGE OF MOTION EXERCISES   These exercises warm up your muscles and joints and improve the movement and flexibility of your hip. These exercises also help to relieve pain, numbness, and tingling.  Exercise A: Hamstrings, Supine   1. Lie on your back.  2. Loop a belt or towel over the ball of your left / right foot. The ball of your foot is on the walking surface, right under your toes.  3. Straighten your left / right knee and slowly pull on the belt to raise your leg.  ¨ Do not let your left / right knee bend while you do this.  ¨ Keep your other leg flat on the floor.  ¨ Raise the left / right leg until you feel a gentle stretch behind your left / right knee or thigh.  4. Hold this position for __________ seconds.  5. Slowly return your leg to the starting position.  Repeat __________ times. Complete this stretch __________ times a day.  Exercise B: Hip Rotators   1. Lie on your back on a firm surface.  2. Hold your left / right knee with your left / right hand. Hold your ankle with your other hand.  3. Gently pull your left / right knee and rotate your lower leg toward your other shoulder.  ¨ Pull until you feel a stretch in your buttocks.  ¨ Keep your hips and shoulders firmly planted while you do this stretch.  4. Hold this  position for __________ seconds.  Repeat __________ times. Complete this stretch __________ times a day.  Exercise C: V-Sit (Hamstrings and Adductors)   1. Sit on the floor with your legs extended in a large “V” shape. Keep your knees straight during this exercise.  2. Start with your head and chest upright, then bend at your waist to reach for your left foot (position A). You should feel a stretch in your right inner thigh.  3. Hold this position for __________ seconds. Then slowly return to the upright position.  4. Bend at your waist to reach forward (position B). You should feel a stretch behind both of your thighs and knees.  5. Hold this position for __________ seconds. Then slowly return to the upright position.  6. Bend at your waist to reach for your right foot (position C). You should feel a stretch in your left inner thigh.  7. Hold this position for __________ seconds. Then slowly return to the upright position.  Repeat __________ times. Complete this stretch __________ times a day.  Exercise D: Lunge (Hip Flexors)   1. Place your left / right knee on the floor and bend your other knee so that is directly over your ankle. You should be half-kneeling.  2. Keep good posture with your head over your shoulders.  3. Tighten your buttocks to point your tailbone downward. This helps your back to keep from arching too much.  4. You should feel a gentle stretch in the front of your left / right thigh and hip. If you do not feel any resistance, slightly slide your other foot forward and then slowly lunge forward so your knee once again lines up over your ankle.  5. Make sure your tailbone continues to point downward.  6. Hold this position for __________ seconds.  Repeat __________ times. Complete this stretch __________ times a day.  STRENGTHENING EXERCISES   These exercises build strength and endurance in your hip. Endurance is the ability to use your muscles for a long time, even after they get tired.  Exercise  E: Bridge (Hip Extensors)   1. Lie on your back on a firm surface with your knees bent and your feet flat on the floor.  2. Tighten your buttocks muscles and lift your bottom off the floor until the trunk of your body is level with your thighs.  ¨ Do not arch your back.  ¨ You should feel the muscles working in your buttocks and the back of your thighs. If you do not feel these muscles, slide your feet 1-2 inches (2.5-5 cm) farther away from your buttocks.  3. Hold this position for __________ seconds.  4. Slowly lower your hips to the starting position.  5. Let your muscles relax completely between repetitions.  6. If this exercise is too easy, try doing it with your arms crossed over your chest.  Repeat __________ times. Complete this exercise __________ times a day.  Exercise F: Straight Leg Raises - Hip Abductors   1. Lie on your side with your left / right leg in the top position. Lie so your head, shoulder, knee, and hip line up with each other. You may bend your bottom knee to help you balance.  2. Roll your hips slightly forward, so your hips are stacked directly over each other and your left / right knee is facing forward.  3. Leading with your heel, lift your top leg 4-6 inches (10-15 cm). You should feel the muscles in your outer hip lifting.  ¨ Do not let your foot drift forward.  ¨ Do not let your knee roll toward the ceiling.  4. Hold this position for __________ seconds.  5. Slowly return to the starting position.  6. Let your muscles relax completely between repetitions.  Repeat __________ times. Complete this exercise __________ times a day.  Exercise G: Straight Leg Raises - Hip Adductors   1. Lie on your side with your left / right leg in the bottom position. Lie so your head, shoulder, knee, and hip line up. You may place your upper foot in front to help you balance.  2. Roll your hips slightly forward, so your hips are stacked directly over each other and your left / right knee is facing  "forward.  3. Tense the muscles in your inner thigh and lift your bottom leg 4-6 inches (10-15 cm).  4. Hold this position for __________ seconds.  5. Slowly return to the starting position.  6. Let your muscles relax completely between repetitions.  Repeat __________ times. Complete this exercise __________ times a day.  Exercise H: Straight Leg Raises - Quadriceps   1. Lie on your back with your left / right leg extended and your other knee bent.  2. Tense the muscles in the front of your left / right thigh. When you do this, you should see your kneecap slide up or see increased dimpling just above your knee.  3. Tighten these muscles even more and raise your leg 4-6 inches (10-15 cm) off the floor.  4. Hold this position for __________ seconds.  5. Keep these muscles tense as you lower your leg.  6. Relax the muscles slowly and completely between repetitions.  Repeat __________ times. Complete this exercise __________ times a day.  Exercise I: Hip Abductors, Standing  1. Tie one end of a rubber exercise band or tubing to a secure surface, such as a table or pole.  2. Loop the other end of the band or tubing around your left / right ankle.  3. Keeping your ankle with the band or tubing directly opposite of the secured end, step away until there is tension in the tubing or band. Hold onto a chair as needed for balance.  4. Lift your left / right leg out to your side. While you do this:  ¨ Keep your back upright.  ¨ Keep your shoulders over your hips.  ¨ Keep your toes pointing forward.  ¨ Make sure to use your hip muscles to lift your leg. Do not \"throw\" your leg or tip your body to lift your leg.  5. Hold this position for __________ seconds.  6. Slowly return to the starting position.  Repeat __________ times. Complete this exercise __________ times a day.  Exercise J: Squats (Quadriceps)  1.  a door frame so your feet and knees are in line with the frame. You may place your hands on the frame for " balance.  2. Slowly bend your knees and lower your hips like you are going to sit in a chair.  ¨ Keep your lower legs in a straight-up-and-down position.  ¨ Do not let your hips go lower than your knees.  ¨ Do not bend your knees lower than told by your health care provider.  ¨ If your hip pain increases, do not bend as low.  3. Hold this position for ___________ seconds.  4. Slowly push with your legs to return to standing. Do not use your hands to pull yourself to standing.  Repeat __________ times. Complete this exercise __________ times a day.  This information is not intended to replace advice given to you by your health care provider. Make sure you discuss any questions you have with your health care provider.  Document Released: 01/05/2007 Document Revised: 09/11/2017 Document Reviewed: 12/12/2016  [employer] Interactive Patient Education © 2017 [employer]

## 2019-06-25 NOTE — PROGRESS NOTES
Kayla Jensen is a 68 y.o. female who presents for Work-Related Injury (New W/C DOI 6/23/2019, fell backwards on tile and landed on her buttocks, slipped on liquid, upper back pain, able to be perform ROM but feels like she is slating when walking. )    DOI 6/23/19: The pt slipped on liquid that was on the tile and fell on her bottom. She was able to finish the rest of her shift approximately 45 minutes but noticed worsening pain in the R hip and mid back. She is also experiencing intermittent in R leg. She is unable to walking normal and feels as if she is leaning to the left. The pain is described as a constant 7/10. Worse with palpation. The pt takes tramadol daily, this did not improve pain. She also tried stretching this morning some improvement. Previous R meniscus repair a few years ago. Denies having a second job.   HPI  Review of Systems   Constitutional: Negative for chills, fever and malaise/fatigue.   Respiratory: Negative for cough and shortness of breath.    Gastrointestinal: Negative for abdominal pain, constipation, diarrhea, nausea and vomiting.   Musculoskeletal: Positive for back pain, falls and joint pain.   Neurological: Negative for tingling and weakness.   All other systems reviewed and are negative.        PMH: No pertinent past medical history to this problem  MEDS: Medications were reviewed in Epic  ALLERGIES: Allergies were reviewed in Epic  SOCHX: Works as   FH: No pertinent family history to this problem     Objective:   /60   Pulse 73   Temp 36.8 °C (98.3 °F) (Temporal)   Resp 16   Ht 1.524 m (5')   Wt 76.2 kg (168 lb)   LMP 09/25/2007   SpO2 95%   BMI 32.81 kg/m²     Physical Exam   Constitutional: She is oriented to person, place, and time. She appears well-developed and well-nourished. No distress.   HENT:   Head: Normocephalic and atraumatic.   Nose: Nose normal.   Eyes: Pupils are equal, round, and reactive to light. Conjunctivae are normal.      Neck: Normal range of motion. Neck supple. No tracheal deviation present.   Cardiovascular: Normal rate and regular rhythm.    Pulmonary/Chest: Effort normal and breath sounds normal.   Musculoskeletal:        Right hip: She exhibits decreased range of motion, tenderness and bony tenderness. She exhibits no swelling and no crepitus.        Thoracic back: She exhibits decreased range of motion, tenderness and bony tenderness. She exhibits no swelling, no edema and no deformity.        Back:    Neurological: She is alert and oriented to person, place, and time.   Skin: Skin is warm and dry. Capillary refill takes less than 2 seconds.   Psychiatric: She has a normal mood and affect. Her behavior is normal.   Vitals reviewed.    XR hip:  FINDINGS:  There is no evidence of hip fracture or dislocation. The hip joints are symmetric. The joint spaces are preserved.   Impression       No radiographic evidence of acute traumatic injury.     XR thoracic spine:    FINDINGS:  There is no evidence of hip fracture or dislocation. The hip joints are symmetric. The joint spaces are preserved.   Impression       No radiographic evidence of acute traumatic injury.            Assessment/Plan:     1. Fall, initial encounter  DX-THORACIC SPINE-2 VIEWS   2. Strain of right hip, initial encounter  DX-HIP-UNILATERAL-WITH PELVIS-1 VIEW RIGHT    DX-THORACIC SPINE-2 VIEWS   3. Acute midline thoracic back pain  DX-THORACIC SPINE-2 VIEWS   4. Work related injury       Per my read, no fracture seen on x-ray.  Agree with radiology report.     Supportive care reviewed.  Continue tramadol.  Start back and hip exercises.  Educational handout provided.  Work restrictions reviewed, D 39 and C4 provided.    Follow-up in urgent care in 5 days.  If symptoms worsen or persist patient can return to clinic for reevaluation.  Red flags and emergency room precautions discussed. All side effects of medication discussed including allergic response, GI upset,  tendon injury, etc. Patient verbalized understanding of information.     Please note that this dictation was created using voice recognition software. I have made every reasonable attempt to correct obvious errors, but I expect that there are errors of grammar and possibly content that I did not discover before finalizing the note.

## 2019-06-25 NOTE — LETTER
[facility]  [address] [place] [address]  Phone:  [phone] - Fax:  [phone]   [facility] Progress Report and Disability Certification  Date of Service: 6/25/2019   No Show:  No  Date / Time of Next Visit: 6/29/2019@10:00 AM   Claim Information   Patient Name: Kayla Jensen  Claim Number:     Employer: [employer]  Date of Injury: 6/23/2019     Insurer / TPA: Workers Choice  ID / SSN:     Occupation:   Diagnosis: The primary encounter diagnosis was Fall, initial encounter. Diagnoses of Strain of right hip, initial encounter, Acute midline thoracic back pain, and Work related injury were also pertinent to this visit.    Medical Information   Related to Industrial Injury? Yes    Subjective Complaints:  DOI 6/23/19: The pt slipped on liquid that was on the tile and fell on her bottom. She was able to finish the rest of her shift approximately 45 minutes but noticed worsening pain in the R hip and mid back. She is also experiencing intermittent in R leg. She is unable to walking normal and feels as if she is leaning to the left. The pain is described as a constant 7/10. Worse with palpation. The pt takes tramadol daily, this did not improve pain. She also tried stretching this morning some improvement. Previous R meniscus repair a few years ago. Denies having a second job.   Objective Findings: Physical Exam   Constitutional: She is oriented to person, place, and time. She appears well-developed and well-nourished. No distress.   HENT:   Head: Normocephalic and atraumatic.   Nose: Nose normal.   Eyes: Pupils are equal, round, and reactive to light. Conjunctivae are normal.   Neck: Normal range of motion. Neck supple. No tracheal deviation present.   Cardiovascular: Normal rate and regular rhythm.    Pulmonary/Chest: Effort normal and breath sounds normal.   Musculoskeletal:        Right hip: She exhibits decreased range of motion,  tenderness and bony tenderness. She exhibits no swelling and no crepitus.        Thoracic back: She exhibits decreased range of motion, tenderness and bony tenderness. She exhibits no swelling, no edema and no deformity.        Back:    Neurological: She is alert and oriented to person, place, and time.   Skin: Skin is warm and dry. Capillary refill takes less than 2 seconds.   Psychiatric: She has a normal mood and affect. Her behavior is normal.   Vitals reviewed.     Pre-Existing Condition(s):     Assessment:   Initial Visit    Status: Additional Care Required  Permanent Disability:No    Plan: Medication  Comments:Tramadol     Diagnostics: X-ray  Comments:neg R hip and thoracic spine     Comments:       Disability Information   Status: Released to Restricted Duty    From:  2019  Through: 2019 Restrictions are: Temporary   Physical Restrictions   Sitting:    Standing:    Stooping:    Bendin hrs/day   Squatting:    Walking:  < or = to 1 hr/day Climbing:    Pushin hrs/day   Pullin hrs/day Other:    Reaching Above Shoulder (L):   Reaching Above Shoulder (R):       Reaching Below Shoulder (L):    Reaching Below Shoulder (R):      Not to exceed Weight Limits   Carrying(hrs):   Weight Limit(lb): < or = to 10 pounds Lifting(hrs):   Weight  Limit(lb):     Comments:      Repetitive Actions   Hands: i.e. Fine Manipulations from Grasping:     Feet: i.e. Operating Foot Controls:     Driving / Operate Machinery:     Physician Name: Umm Purdy P.A.-C. Physician Signature: UMM Anand P.A.-C. e-Signature: Dr. Ernesto Garcia, Medical Director   Clinic Name / Location: [address] [address] Clinic Phone Number: Dept: [phone]   Appointment Time: 10:15 Am Visit Start Time: 12:13 PM   Check-In Time:  10:23 Am Visit Discharge Time:  1:46 PM   Original-Treating Physician or Chiropractor    Page 2-Insurer/TPA    Page 3-Employer    Page  4-Employee

## 2019-06-25 NOTE — LETTER
EMPLOYEE’S CLAIM FOR COMPENSATION/ REPORT OF INITIAL TREATMENT  FORM C-4    EMPLOYEE’S CLAIM - PROVIDE ALL INFORMATION REQUESTED   First Name  Kayla Last Name  Mikey Birthdate                    1950                Sex  female Claim Number   Home Address  [address] Age  68 y.o. Height  1.524 m (5') Weight  76.2 kg (168 lb) [facility]     [facility] Zip  [address] Telephone  [phone] (home)    Mailing Address  [address] [facility] Zip  [address] Primary Language Spoken  English    Insurer  Unknown Third Party   Workers Choice   Employee's Occupation (Job Title) When Injury or Occupational Disease Occurred      Employer's Name  [employer]  Telephone  [phone]    Employer Address  [address]  formerly [facility]  [address]    Date of Injury  6/23/2019               Hour of Injury  4:30 PM Date Employer Notified  6/24/2019 Last Day of Work after Injury or Occupational Disease  6/25/2019 Supervisor to Whom Injury Reported  SILVIA Cardenas   Address or Location of Accident (if applicable)  [[address]]   What were you doing at the time of accident? (if applicable)  Walking    How did this injury or occupational disease occur? (Be specific an answer in detail. Use additional sheet if necessary)  I was walking near [facility] in [place] when I slipped on liquid on tile and fell.   If you believe that you have an occupational disease, when did you first have knowledge of the disability and it relationship to your employment?  n/a Witnesses to the Accident  n/a      Nature of Injury or Occupational Disease  Contusion  Part(s) of Body Injured or Affected  Lower Arm (R), Upper Leg (R), Lower Back Area (Lumbar Area & Lumbo-Sacral)    I certify that the above is true and correct to the best of my knowledge and that I have provided this information in order to obtain  the benefits of [place]  I hereby authorize any physician, chiropractor, surgeon, practitioner, or other person, any hospital, including [facility] or [facility] hospital, any medical service organization, any insurance company, or other institution or organization to release to each other, any medical or other information, including benefits paid or payable, pertinent to this injury or disease, except information relative to diagnosis, treatment and/or counseling for AIDS, psychological conditions, alcohol or controlled substances, for which I must give specific authorization.  A Photostat of this authorization shall be as valid as the original.     Date   Place   Employee’s Signature   THIS REPORT MUST BE COMPLETED AND MAILED WITHIN 3 WORKING DAYS OF TREATMENT   Place  [facility]  Name of [facility]   Date  6/25/2019 Diagnosis  (W19.XXXA) Fall, initial encounter  (primary encounter diagnosis)  (S76.011A) Strain of right hip, initial encounter  (M54.6) Acute midline thoracic back pain Is there evidence the injured employee was under the influence of alcohol and/or another controlled substance at the time of accident?   Hour  12:13 PM Description of Injury or Disease  The primary encounter diagnosis was Fall, initial encounter. Diagnoses of Strain of right hip, initial encounter, Acute midline thoracic back pain, and Work related injury were also pertinent to this visit. No   Treatment  Rest, ice/heat, back exercises, hip stretches.  Have you advised the patient to remain off work five days or more? No   X-Ray Findings  Negative  Comments:Right hip, thoracic spine   If Yes   From Date  To Date      From information given by the employee, together with medical evidence, can you directly connect this injury or occupational disease as job incurred?  Yes If No Full Duty  No Modified  "Duty  Yes   Is additional medical care by a physician indicated?  Yes If Modified Duty, Specify any Limitations / Restrictions  Please see D 39   Do you know of any previous injury or disease contributing to this condition or occupational disease?                            No   Date  6/25/2019 Print Doctor’s Name Umm Purdy P.A.-C. I certify the employer’s copy of  this form was mailed on:   Address  [address] [place] [address] Insurer’s Use Only     [facility]  [address]    Provider’s Tax ID Number  [id number] Telephone  Dept: [phone]        e-UMM Solis P.A.-C.   e-Signature: Dr. Ernesto Garcia, Medical Director Degree  ALESSANDRA        ORIGINAL-TREATING PHYSICIAN OR CHIROPRACTOR    PAGE 2-INSURER/TPA    PAGE 3-EMPLOYER    PAGE 4-EMPLOYEE             Form C-4 (rev10/07)              BRIEF DESCRIPTION OF RIGHTS AND BENEFITS  (Pursuant to [place] 616C.050)    Notice of Injury or Occupational Disease (Incident Report Form C-1): If an injury or occupational disease (OD) arises out of and in the  course of employment, you must provide written notice to your employer as soon as practicable, but no later than 7 days after the accident or  OD. Your employer shall maintain a sufficient supply of the required forms.    Claim for Compensation (Form C-4): If medical treatment is sought, the form C-4 is available at the place of initial treatment. A completed  \"Claim for Compensation\" (Form C-4) must be filed within 90 days after an accident or OD. The treating physician or chiropractor must,  within 3 working days after treatment, complete and mail to the employer, the employer's insurer and third-party , the Claim for  Compensation.    Medical Treatment: If you require medical treatment for your on-the-job injury or OD, you may be required to select a physician or  chiropractor from a list provided by your workers’ compensation insurer, if it has contracted with an Organization for Managed " Care (MCO) or  Preferred Provider Organization (PPO) or providers of health care. If your employer has not entered into a contract with an MCO or PPO, you  may select a physician or chiropractor from the Panel of Physicians and Chiropractors. Any medical costs related to your industrial injury or  OD will be paid by your insurer.    Temporary Total Disability (TTD): If your doctor has certified that you are unable to work for a period of at least 5 consecutive days, or 5  cumulative days in a 20-day period, or places restrictions on you that your employer does not accommodate, you may be entitled to TTD  compensation.    Temporary Partial Disability (TPD): If the wage you receive upon reemployment is less than the compensation for TTD to which you are  entitled, the insurer may be required to pay you TPD compensation to make up the difference. TPD can only be paid for a maximum of 24  months.    Permanent Partial Disability (PPD): When your medical condition is stable and there is an indication of a PPD as a result of your injury or  OD, within 30 days, your insurer must arrange for an evaluation by a rating physician or chiropractor to determine the degree of your PPD. The  amount of your PPD award depends on the date of injury, the results of the PPD evaluation and your age and wage.    Permanent Total Disability (PTD): If you are medically certified by a treating physician or chiropractor as permanently and totally disabled  and have been granted a PTD status by your insurer, you are entitled to receive monthly benefits not to exceed 66 2/3% of your average  monthly wage. The amount of your PTD payments is subject to reduction if you previously received a PPD award.    Vocational Rehabilitation Services: You may be eligible for vocational rehabilitation services if you are unable to return to the job due to a  permanent physical impairment or permanent restrictions as a result of your injury or occupational  disease.    Transportation and Per Hilary Reimbursement: You may be eligible for travel expenses and per hilary associated with medical treatment.    Reopening: You may be able to reopen your claim if your condition worsens after claim closure.    Appeal Process: If you disagree with a written determination issued by the insurer or the insurer does not respond to your request, you may  appeal to the Department of Administration, , by following the instructions contained in your determination letter. You must  appeal the determination within 70 days from the date of the determination letter at [address], [address], [place], [place]  [address], or [address], [address], [place], [place] [address]. If you disagree with the  decision, you may appeal to the  Department of Administration, . You must file your appeal within 30 days from the date of the  decision  letter at [address], [address], [place], [place] [address], or [address], [place], [place] [address]. If you  disagree with a decision of an , you may file a petition for judicial review with the District Court. You must do so within 30  days of the Appeal Officer’s decision. You may be represented by an  at your own expense or you may contact the [facility] for possible  representation.    [place]  for Injured Workers (NAIW): If you disagree with a  decision, you may request that NAIW represent you  without charge at an  Hearing. For information regarding denial of benefits, you may contact the [facility] at: [address], [address], [place] [address], [phone], or [address] [facility] [address], [place] [address], [phone]    To File a Complaint with the Division: If you wish to file a complaint with the  of the Division of Industrial Relations (DIR),  please contact  the Workers’ Compensation Section, [address], [address], [place], [place] [address], telephone [phone], or  [address], [address], [place], [place] [address], telephone [phone].    For assistance with Workers’ Compensation Issues: you may contact the Office of the [facility] Consumer Health Assistance, [address] [place] [address], [address], [place], [place] [address], Toll Free 1-212.336.7125, Web site: http://govcha.[facility].[place]., E-mail  [email or web address].[place].                                                                                                                                                                                                                                   __________________________________________________________________                                                                   _________________                Employee Name / Signature                                                                                                                                                       Date                                                                                                                                                                                                     D-2 (rev. 10/07)

## 2019-06-25 NOTE — PROGRESS NOTES
Subjective:   Kayla Jesnen is a 68 y.o. female who presents for Work-Related Injury (New W/C DOI 6/23/2019, fell backwards on tile and landed on her buttocks, slipped on liquid, upper back pain, able to be perform ROM but feels like she is slating when walking. )        HPI  ROS    PMH:  has a past medical history of Actinic keratoses (11/6/2018); Allergic rhinitis; Allergy; Anesthesia (07/2018); Arthritis (07/2018); Back pain; Breath shortness (07/2018); Bronchitis; Cataract; Chickenpox; Dental disorder; Emphysema of lung (HCC) (07/2018); GERD (gastroesophageal reflux disease); Macedonian measles; Heart burn; Influenza; Lentigo (11/6/2018); Nasal drainage; Neoplasm of uncertain behavior of skin of cheek (9/25/2018); Neoplasm of uncertain behavior of skin of chest (11/6/2018); Personal history of venous thrombosis and embolism (1982); Pneumonia; Renal disorder; Skin lesion of right lower extremity (9/25/2018); Sleep apnea (07/2018); and Unspecified hemorrhagic conditions.  MEDS:   Current Outpatient Prescriptions:   •  [START ON 8/22/2019] tramadol (ULTRAM) 50 MG Tab, Take 1-2 Tabs by mouth 1 time daily as needed for Severe Pain (6 hours apart from Tylenol #3) for up to 30 days., Disp: 60 Tab, Rfl: 0  •  tiotropium (SPIRIVA) 18 MCG Cap, Inhale 1 Cap by mouth every day., Disp: 30 Cap, Rfl: 5  •  gabapentin (NEURONTIN) 300 MG Cap, Take 3 Caps by mouth 2 Times a Day., Disp: 540 Cap, Rfl: 3  •  fexofenadine-pseudoephedrine (ALLEGRA-D)  MG per tablet, TAKE 1 TABLET BY MOUTH 2 TIMES A DAY., Disp: 60 Tab, Rfl: 3  •  [START ON 8/12/2019] Acetaminophen-Codeine (TYLENOL/CODEINE #3) 300-30 MG Tab, Take 1-2 Tabs by mouth 1 time daily as needed (severe pain at bedtime only) for up to 30 days. (Patient not taking: Reported on 6/25/2019), Disp: 60 Tab, Rfl: 0  •  budesonide-formoterol (SYMBICORT) 160-4.5 MCG/ACT Aerosol, Inhale 2 Puffs by mouth 2 Times a Day., Disp: 2 Inhaler, Rfl: 11  •  albuterol (PROAIR HFA) 108 (90  Base) MCG/ACT Aero Soln inhalation aerosol, Inhale 2 Puffs by mouth every 6 hours as needed for Shortness of Breath., Disp: 8.5 g, Rfl: 3  •  Cholecalciferol (VITAMIN D3) 5000 units Cap, Take 1 Cap by mouth every day., Disp: , Rfl:   •  eucalyptus/peppermint oil (PONARIS NASAL) Solution, Spray 10 Drops in nose every 6 hours as needed., Disp: 1 Bottle, Rfl: 2  ALLERGIES:   Allergies   Allergen Reactions   • Aspirin      Stomach pain   • Ibuprofen      Stomach pain   • Tape Rash     SURGHX:   Past Surgical History:   Procedure Laterality Date   • KNEE ARTHROSCOPY Right 7/25/2018    Procedure: KNEE ARTHROSCOPY;  Surgeon: Goldy Tran M.D.;  Location: [facility];  Service: Orthopedics   • MEDIAL MENISCECTOMY  7/25/2018    Procedure: MEDIAL MENISCECTOMY- PARTIAL;  Surgeon: Goldy Tran M.D.;  Location: [facility];  Service: Orthopedics   • CHONDROPLASTY  7/25/2018    Procedure: CHONDROPLASTY ;  Surgeon: Goldy Tran M.D.;  Location: [facility];  Service: Orthopedics   • CATARACT EXTRACTION WITH IOL Bilateral 2013   • NASAL SEPTAL RECONSTRUCTION  2013    sinus surgery   • KNEE ARTHROPLASTY TOTAL  3/18/2009    Performed by SURI PARK at [facility]   • TUBE & ECTOPIC PREG., REMOVAL  1982   • CYST EXCISION Right 1970    right breast   • ARTHROSCOPY, KNEE     • HYSTERECTOMY, VAGINAL     • KY THROAT SURGERY PROCEDURE UNLISTED     • SINUSCOPE     • TONSILLECTOMY     • TONSILLECTOMY       SOCHX:  reports that she quit smoking about 17 years ago. Her smoking use included Cigarettes. She has a 60.00 pack-year smoking history. She has never used smokeless tobacco. She reports that she drinks alcohol. She reports that she does not use drugs.  Family History   Problem Relation Age of Onset   • Heart Disease Mother    • Dementia Mother    • Cancer Brother         lead        Objective:   LMP 09/25/2007     Physical Exam      Assessment/Plan:   No diagnosis  found.    Follow-up with primary care provider within 7-10 days.  If symptoms worsen or persist patient can return to clinic for reevaluation.  Red flags and emergency room precautions discussed. All side effects of medication discussed including allergic response, GI upset, tendon injury, etc. Patient verbalized understanding of information.    Please note that this dictation was created using voice recognition software. I have made every reasonable attempt to correct obvious errors, but I expect that there are errors of grammar and possibly content that I did not discover before finalizing the note.

## 2019-06-29 ENCOUNTER — OCCUPATIONAL MEDICINE (OUTPATIENT)
Dept: URGENT CARE | Facility: CLINIC | Age: 69
End: 2019-06-29
Payer: COMMERCIAL

## 2019-06-29 VITALS
BODY MASS INDEX: 33.38 KG/M2 | OXYGEN SATURATION: 96 % | HEART RATE: 77 BPM | HEIGHT: 60 IN | TEMPERATURE: 97.4 F | DIASTOLIC BLOOD PRESSURE: 80 MMHG | RESPIRATION RATE: 18 BRPM | WEIGHT: 170 LBS | SYSTOLIC BLOOD PRESSURE: 130 MMHG

## 2019-06-29 DIAGNOSIS — M54.42 ACUTE BILATERAL LOW BACK PAIN WITH BILATERAL SCIATICA: ICD-10-CM

## 2019-06-29 DIAGNOSIS — S76.011D STRAIN OF RIGHT HIP, SUBSEQUENT ENCOUNTER: ICD-10-CM

## 2019-06-29 DIAGNOSIS — M54.41 ACUTE BILATERAL LOW BACK PAIN WITH BILATERAL SCIATICA: ICD-10-CM

## 2019-06-29 DIAGNOSIS — M54.6 ACUTE MIDLINE THORACIC BACK PAIN: ICD-10-CM

## 2019-06-29 PROCEDURE — 99214 OFFICE O/P EST MOD 30 MIN: CPT | Mod: 29 | Performed by: NURSE PRACTITIONER

## 2019-06-29 ASSESSMENT — ENCOUNTER SYMPTOMS
DIZZINESS: 0
VOMITING: 0
BACK PAIN: 1
SHORTNESS OF BREATH: 0
CHILLS: 0
FEVER: 0
MYALGIAS: 0
EYE PAIN: 0
NAUSEA: 0
SORE THROAT: 0
FALLS: 1

## 2019-06-29 NOTE — PROGRESS NOTES
"Subjective:   Kayla Jensen is a 68 y.o. female who presents for Follow-Up (leg pain still )    DOI 6/23/19: \"The pt slipped on liquid that was on the tile and fell on her bottom. She was able to finish the rest of her shift approximately 45 minutes but noticed worsening pain in the R hip and mid back. She is also experiencing intermittent in R leg pain.\"  At initial visit x-rays were obtained and negative for acute fracture dislocation.  Patient returns today for first urgent care follow-up With worsening symptoms.  Patient states that now she is experiencing a heaviness in bilateral lower extremities.  She denies any loss of bowel or bladder however does feel like it is to walk due to the heaviness.  She is currently taking tramadol and codeine as needed for the pain.  Patient has been doing home exercises which have provided minimal relief.   HPI  Review of Systems   Constitutional: Negative for chills and fever.   HENT: Negative for sore throat.    Eyes: Negative for pain.   Respiratory: Negative for shortness of breath.    Cardiovascular: Negative for chest pain.   Gastrointestinal: Negative for nausea and vomiting.   Genitourinary: Negative for hematuria.   Musculoskeletal: Positive for back pain and falls. Negative for myalgias.   Skin: Negative for rash.   Neurological: Negative for dizziness.     Allergies   Allergen Reactions   • Aspirin      Stomach pain   • Ibuprofen      Stomach pain   • Tape Rash      Objective:   /80 (BP Location: Right arm, Patient Position: Sitting, BP Cuff Size: Adult)   Pulse 77   Temp 36.3 °C (97.4 °F) (Temporal)   Resp 18   Ht 1.524 m (5')   Wt 77.1 kg (170 lb)   LMP 09/25/2007   SpO2 96%   BMI 33.20 kg/m²   Physical Exam   Constitutional: She is oriented to person, place, and time. She appears well-developed and well-nourished. No distress.   HENT:   Head: Normocephalic and atraumatic.   Eyes: Pupils are equal, round, and reactive to light. Conjunctivae " and EOM are normal.   Cardiovascular: Normal rate and regular rhythm.    No murmur heard.  Pulmonary/Chest: Effort normal and breath sounds normal. No respiratory distress.   Abdominal: Soft. She exhibits no distension. There is no tenderness.   Musculoskeletal:        Right hip: She exhibits tenderness. She exhibits normal range of motion and normal strength.        Thoracic back: She exhibits decreased range of motion, tenderness, pain and spasm.        Lumbar back: She exhibits decreased range of motion and pain. She exhibits no tenderness and no spasm.        Back:    Neurological: She is alert and oriented to person, place, and time. She has normal reflexes. No sensory deficit.   Skin: Skin is warm and dry.   Psychiatric: She has a normal mood and affect.     Spine: No midline tenderness, no step-off, deformity.  Thoracic bilateral muscle spasms noted on palpation.  Decreased range of motion in all planes due to pain. Without noted tenderness over the sacroiliac notches. Sensation intact bilaterally, BLE motor 5/5 and symmetrical  strength. Negative Straight leg raise.  Gait- WNL without foot drop. Right hip: TTP, pain elicited with flexion, extension, internal/external rotation.     Assessment/Plan:     1. Strain of right hip, subsequent encounter  REFERRAL TO OCCUPATIONAL MEDICINE   2. Acute midline thoracic back pain  REFERRAL TO OCCUPATIONAL MEDICINE   3. Acute bilateral low back pain with bilateral sciatica       Patient continues to have low back pain radiating to lower extremities.  Discuss referral for physical therapy however patient declining at this time as she wishes to continue home exercises.  Encourage patient to continue gentle range of motion, stretching, heat, and continue taking pain medication as directed.  Advised not to take medication while at work.  We will keep patient on temporary work restrictions.  We will transfer care to occupational health for further evaluation  management.  Differential diagnosis, natural history, supportive care, and indications for immediate follow-up discussed.

## 2019-06-29 NOTE — LETTER
"   [facility]  [address] [place] [address]  Phone:  [phone] - Fax:  [phone]   [facility] Progress Report and Disability Certification  Date of Service: 6/29/2019   No Show:  No  Date / Time of Next Visit: 7/3/2019   Claim Information   Patient Name: Kayla Jensen  Claim Number:     Employer: [employer] *** Date of Injury: 6/23/2019     Insurer / TPA: Workers [employer] *** ID / SSN:     Occupation:  *** Diagnosis: Diagnoses of Strain of right hip, subsequent encounter, Acute midline thoracic back pain, and Acute bilateral low back pain with bilateral sciatica were pertinent to this visit.    Medical Information   Related to Industrial Injury? Yes ***   Subjective Complaints:  DOI 6/23/19: \"The pt slipped on liquid that was on the tile and fell on her bottom. She was able to finish the rest of her shift approximately 45 minutes but noticed worsening pain in the R hip and mid back. She is also experiencing intermittent in R leg pain.\"  At initial visit x-rays were obtained and negative for acute fracture dislocation.  Patient returns today for first urgent care follow-up With worsening symptoms.  Patient states that now she is experiencing a heaviness in bilateral lower extremities.  She denies any loss of bowel or bladder however does feel like it is to walk due to the heaviness.  She is currently taking tramadol and codeine as needed for the pain.  Patient has been doing home exercises which have provided minimal relief.   Objective Findings: Spine: No midline tenderness, no step-off, deformity.  Thoracic bilateral muscle spasms noted on palpation.  Decreased range of motion in all planes due to pain. Without noted tenderness over the sacroiliac notches. Sensation intact bilaterally, BLE motor 5/5 and symmetrical  strength. Negative Straight leg raise.  Gait- WNL without foot drop. Right hip: TTP, pain elicited with flexion, " extension, internal/external rotation.     Pre-Existing Condition(s):     Assessment:   Condition Worsened    Status: Additional Care Required  Permanent Disability:No    Plan: Transfer Care    Diagnostics:      Comments:       Disability Information   Status: Released to Restricted Duty    From:  2019  Through: 7/3/2019 Restrictions are: Temporary   Physical Restrictions   Sitting:    Standing:    Stoopin hrs/day Bendin hrs/day   Squatting:    Walking:  < or = to 1 hr/day Climbin hrs/day Pushing:  < or = to 1 hr/day   Pulling:  < or = to 1 hr/day Other:    Reaching Above Shoulder (L):   Reaching Above Shoulder (R):       Reaching Below Shoulder (L):    Reaching Below Shoulder (R):      Not to exceed Weight Limits   Carrying(hrs):   Weight Limit(lb): < or = to 10 pounds Lifting(hrs):   Weight  Limit(lb): < or = to 10 pounds   Comments: Patient continues to have low back pain radiating to lower extremities.  Discuss referral for physical therapy however patient declining at this time as she wishes to continue home exercises.  Encourage patient to continue gentle range of motion, stretching, heat, and continue taking pain medication as directed.  Advised not to take medication while at work.  We will keep patient on temporary work restrictions.  We will transfer care to occupational health for further evaluation management.    Repetitive Actions   Hands: i.e. Fine Manipulations from Grasping:     Feet: i.e. Operating Foot Controls:     Driving / Operate Machinery:     Physician Name: KATY Cooper Physician Signature: CHANCE Rivera e-Signature: Dr. Ernesto Garcia, Medical Director   Clinic Name / Location: [address] [place] [address] Clinic Phone Number: Dept: [phone]   Appointment Time: 10:00 Am Visit Start Time: 9:55 AM   Check-In Time:  9:48 Am Visit Discharge Time:  ***   Original-Treating Physician or Chiropractor     Page 2-Insurer/TPA    Page 3-Employer    Page 4-Employee

## 2019-06-29 NOTE — LETTER
"   [facility]  [address] [facility] [address] [place] [address]  Phone:  [phone] - Fax:  [phone]   [facility] Progress Report and Disability Certification  Date of Service: 6/29/2019   No Show:  No  Date / Time of Next Visit: 7/8/2019@9:30am   Claim Information   Patient Name: Kayla Jensen  Claim Number:     Employer: [employer]  Date of Injury: 6/23/2019     Insurer / TPA: Workers [employer]  ID / SSN:     Occupation:   Diagnosis: Diagnoses of Strain of right hip, subsequent encounter, Acute midline thoracic back pain, and Acute bilateral low back pain with bilateral sciatica were pertinent to this visit.    Medical Information   Related to Industrial Injury? Yes    Subjective Complaints:  DOI 6/23/19: \"The pt slipped on liquid that was on the tile and fell on her bottom. She was able to finish the rest of her shift approximately 45 minutes but noticed worsening pain in the R hip and mid back. She is also experiencing intermittent in R leg pain.\"  At initial visit x-rays were obtained and negative for acute fracture dislocation.  Patient returns today for first urgent care follow-up With worsening symptoms.  Patient states that now she is experiencing a heaviness in bilateral lower extremities.  She denies any loss of bowel or bladder however does feel like it is to walk due to the heaviness.  She is currently taking tramadol and codeine as needed for the pain.  Patient has been doing home exercises which have provided minimal relief.   Objective Findings: Spine: No midline tenderness, no step-off, deformity.  Thoracic bilateral muscle spasms noted on palpation.  Decreased range of motion in all planes due to pain. Without noted tenderness over the sacroiliac notches. Sensation intact bilaterally, BLE motor 5/5 and symmetrical  strength. Negative Straight leg raise.  Gait- WNL without foot drop. Right hip: TTP, pain elicited with flexion, extension, " internal/external rotation.     Pre-Existing Condition(s):     Assessment:   Condition Worsened    Status: Additional Care Required  Permanent Disability:No    Plan: Transfer Care    Diagnostics:      Comments:       Disability Information   Status: Released to Restricted Duty    From:  2019  Through: 2019 Restrictions are: Temporary   Physical Restrictions   Sitting:    Standing:    Stoopin hrs/day Bendin hrs/day   Squatting:    Walking:  < or = to 1 hr/day Climbin hrs/day Pushing:  < or = to 1 hr/day   Pulling:  < or = to 1 hr/day Other:    Reaching Above Shoulder (L):   Reaching Above Shoulder (R):       Reaching Below Shoulder (L):    Reaching Below Shoulder (R):      Not to exceed Weight Limits   Carrying(hrs):   Weight Limit(lb): < or = to 10 pounds Lifting(hrs):   Weight  Limit(lb): < or = to 10 pounds   Comments: Patient continues to have low back pain radiating to lower extremities.  Discuss referral for physical therapy however patient declining at this time as she wishes to continue home exercises.  Encourage patient to continue gentle range of motion, stretching, heat, and continue taking pain medication as directed.  Advised not to take medication while at work.  We will keep patient on temporary work restrictions.  We will transfer care to occupational health for further evaluation management.    Repetitive Actions   Hands: i.e. Fine Manipulations from Grasping:     Feet: i.e. Operating Foot Controls:     Driving / Operate Machinery:     Physician Name: KATY Cooper Physician Signature: CHANCE Rivera e-Signature: Dr. Ernesto Garcia, Medical Director   Clinic Name / Location: [address] [address] Clinic Phone Number: Dept: [phone]   Appointment Time: 10:00 Am Visit Start Time: 9:55 AM   Check-In Time:  9:48 Am Visit Discharge Time:  10:20am   Original-Treating Physician or Chiropractor    Page  2-Insurer/TPA    Page 3-Employer    Page 4-Employee

## 2019-07-01 NOTE — ASSESSMENT & PLAN NOTE
Ongoing chronic problem with pain in lower back radiating in left leg and foot. The pain is worsening over time this past year. Treated with gabapentin, Tramadol in am, Tylenol #3 in pm only (because it causes drowsiness), lidocaine patches were not covered by insurance, and also procedural management with Dr. Araiza. She just had epidural in May 2018   No complaints

## 2019-07-03 ENCOUNTER — TELEPHONE (OUTPATIENT)
Dept: OCCUPATIONAL MEDICINE | Facility: CLINIC | Age: 69
End: 2019-07-03

## 2019-07-05 ENCOUNTER — NON-PROVIDER VISIT (OUTPATIENT)
Dept: OCCUPATIONAL MEDICINE | Facility: CLINIC | Age: 69
End: 2019-07-05
Payer: COMMERCIAL

## 2019-07-05 DIAGNOSIS — Z02.83 ENCOUNTER FOR DRUG SCREENING: ICD-10-CM

## 2019-07-05 PROCEDURE — 8911 PR MRO FEE: Performed by: PREVENTIVE MEDICINE

## 2019-07-08 ENCOUNTER — OCCUPATIONAL MEDICINE (OUTPATIENT)
Dept: OCCUPATIONAL MEDICINE | Facility: CLINIC | Age: 69
End: 2019-07-08
Payer: COMMERCIAL

## 2019-07-08 VITALS
WEIGHT: 170 LBS | TEMPERATURE: 98.7 F | OXYGEN SATURATION: 95 % | DIASTOLIC BLOOD PRESSURE: 82 MMHG | HEART RATE: 77 BPM | SYSTOLIC BLOOD PRESSURE: 128 MMHG | RESPIRATION RATE: 14 BRPM | HEIGHT: 60 IN | BODY MASS INDEX: 33.38 KG/M2

## 2019-07-08 DIAGNOSIS — S76.011D HIP STRAIN, RIGHT, SUBSEQUENT ENCOUNTER: ICD-10-CM

## 2019-07-08 DIAGNOSIS — S29.019D THORACIC MYOFASCIAL STRAIN, SUBSEQUENT ENCOUNTER: ICD-10-CM

## 2019-07-08 DIAGNOSIS — S39.012D STRAIN OF LUMBAR REGION, SUBSEQUENT ENCOUNTER: ICD-10-CM

## 2019-07-08 PROCEDURE — 99204 OFFICE O/P NEW MOD 45 MIN: CPT | Performed by: PREVENTIVE MEDICINE

## 2019-07-08 ASSESSMENT — PAIN SCALES - GENERAL: PAINLEVEL: 8=MODERATE-SEVERE PAIN

## 2019-07-08 NOTE — LETTER
"   [address],   [address] [place] [address]  Phone:  [phone] - Fax:  [phone]   [facility] Progress Report and Disability Certification  Date of Service: 7/8/2019   No Show:  No  Date / Time of Next Visit: 7/26/2019 @ 8AM   Claim Information   Patient Name: Kayla Jensen  Claim Number:     Employer: [employer]  Date of Injury: 6/23/2019     Insurer / TPA: Workers [employer]  ID / SSN:     Occupation:   Diagnosis: Diagnoses of Hip strain, right, subsequent encounter, Strain of lumbar region, subsequent encounter, and Thoracic myofascial strain, subsequent encounter were pertinent to this visit.    Medical Information   Related to Industrial Injury? Yes    Subjective Complaints:  DOI 6/23/19: 69 yo female presents with right hip and low back pain. She slipped on liquid that was on the tile and fell on her bottom. She noted pain in the right hip and mid back which worsened throughout the day. She was seen in  and x-rays were obtained and negative for acute fracture dislocation.  Patient states that overall symptoms are about the same.  She continues to have right hip and low to mid back pain.  She states she does not have any radiating pain but she does note that after walking for prolonged period of time her legs feel \"heavy\" and like she is \"walking and cement\".  Pain is midline lower thoracic and lumbar.  She does have a history of a pinched nerve on the left side.  She takes tramadol, gabapentin and codeine at night for her chronic pain conditions.  She states that she would like to do more at work and would like to build to stand for 7 hours.   Objective Findings: Lumbothoracic: No gross deformity.  Diffuse tenderness bilateral paraspinal musculature from mid thoracic to lower lumbar spine.  Essentially full range of motion with mild discomfort in flexion and rotation bilaterally.  Slight weakness right " plantar/dorsiflexion and knee extension.  Slight antalgic gait.  Right hip: No gross deformity. tenderness diffusely over the right hip.  Somewhat decreased hip flexion.   Pre-Existing Condition(s):     Assessment:   Condition Same    Status: Additional Care Required  Permanent Disability:No    Plan:      Diagnostics:      Comments:  Referral to physical therapy  Continue tramadol and gabapentin from home  Continue home stretches and exercises  Continue restricted duty  Follow-up 2 weeks    Disability Information   Status: Released to Restricted Duty    From:  7/8/2019  Through: 7/26/2019 Restrictions are: Temporary   Physical Restrictions   Sitting:    Standing:    Stooping:  < or = to 1 hr/day Bending:  < or = to 1 hr/day   Squatting:    Walking:  < or = to 1 hr/day Climbing:  < or = to 1 hr/day Pushing:  < or = to 1 hr/day   Pulling:  < or = to 1 hr/day Other:    Reaching Above Shoulder (L):   Reaching Above Shoulder (R):       Reaching Below Shoulder (L):    Reaching Below Shoulder (R):      Not to exceed Weight Limits   Carrying(hrs):   Weight Limit(lb): < or = to 10 pounds Lifting(hrs):   Weight  Limit(lb): < or = to 10 pounds   Comments:      Repetitive Actions   Hands: i.e. Fine Manipulations from Grasping:     Feet: i.e. Operating Foot Controls:     Driving / Operate Machinery:     Physician Name: Bill Shah D.O. Physician Signature: BILL Mcleod D.O. e-Signature: Dr. Ernesto Garcia, Medical Director   Clinic Name / Location: [address],   [address] [place] [address] Clinic Phone Number: Dept: [phone]   Appointment Time: 9:30 Am Visit Start Time: 8:56 AM   Check-In Time:  8:54 Am Visit Discharge Time: 9:38 AM   Original-Treating Physician or Chiropractor    Page 2-Insurer/TPA    Page 3-Employer    Page 4-Employee

## 2019-07-08 NOTE — PROGRESS NOTES
"Subjective:      Kayla Jensen is a 68 y.o. female who presents with Follow-Up (WC DOI 6/23/19 Lower Arm (R), Upper Leg (R), Lower Back Area (Lumbar Area & Lumbo-Sacral) - Same - RM 17)      DOI 6/23/19: 69 yo female presents with right hip and low back pain. She slipped on liquid that was on the tile and fell on her bottom. She noted pain in the right hip and mid back which worsened throughout the day. She was seen in UC and x-rays were obtained and negative for acute fracture dislocation.  Patient states that overall symptoms are about the same.  She continues to have right hip and low to mid back pain.  She states she does not have any radiating pain but she does note that after walking for prolonged period of time her legs feel \"heavy\" and like she is \"walking and cement\".  Pain is midline lower thoracic and lumbar.  She does have a history of a pinched nerve on the left side.  She takes tramadol, gabapentin and codeine at night for her chronic pain conditions.  She states that she would like to do more at work and would like to build to stand for 7 hours.     HPI    ROS  ROS: All systems were reviewed on intake form, form was reviewed and signed. See scanned documents in media. Pertinent positives and negatives included in HPI.    PMH: History of low back pain, left knee replacement and sciatica for which she takes gabapentin and tramadol once daily.  MEDS: Medications were reviewed in Epic  ALLERGIES:   Allergies   Allergen Reactions   • Aspirin      Stomach pain   • Ibuprofen      Stomach pain   • Tape Rash     SOCHX: Works as a  at the [employer]   FH: No pertinent family history to this problem     Objective:     /82   Pulse 77   Temp 37.1 °C (98.7 °F) (Temporal)   Resp 14   Ht 1.524 m (5')   Wt 77.1 kg (170 lb)   LMP 09/25/2007   SpO2 95%   BMI 33.20 kg/m²      Physical Exam   Constitutional: She is oriented to person, place, and time. She appears well-developed and " well-nourished.   HENT:   Right Ear: External ear normal.   Left Ear: External ear normal.   Eyes: Conjunctivae and EOM are normal.   Cardiovascular: Normal rate.    Pulmonary/Chest: Effort normal.   Neurological: She is alert and oriented to person, place, and time.   Skin: Skin is warm and dry.   Psychiatric: She has a normal mood and affect. Her behavior is normal. Judgment normal.       Lumbothoracic: No gross deformity.  Diffuse tenderness bilateral paraspinal musculature from mid thoracic to lower lumbar spine.  Essentially full range of motion with mild discomfort in flexion and rotation bilaterally.  Slight weakness right plantar/dorsiflexion and knee extension.  Slight antalgic gait.  Right hip: No gross deformity. tenderness diffusely over the right hip.  Somewhat decreased hip flexion.       Assessment/Plan:     1. Hip strain, right, subsequent encounter  - REFERRAL TO PHYSICAL THERAPY Reason for Therapy: Eval/Treat/Report    2. Strain of lumbar region, subsequent encounter  - REFERRAL TO PHYSICAL THERAPY Reason for Therapy: Eval/Treat/Report    3. Thoracic myofascial strain, subsequent encounter  - REFERRAL TO PHYSICAL THERAPY Reason for Therapy: Eval/Treat/Report    Referral to physical therapy  Continue tramadol and gabapentin from home  Continue home stretches and exercises  Continue restricted duty  Follow-up 2 weeks

## 2019-07-11 ENCOUNTER — TELEPHONE (OUTPATIENT)
Dept: MEDICAL GROUP | Facility: PHYSICIAN GROUP | Age: 69
End: 2019-07-11

## 2019-07-11 NOTE — TELEPHONE ENCOUNTER
Future Appointments       Provider Department Center    7/15/2019 7:20 AM KATY Castano [facility]    7/17/2019 7:40 AM KATY Sahni [facility]     7/26/2019 8:00 AM KATY Daniels [facility]    8/7/2019 7:45 AM Roque Neil M.D. [facility]     9/16/2019 7:20 AM KATY Castano [facility]        ESTABLISHED PATIENT PRE-VISIT PLANNING     Patient was NOT contacted to complete PVP.    1.  Reviewed notes from the last few office visits within the medical group: Yes    2.  If any orders were placed at last visit or intended to be done for this visit (i.e. 6 mos follow-up), do we have Results/Consult Notes?        •  Labs - Labs were not ordered at last office visit.       •  Imaging - Imaging was not ordered at last office visit.       •  Referrals - Referral ordered, patient has NOT been seen.    3. Is this appointment scheduled as a Hospital Follow-Up? No    4.  Immunizations were updated in Epic using WebIZ?: Epic matches WebIZ       •  Web Iz Recommendations: FLU, TD, VARICELLA (Chicken Pox)  and SHINGRIX (Shingles)    5.  Patient is due for the following Health Maintenance Topics:   Health Maintenance Due   Topic Date Due   • HEPATITIS C SCREENING  1950   • IMM ZOSTER VACCINES (2 of 3) 01/24/2017   • URINE DRUG SCREEN  05/09/2019       6. Orders for overdue Health Maintenance topics pended in Pre-Charting? N\A    7.  AHA (MDX) form printed for Provider? No, already completed    8.  Patient was NOT informed to arrive 15 min prior to their scheduled appointment and bring in their medication bottles.

## 2019-07-15 ENCOUNTER — OFFICE VISIT (OUTPATIENT)
Dept: MEDICAL GROUP | Facility: PHYSICIAN GROUP | Age: 69
End: 2019-07-15
Payer: MEDICARE

## 2019-07-15 VITALS
RESPIRATION RATE: 12 BRPM | BODY MASS INDEX: 33.77 KG/M2 | HEART RATE: 73 BPM | DIASTOLIC BLOOD PRESSURE: 80 MMHG | OXYGEN SATURATION: 93 % | WEIGHT: 172 LBS | HEIGHT: 60 IN | SYSTOLIC BLOOD PRESSURE: 146 MMHG | TEMPERATURE: 98.1 F
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
## 2019-07-15 DIAGNOSIS — J44.9 MILD CHRONIC OBSTRUCTIVE PULMONARY DISEASE (HCC): ICD-10-CM

## 2019-07-15 DIAGNOSIS — H61.21 RIGHT EAR IMPACTED CERUMEN: ICD-10-CM

## 2019-07-15 DIAGNOSIS — R04.0 EPISTAXIS: ICD-10-CM

## 2019-07-15 DIAGNOSIS — Z11.59 NEED FOR HEPATITIS C SCREENING TEST: ICD-10-CM

## 2019-07-15 PROCEDURE — 99214 OFFICE O/P EST MOD 30 MIN: CPT | Performed by: NURSE PRACTITIONER

## 2019-07-15 RX ORDER — HYDROXYZINE HYDROCHLORIDE 25 MG/1
25 TABLET, FILM COATED ORAL 3 TIMES DAILY PRN
Qty: 60 TAB | Refills: 0 | Status: SHIPPED | OUTPATIENT
Start: 2019-07-15

## 2019-07-15 RX ORDER — TIOTROPIUM BROMIDE 18 UG/1
18 CAPSULE ORAL; RESPIRATORY (INHALATION) DAILY
Qty: 90 CAP | Refills: 3 | Status: SHIPPED | OUTPATIENT
Start: 2019-07-15 | End: 2019-10-07 | Stop reason: SDUPTHER

## 2019-07-15 NOTE — ASSESSMENT & PLAN NOTE
Patient reports that she has a bug bite to her left posterior thigh that has been present for approximately 2 months.  The patient has been applying peroxide and hydrocortisone to the bite daily as well as taking over-the-counter Mucinex or Allegra daily.  The patient does report that she has a lot of spiders at her house and had her house sprayed for bugs about a week ago.  Reports that the bug bite is red, hurts with pressure, and itches (she finds herself scratching it while she sleeping).

## 2019-07-15 NOTE — ASSESSMENT & PLAN NOTE
This is a new problem for the patient that has been going on for the last month.  Patient states that she wakes up every morning with a bloody nose.  She uses over-the-counter Mucinex or Allegra every day for allergies and congestion as well as an over-the-counter saline solution multiple times per day.  The patient is using a nocturnal CPAP machine with humidification.  She has an appointment with Dr. Melendez on 7/29/2019.
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
## 2019-07-15 NOTE — ASSESSMENT & PLAN NOTE
This is a chronic problem for the patient that is ongoing.  She was recently started on Spiriva which she reports has been very effective in helping her breathing.  She does continue to have an ongoing cough with nonpurulent sputum.  Denies any wheezing or hemoptysis.  She needs a refill today of Spiriva.

## 2019-07-17 ENCOUNTER — SLEEP CENTER VISIT (OUTPATIENT)
Dept: SLEEP MEDICINE | Facility: MEDICAL CENTER | Age: 69
End: 2019-07-17
Payer: MEDICARE

## 2019-07-17 VITALS
WEIGHT: 170 LBS | BODY MASS INDEX: 33.38 KG/M2 | HEART RATE: 70 BPM | OXYGEN SATURATION: 94 % | DIASTOLIC BLOOD PRESSURE: 80 MMHG | SYSTOLIC BLOOD PRESSURE: 130 MMHG | HEIGHT: 60 IN | RESPIRATION RATE: 16 BRPM

## 2019-07-17 DIAGNOSIS — Z87.891 FORMER SMOKER: ICD-10-CM

## 2019-07-17 DIAGNOSIS — G47.33 OSA (OBSTRUCTIVE SLEEP APNEA): ICD-10-CM

## 2019-07-17 PROCEDURE — 99214 OFFICE O/P EST MOD 30 MIN: CPT | Performed by: NURSE PRACTITIONER

## 2019-07-17 NOTE — PATIENT INSTRUCTIONS
1) Continue CPAP at 8cmH20  2) Clean mask and supplies weekly and change them as insurance allows  3) Light conditioning encouraged  4) Continue smoking cessation   4) Vaccines: Up to date with Prevnar 13 and Pneumovax 23  5) Return in about 3 months (around 10/17/2019) for follow up with EMIR Velazquez, Compliance, review of symptoms, if not sooner.
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
## 2019-07-25 ENCOUNTER — TELEPHONE (OUTPATIENT)
Dept: OCCUPATIONAL MEDICINE | Facility: CLINIC | Age: 69
End: 2019-07-25

## 2019-07-26 ENCOUNTER — OCCUPATIONAL MEDICINE (OUTPATIENT)
Dept: URGENT CARE | Facility: CLINIC | Age: 69
End: 2019-07-26
Payer: COMMERCIAL

## 2019-07-26 VITALS
HEIGHT: 60 IN | SYSTOLIC BLOOD PRESSURE: 140 MMHG | HEART RATE: 89 BPM | DIASTOLIC BLOOD PRESSURE: 86 MMHG | RESPIRATION RATE: 18 BRPM | TEMPERATURE: 97.7 F | BODY MASS INDEX: 33.38 KG/M2 | WEIGHT: 170 LBS | OXYGEN SATURATION: 94 %

## 2019-07-26 DIAGNOSIS — S39.012D STRAIN OF LUMBAR REGION, SUBSEQUENT ENCOUNTER: ICD-10-CM

## 2019-07-26 DIAGNOSIS — S76.011D HIP STRAIN, RIGHT, SUBSEQUENT ENCOUNTER: ICD-10-CM

## 2019-07-26 DIAGNOSIS — S29.019D THORACIC MYOFASCIAL STRAIN, SUBSEQUENT ENCOUNTER: ICD-10-CM

## 2019-07-26 PROCEDURE — 99214 OFFICE O/P EST MOD 30 MIN: CPT | Mod: 29 | Performed by: NURSE PRACTITIONER

## 2019-07-26 ASSESSMENT — ENCOUNTER SYMPTOMS
VOMITING: 0
SHORTNESS OF BREATH: 0
MYALGIAS: 0
FEVER: 0
FALLS: 1
BACK PAIN: 1
SORE THROAT: 0
EYE PAIN: 0
CHILLS: 0
DIZZINESS: 0
NAUSEA: 0

## 2019-07-26 NOTE — PROGRESS NOTES
Subjective:   Kayla Jensen is a 68 y.o. female who presents for Fall ([facility] DOI-6/23/19-does feel better)    DOI 6/23/19: 67 yo female presents with right hip and low back pain. She slipped on liquid that was on the tile and fell on her bottom. She noted pain in the right hip and mid back which worsened throughout the day.She continues to have right hip and low to mid back lonny, which she states is improving.  Patient at last occupational health visit was referred to physical therapy however patient has yet to hear from scheduling to set up the appointment.  Patient has been doing home exercises which does seem to be helping.  She does not have any radiating pain. Pain is midline lower thoracic and lumbar.  Patient stating that she would like to trial full duty without restrictions and see how she feels for a week.    HPI  Review of Systems   Constitutional: Negative for chills and fever.   HENT: Negative for sore throat.    Eyes: Negative for pain.   Respiratory: Negative for shortness of breath.    Cardiovascular: Negative for chest pain.   Gastrointestinal: Negative for nausea and vomiting.   Genitourinary: Negative for hematuria.   Musculoskeletal: Positive for back pain, falls and joint pain. Negative for myalgias.   Skin: Negative for rash.   Neurological: Negative for dizziness.     Allergies   Allergen Reactions   • Aspirin      Stomach pain   • Ibuprofen      Stomach pain   • Tape Rash      Objective:   /86 (BP Location: Left arm)   Pulse 89   Temp 36.5 °C (97.7 °F) (Temporal)   Resp 18   Ht 1.524 m (5')   Wt 77.1 kg (170 lb)   LMP 09/25/2007   SpO2 94%   BMI 33.20 kg/m²   Physical Exam   Constitutional: She is oriented to person, place, and time. She appears well-developed and well-nourished. No distress.   HENT:   Head: Normocephalic and atraumatic.   Eyes: Pupils are equal, round, and reactive to light. Conjunctivae and EOM are normal.   Cardiovascular: Normal rate and regular  rhythm.    No murmur heard.  Pulmonary/Chest: Effort normal and breath sounds normal. No respiratory distress.   Abdominal: Soft. She exhibits no distension. There is no tenderness.   Musculoskeletal:        Right hip: She exhibits normal range of motion, normal strength, no tenderness and no deformity.        Lumbar back: She exhibits decreased range of motion, pain and spasm. She exhibits no tenderness.   Neurological: She is alert and oriented to person, place, and time. She has normal reflexes. No sensory deficit.   Skin: Skin is warm and dry.   Psychiatric: She has a normal mood and affect.     Spine: No gross deformity, no midline tenderness.  Paraspinal muscle spasms elicited.  Slight antalgic gait, slight decrease in range of motion with flexion and extension.  DTRs +2, sensation intact distally, neurovascular intact.  Right hip: No gross deformity, mildly tender patient, +AROM.    Assessment/Plan:   1. Hip strain, right, subsequent encounter    2. Strain of lumbar region, subsequent encounter    3. Thoracic myofascial strain, subsequent encounter  Patient having an improvement of symptoms however, would like to see patient in physical therapy. Information provided to contact scheduling to set up appointment.  Will trial full duty without restrictions.  Advised to continue home stretching, anti-inflammatories, gabapentin.  We will see patient back in a week for reevaluation.  Differential diagnosis, natural history, supportive care, and indications for immediate follow-up discussed.

## 2019-07-26 NOTE — LETTER
[facility]  [address] [address]  Phone:  [phone] - Fax:  [phone]   [facility] Progress Report and Disability Certification  Date of Service: 7/26/2019   No Show:  No  Date / Time of Next Visit: 8/2/2019 11:20AM   Claim Information   Patient Name: Kayla Jensen  Claim Number:     Employer: [employer]  Date of Injury: 7/10/2014     Insurer / TPA: [employer]  ID / SSN:     Occupation:  (Slot Service Rep)  Diagnosis: Diagnoses of Hip strain, right, subsequent encounter, Strain of lumbar region, subsequent encounter, and Thoracic myofascial strain, subsequent encounter were pertinent to this visit.    Medical Information   Related to Industrial Injury? Yes    Subjective Complaints:  DOI 6/23/19: 67 yo female presents with right hip and low back pain. She slipped on liquid that was on the tile and fell on her bottom. She noted pain in the right hip and mid back which worsened throughout the day.She continues to have right hip and low to mid back lonny, which she states is improving.  Patient at last occupational health visit was referred to physical therapy however patient has yet to hear from scheduling to set up the appointment.  Patient has been doing home exercises which does seem to be helping.  She does not have any radiating pain. Pain is midline lower thoracic and lumbar.  Patient stating that she would like to trial full duty without restrictions and see how she feels for a week.    Objective Findings: Spine: No gross deformity, no midline tenderness.  Paraspinal muscle spasms elicited.  Slight antalgic gait, slight decrease in range of motion with flexion and extension.  DTRs +2, sensation intact distally, neurovascular intact.  Right hip: No gross deformity, mildly tender patient, +AROM.    Pre-Existing Condition(s):     Assessment:   Condition Improved    Status: Additional Care Required  Permanent  Disability:No    Plan:      Diagnostics:      Comments:       Disability Information   Status: Released to Full Duty    From:  7/26/2019  Through: 8/2/2019 Restrictions are:     Physical Restrictions   Sitting:    Standing:    Stooping:    Bending:      Squatting:    Walking:    Climbing:    Pushing:      Pulling:    Other:    Reaching Above Shoulder (L):   Reaching Above Shoulder (R):       Reaching Below Shoulder (L):    Reaching Below Shoulder (R):      Not to exceed Weight Limits   Carrying(hrs):   Weight Limit(lb):   Lifting(hrs):   Weight  Limit(lb):     Comments: Patient having an improvement of symptoms however, would like to see patient in physical therapy. Information provided to contact scheduling to set up appointment.  Will trial full duty without restrictions.  Advised to continue home stretching, anti-inflammatories, gabapentin.  We will see patient back in a week for reevaluation.    Repetitive Actions   Hands: i.e. Fine Manipulations from Grasping:     Feet: i.e. Operating Foot Controls:     Driving / Operate Machinery:     Physician Name: KATY Cooper Physician Signature: CHANCE Rivera e-Signature: Dr. Ernesto Garcia, Medical Director   Clinic Name / Location: [address] [place] [address] Clinic Phone Number: Dept: [phone]   Appointment Time: 10:30 Am Visit Start Time: 10:53 AM   Check-In Time:  10:02 Am Visit Discharge Time:  11:29AM   Original-Treating Physician or Chiropractor    Page 2-Insurer/TPA    Page 3-Employer    Page 4-Employee

## 2019-08-02 ENCOUNTER — OCCUPATIONAL MEDICINE (OUTPATIENT)
Dept: URGENT CARE | Facility: CLINIC | Age: 69
End: 2019-08-02
Payer: COMMERCIAL

## 2019-08-02 VITALS
BODY MASS INDEX: 33.38 KG/M2 | HEART RATE: 92 BPM | DIASTOLIC BLOOD PRESSURE: 80 MMHG | WEIGHT: 170 LBS | SYSTOLIC BLOOD PRESSURE: 124 MMHG | OXYGEN SATURATION: 97 % | RESPIRATION RATE: 15 BRPM | HEIGHT: 60 IN | TEMPERATURE: 97.2 F

## 2019-08-02 DIAGNOSIS — S76.011D HIP STRAIN, RIGHT, SUBSEQUENT ENCOUNTER: ICD-10-CM

## 2019-08-02 DIAGNOSIS — M54.41 ACUTE RIGHT-SIDED LOW BACK PAIN WITH RIGHT-SIDED SCIATICA: ICD-10-CM

## 2019-08-02 DIAGNOSIS — S29.019D THORACIC MYOFASCIAL STRAIN, SUBSEQUENT ENCOUNTER: ICD-10-CM

## 2019-08-02 PROCEDURE — 99214 OFFICE O/P EST MOD 30 MIN: CPT | Mod: 29 | Performed by: PHYSICIAN ASSISTANT

## 2019-08-02 RX ORDER — METHYLPREDNISOLONE 4 MG/1
TABLET ORAL
Qty: 21 TAB | Refills: 0 | Status: SHIPPED | OUTPATIENT
Start: 2019-08-02 | End: 2019-10-07

## 2019-08-02 ASSESSMENT — ENCOUNTER SYMPTOMS
COUGH: 0
FEVER: 0
SHORTNESS OF BREATH: 0
MYALGIAS: 1
BACK PAIN: 1
SENSORY CHANGE: 1
PALPITATIONS: 0
TINGLING: 0
NAUSEA: 0
VOMITING: 0
CHILLS: 0
ABDOMINAL PAIN: 0
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
## 2019-08-02 NOTE — LETTER
[address] [address]  Phone:  [phone] - Fax:  [phone]   [facility] Progress Report and Disability Certification  Date of Service: 8/2/2019   No Show:  No  Date / Time of Next Visit: 8/16/2019 @ 10AM   Claim Information   Patient Name: Kayla Jensen  Claim Number:     Employer: [employer]  Date of Injury: 7/10/2014     Insurer / TPA: [employer]  ID / SSN:     Occupation:  (Slot Service Rep)  Diagnosis: Diagnoses of Hip strain, right, subsequent encounter, Acute right-sided low back pain with right-sided sciatica, and Thoracic myofascial strain, subsequent encounter were pertinent to this visit.    Medical Information   Related to Industrial Injury? Yes    Subjective Complaints:  DOI: 6/23/19. The patient is here for a follow-up on right hip and right lower back pain. She slipped on liquid while at work. She states she continues to have pain after her shift. She also reports right lower leg numbness after a full days work. She states she is tolerating full duty and does not want restrictions. She cannot take NSAIDS due to history of Ulcers.  She continues to have issues and is waiting for PT. She denies weakness or urinary or bowel incontinence.    Objective Findings: Vitals reviewed.  Lumbar spine: No gross deformity. No midline TTP. Right lumbar paraspinal muscle spasm noted. Slight antalgic gait. DTRS 2+. Distal n/v intact,.   Pre-Existing Condition(s):     Assessment:   Condition Same    Status: Additional Care Required  Permanent Disability:No    Plan: PTMedication  Comments:RX Medrol    Diagnostics:      Comments:       Disability Information   Status: Released to Full Duty    From:  8/2/2019  Through: 8/16/2019 Restrictions are:     Physical Restrictions   Sitting:    Standing:    Stooping:    Bending:      Squatting:    Walking:    Climbing:    Pushing:      Pulling:    Other:    Reaching Above  Shoulder (L):   Reaching Above Shoulder (R):       Reaching Below Shoulder (L):    Reaching Below Shoulder (R):      Not to exceed Weight Limits   Carrying(hrs):   Weight Limit(lb):   Lifting(hrs):   Weight  Limit(lb):     Comments: Continue full duty. PT pending. Trial of Medrol. RTC in 2 weeks. Consider MRI if symptoms persist and PT does not help.    Repetitive Actions   Hands: i.e. Fine Manipulations from Grasping:     Feet: i.e. Operating Foot Controls:     Driving / Operate Machinery:     Physician Name: Jonathan Laura P.A.-C. Physician Signature: JONATHAN Banda P.A.-C. e-Signature: Dr. Ernesto Garcia, Medical Director   Clinic Name / Location: [address] [address] Clinic Phone Number: Dept: [phone]   Appointment Time: 11:30 Am Visit Start Time: 11:14 AM   Check-In Time:  11:10 Am Visit Discharge Time:  11:50 AM   Original-Treating Physician or Chiropractor    Page 2-Insurer/TPA    Page 3-Employer    Page 4-Employee

## 2019-08-06 ENCOUNTER — TELEPHONE (OUTPATIENT)
Dept: MEDICAL GROUP | Facility: PHYSICIAN GROUP | Age: 69
End: 2019-08-06

## 2019-08-06 NOTE — TELEPHONE ENCOUNTER
Future Appointments       Provider Department Center    8/7/2019 7:45 AM Roque Neil M.D. [facility]     8/16/2019 10:00 AM [facility]RG[facility]    8/20/2019 7:45 AM Madi Wilkinson, PT, DPT [facility]    8/22/2019 7:45 AM Madi Wilkinson PT, DPT [facility]    8/30/2019 7:45 AM Madi Wilkinson PT, DPT [facility]    9/3/2019 7:45 AM Madi Wilkinson PT, DPT [facility]    9/16/2019 7:20 AM KATY Castano [facility]    9/17/2019 7:45 AM Madi Wilkinson PT, DPT [facility]    9/20/2019 7:30 AM Madi Wilkinson PT, DPT [facility]    9/24/2019 7:45 AM Madi Wilkinson PT, DPT [facility]    9/27/2019 7:30 AM Madi Wilkinson PT, DPT [facility]    10/23/2019 7:40 AM KATY Sahni [facility]         ESTABLISHED PATIENT PRE-VISIT PLANNING     Patient was NOT contacted to complete PVP.    1.  Reviewed notes from the last few office visits within the medical group: Yes 7/15/19    2.  If any orders were placed at last visit or intended to be done for this visit (i.e. 6 mos follow-up), do we have Results/Consult Notes?        •  Labs - Labs ordered, NOT completed. Patient advised to complete prior to next appointment.       •  Imaging - Imaging was not ordered at last office visit.       •  Referrals - No referrals were ordered at last office visit.    3. Is this appointment scheduled as a Hospital Follow-Up? No    4.  Immunizations were updated in [facility] using WebIZ?: Yes       •  Web Iz Recommendations: FLU, TD, VARICELLA (Chicken Pox)  and SHINGRIX (Shingles)    5.  Patient is due for the  following Health Maintenance Topics:   Health Maintenance Due   Topic Date Due   • HEPATITIS C SCREENING  1950   • URINE DRUG SCREEN  05/09/2019   • IMM ZOSTER VACCINES (3 of 3) 07/23/2019     6. Orders for overdue Health Maintenance topics pended in Pre-Charting? NO    7.  AHA (MDX) form printed for Provider? No, already completed    8.  Patient was NOT informed to arrive 15 min prior to their scheduled appointment and bring in their medication bottles.

## 2019-08-07 ENCOUNTER — OFFICE VISIT (OUTPATIENT)
Dept: CARDIOLOGY | Facility: MEDICAL CENTER | Age: 69
End: 2019-08-07
Payer: MEDICARE

## 2019-08-07 VITALS
WEIGHT: 173 LBS | HEIGHT: 63 IN | BODY MASS INDEX: 30.65 KG/M2 | HEART RATE: 82 BPM | OXYGEN SATURATION: 95 % | DIASTOLIC BLOOD PRESSURE: 66 MMHG | SYSTOLIC BLOOD PRESSURE: 124 MMHG

## 2019-08-07 DIAGNOSIS — E78.5 DYSLIPIDEMIA: ICD-10-CM

## 2019-08-07 DIAGNOSIS — I31.39 PERICARDIAL EFFUSION: ICD-10-CM

## 2019-08-07 DIAGNOSIS — I71.21 ASCENDING AORTIC ANEURYSM (HCC): ICD-10-CM

## 2019-08-07 PROCEDURE — 99204 OFFICE O/P NEW MOD 45 MIN: CPT | Performed by: INTERNAL MEDICINE

## 2019-08-07 ASSESSMENT — ENCOUNTER SYMPTOMS
NECK PAIN: 1
SHORTNESS OF BREATH: 1
EYE DISCHARGE: 0
BLURRED VISION: 0
HEMOPTYSIS: 0
FEVER: 0
VOMITING: 0
WHEEZING: 0
NAUSEA: 0
COUGH: 0
EYE PAIN: 0
BACK PAIN: 1
CHILLS: 0
SPEECH CHANGE: 0
PALPITATIONS: 0
LOSS OF CONSCIOUSNESS: 0
DEPRESSION: 0
BRUISES/BLEEDS EASILY: 1
HEARTBURN: 1
NERVOUS/ANXIOUS: 0
MYALGIAS: 0
ABDOMINAL PAIN: 0
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
## 2019-08-14 ENCOUNTER — TELEPHONE (OUTPATIENT)
Dept: MEDICAL GROUP | Facility: PHYSICIAN GROUP | Age: 69
End: 2019-08-14

## 2019-08-14 DIAGNOSIS — Z12.11 SCREEN FOR COLON CANCER: ICD-10-CM

## 2019-08-14 NOTE — TELEPHONE ENCOUNTER
FELA to call back, am going to see if she would like a referral to do a colonoscopy that is due in 11/2019. Will try to reach her later    Hieu Douglas Ass't

## 2019-08-14 NOTE — TELEPHONE ENCOUNTER
1. Caller Name: Kayla Jensen  [phone] (home)                                                  Patient approves a detailed voicemail message: N\A    2. SPECIFIC Action To Be Taken: Referral pending, please sign.    3. Diagnosis/Clinical Reason for Request: Colorectal Cancer Screening     4. Specialty & Provider Name/Lab/Imaging Location: [facility]    5. Is appointment scheduled for requested order/referral: no    Patient was informed they will receive a return phone call from the office ONLY if there are any questions before processing their request. Advised to call back if they haven't received a call from the referral department in 5 days.

## 2019-08-14 NOTE — TELEPHONE ENCOUNTER
Colorectal Care Gap Outreach    1. Confirmed patient is between the ages of 50-75: YES     2. Confirmed that patient IS overdue or due soon for colorectal cancer screening: YES     3. Were orders placed within the last 12 months to complete screening: NO     Phone Number Called: [phone]  Call outcome: Patient would like a Referral to GI for Colonoscopy.                                          SPECIFIC Action To Be Taken: Orders pending, please sign.    _____________________________________________________________________    Colon Cancer Screening Guidelines:     Important: If patient has any history of colon polyps or family history of colorectal cancer, FIT and Cologuard are NOT appropriate options. A colonoscopy is the recommended test for this set of patients.    • Colonoscopy  o Always recommend colonoscopy first.   o A colonoscopy is recommended over the other tests because it provides direct visualization of the colon and if there are small polyps these can also be removed with one procedure.  o If negative and no family history, could be cleared for 10 years.     • Cologuard/FIT  o Cologuard is completed once every 3 years.  o FIT is completed annually.  o If positive, Cologuard and FIT will require a diagnostic colonoscopy. Screening colonoscopies are classically covered by insurances, however, diagnostic colonoscopies may result in a bill.

## 2019-08-16 ENCOUNTER — OCCUPATIONAL MEDICINE (OUTPATIENT)
Dept: URGENT CARE | Facility: CLINIC | Age: 69
End: 2019-08-16
Payer: COMMERCIAL

## 2019-08-16 VITALS
HEIGHT: 63 IN | TEMPERATURE: 97.7 F | BODY MASS INDEX: 31.4 KG/M2 | DIASTOLIC BLOOD PRESSURE: 92 MMHG | HEART RATE: 74 BPM | OXYGEN SATURATION: 93 % | SYSTOLIC BLOOD PRESSURE: 130 MMHG | WEIGHT: 177.2 LBS | RESPIRATION RATE: 16 BRPM

## 2019-08-16 DIAGNOSIS — M54.41 ACUTE RIGHT-SIDED BACK PAIN WITH SCIATICA: ICD-10-CM

## 2019-08-16 PROCEDURE — 99214 OFFICE O/P EST MOD 30 MIN: CPT | Mod: 29 | Performed by: PHYSICIAN ASSISTANT

## 2019-08-16 RX ORDER — BACLOFEN 10 MG/1
10 TABLET ORAL 3 TIMES DAILY
Qty: 30 TAB | Refills: 0 | Status: SHIPPED | OUTPATIENT
Start: 2019-08-16 | End: 2019-08-26

## 2019-08-16 ASSESSMENT — ENCOUNTER SYMPTOMS
TINGLING: 0
VOMITING: 0
CHILLS: 0
SHORTNESS OF BREATH: 0
CLAUDICATION: 0
ORTHOPNEA: 0
NAUSEA: 0
DIZZINESS: 0
LOSS OF CONSCIOUSNESS: 0
SEIZURES: 0
FEVER: 0
SENSORY CHANGE: 0
COUGH: 0
HEADACHES: 0
DIARRHEA: 0
TREMORS: 0
DOUBLE VISION: 0
WEAKNESS: 0
SPEECH CHANGE: 0
ABDOMINAL PAIN: 0
BLURRED VISION: 0
PALPITATIONS: 0
FOCAL WEAKNESS: 0
BACK PAIN: 1

## 2019-08-16 NOTE — PROGRESS NOTES
Subjective:      Kayla Jensen is a 68 y.o. female who presents with Other (wc fv, feeling sore,hip injury )         HPI  DOI: 6/23/19. Visit #3.  The patient is here for a follow-up on right hip and right lower back pain. She slipped on liquid while at work. She states she continues to have pain after her shift. She also reports right lower leg numbness after a full days work. She states she is tolerating full duty and does not want restrictions. She cannot take NSAIDS due to history of Ulcers.  She continues to have issues and has PT appt next week. She denies weakness or urinary or bowel incontinence.      Review of Systems   Constitutional: Negative for chills and fever.   Eyes: Negative for blurred vision and double vision.   Respiratory: Negative for cough and shortness of breath.    Cardiovascular: Negative for chest pain, palpitations, orthopnea, claudication and leg swelling.   Gastrointestinal: Negative for abdominal pain, diarrhea, nausea and vomiting.   Musculoskeletal: Positive for back pain.   Skin: Negative for rash.   Neurological: Negative for dizziness, tingling, tremors, sensory change, speech change, focal weakness, seizures, loss of consciousness, weakness and headaches.   All other systems reviewed and are negative.    PMH:  has a past medical history of Actinic keratoses (11/6/2018), Allergic rhinitis, Allergy, Anesthesia (07/2018), Arthritis (07/2018), Back pain, Breath shortness (07/2018), Bronchitis, Cataract, Chickenpox, Dental disorder, Emphysema of lung (HCC) (07/2018), GERD (gastroesophageal reflux disease), Northern [place] measles, Heart burn, Hyperlipidemia, Influenza, Lentigo (11/6/2018), Nasal drainage, Neoplasm of uncertain behavior of skin of cheek (9/25/2018), Neoplasm of uncertain behavior of skin of chest (11/6/2018), Personal history of venous thrombosis and embolism (1982), Pneumonia, Renal disorder, Skin lesion of right lower extremity (9/25/2018), Sleep apnea (07/2018), and  Unspecified hemorrhagic conditions.  MEDS:   Current Outpatient Medications:   •  baclofen (LIORESAL) 10 MG Tab, Take 1 Tab by mouth 3 times a day for 10 days., Disp: 30 Tab, Rfl: 0  •  Nasal Wash (ALKALOL) Solution, Spray  in nose., Disp: , Rfl:   •  methylPREDNISolone (MEDROL DOSEPAK) 4 MG Tablet Therapy Pack, Take as directed on package. 1 pack., Disp: 21 Tab, Rfl: 0  •  tiotropium (SPIRIVA) 18 MCG Cap, Inhale 1 Cap by mouth every day., Disp: 90 Cap, Rfl: 3  •  hydrOXYzine HCl (ATARAX) 25 MG Tab, Take 1 Tab by mouth 3 times a day as needed for Itching., Disp: 60 Tab, Rfl: 0  •  [START ON 8/22/2019] tramadol (ULTRAM) 50 MG Tab, Take 1-2 Tabs by mouth 1 time daily as needed for Severe Pain (6 hours apart from Tylenol #3) for up to 30 days., Disp: 60 Tab, Rfl: 0  •  budesonide-formoterol (SYMBICORT) 160-4.5 MCG/ACT Aerosol, Inhale 2 Puffs by mouth 2 Times a Day. (Patient not taking: Reported on 8/7/2019), Disp: 2 Inhaler, Rfl: 11  •  albuterol (PROAIR HFA) 108 (90 Base) MCG/ACT Aero Soln inhalation aerosol, Inhale 2 Puffs by mouth every 6 hours as needed for Shortness of Breath., Disp: 8.5 g, Rfl: 3  •  Cholecalciferol (VITAMIN D3) 5000 units Cap, Take 1 Cap by mouth every day., Disp: , Rfl:   •  gabapentin (NEURONTIN) 300 MG Cap, Take 3 Caps by mouth 2 Times a Day., Disp: 540 Cap, Rfl: 3  •  fexofenadine-pseudoephedrine (ALLEGRA-D)  MG per tablet, TAKE 1 TABLET BY MOUTH 2 TIMES A DAY. (Patient not taking: Reported on 8/2/2019), Disp: 60 Tab, Rfl: 3  •  eucalyptus/peppermint oil (PONARIS NASAL) Solution, Spray 10 Drops in nose every 6 hours as needed., Disp: 1 Bottle, Rfl: 2  ALLERGIES:   Allergies   Allergen Reactions   • Aspirin      Stomach pain   • Ibuprofen      Stomach pain   • Tape Rash     SURGHX:   Past Surgical History:   Procedure Laterality Date   • KNEE ARTHROSCOPY Right 7/25/2018    Procedure: KNEE ARTHROSCOPY;  Surgeon: Goldy Tran M.D.;  Location: SURGERY [facility];  Service:  "Orthopedics   • MEDIAL MENISCECTOMY  7/25/2018    Procedure: MEDIAL MENISCECTOMY- PARTIAL;  Surgeon: Goldy Tran M.D.;  Location: [facility];  Service: Orthopedics   • CHONDROPLASTY  7/25/2018    Procedure: CHONDROPLASTY ;  Surgeon: Goldy Tran M.D.;  Location: [facility];  Service: Orthopedics   • CATARACT EXTRACTION WITH IOL Bilateral 2013   • NASAL SEPTAL RECONSTRUCTION  2013    sinus surgery   • KNEE ARTHROPLASTY TOTAL  3/18/2009    Performed by SURI PARK at [facility]   • TUBE & ECTOPIC PREG., REMOVAL  1982   • CYST EXCISION Right 1970    right breast   • ARTHROSCOPY, KNEE     • HYSTERECTOMY, VAGINAL     • DC THROAT SURGERY PROCEDURE UNLISTED     • SINUSCOPE     • TONSILLECTOMY     • TONSILLECTOMY       SOCHX:  reports that she quit smoking about 17 years ago. Her smoking use included cigarettes. She has a 60.00 pack-year smoking history. She has never used smokeless tobacco. She reports that she drinks alcohol. She reports that she does not use drugs.  FH: Family history was reviewed, no pertinent findings to report       Objective:     /92   Pulse 74   Temp 36.5 °C (97.7 °F) (Temporal)   Resp 16   Ht 1.6 m (5' 3\")   Wt 80.4 kg (177 lb 3.2 oz)   LMP 09/25/2007   SpO2 93%   BMI 31.39 kg/m²      Physical Exam    Vitals reviewed.  Lumbar spine: No gross deformity. No midline TTP. Right lumbar paraspinal muscle spasm noted. Slight antalgic gait. DTRS 2+. Distal n/v intact,.       Assessment/Plan:     1. Acute right-sided back pain with sciatica  - D39  - Follow up with Occupational health  - baclofen (LIORESAL) 10 MG Tab; Take 1 Tab by mouth 3 times a day for 10 days.  Dispense: 30 Tab; Refill: 0    "

## 2019-08-16 NOTE — LETTER
[address] [place] [address]  Phone:  [phone] - Fax:  [phone]   [facility] Progress Report and Disability Certification  Date of Service: 8/16/2019   No Show:  No  Date / Time of Next Visit: 8/26/2019 @ 8:45AM Occ   Claim Information   Patient Name: Kayla Jensen  Claim Number:     Employer: [employer]  Date of Injury: 6/23/2019     Insurer / TPA: [employer]  ID / SSN:     Occupation:   Diagnosis: The encounter diagnosis was Acute right-sided back pain with sciatica.    Medical Information   Related to Industrial Injury? Yes    Subjective Complaints:  DOI: 6/23/19. Visit #3.  The patient is here for a follow-up on right hip and right lower back pain. She slipped on liquid while at work. She states she continues to have pain after her shift. She also reports right lower leg numbness after a full days work. She states she is tolerating full duty and does not want restrictions. She cannot take NSAIDS due to history of Ulcers.  She continues to have issues and has PT appt next week. She denies weakness or urinary or bowel incontinence.    Objective Findings: Vitals reviewed.  Lumbar spine: No gross deformity. No midline TTP. Right lumbar paraspinal muscle spasm noted. Slight antalgic gait. DTRS 2+. Distal n/v intact,.   Pre-Existing Condition(s):     Assessment:   Condition Same    Status: Additional Care Required  Permanent Disability:No    Plan:      Diagnostics:      Comments:       Disability Information   Status: Released to Full Duty    From:  8/16/2019  Through: 8/26/2019 Restrictions are: Temporary   Physical Restrictions   Sitting:    Standing:    Stooping:    Bending:      Squatting:    Walking:    Climbing:    Pushing:      Pulling:    Other:    Reaching Above Shoulder (L):   Reaching Above Shoulder (R):       Reaching Below Shoulder (L):    Reaching Below Shoulder (R):      Not to exceed Weight Limits      Carrying(hrs):   Weight Limit(lb):   Lifting(hrs):   Weight  Limit(lb):     Comments: Recommend work as tolerated.  Follow up with Occupational Health in 9 days.    Repetitive Actions   Hands: i.e. Fine Manipulations from Grasping:     Feet: i.e. Operating Foot Controls:     Driving / Operate Machinery:     Physician Name: Madi Huber P.A.-C. Physician Signature: flora-SignMADI HUBER P.A.-C. e-Signature: Dr. Ernesto Garcia, Medical Director   Clinic Name / Location: [address] [address] Clinic Phone Number: Dept: [phone]   Appointment Time: 10:00 Am Visit Start Time: 10:24 AM   Check-In Time:  10:04 Am Visit Discharge Time:  11:13 AM   Original-Treating Physician or Chiropractor    Page 2-Insurer/TPA    Page 3-Employer    Page 4-Employee

## 2019-08-19 ENCOUNTER — HOSPITAL ENCOUNTER (OUTPATIENT)
Dept: CARDIOLOGY | Facility: MEDICAL CENTER | Age: 69
End: 2019-08-19
Attending: INTERNAL MEDICINE
Payer: MEDICARE

## 2019-08-19 DIAGNOSIS — I71.21 ASCENDING AORTIC ANEURYSM (HCC): ICD-10-CM

## 2019-08-19 DIAGNOSIS — I31.39 PERICARDIAL EFFUSION: ICD-10-CM

## 2019-08-19 LAB
LV EJECT FRACT  99904: 55
LV EJECT FRACT MOD 2C 99903: 63.03
LV EJECT FRACT MOD 4C 99902: 51.16
LV EJECT FRACT MOD BP 99901: 41.03

## 2019-08-19 PROCEDURE — 93306 TTE W/DOPPLER COMPLETE: CPT | Mod: 26 | Performed by: INTERNAL MEDICINE

## 2019-08-19 PROCEDURE — 93306 TTE W/DOPPLER COMPLETE: CPT

## 2019-08-20 ENCOUNTER — PHYSICAL THERAPY (OUTPATIENT)
Dept: PHYSICAL THERAPY | Facility: REHABILITATION | Age: 69
End: 2019-08-20
Attending: PREVENTIVE MEDICINE
Payer: COMMERCIAL

## 2019-08-20 DIAGNOSIS — S39.012D STRAIN OF LUMBAR REGION, SUBSEQUENT ENCOUNTER: ICD-10-CM

## 2019-08-20 DIAGNOSIS — S29.019D THORACIC MYOFASCIAL STRAIN, SUBSEQUENT ENCOUNTER: ICD-10-CM

## 2019-08-20 DIAGNOSIS — S76.011D HIP STRAIN, RIGHT, SUBSEQUENT ENCOUNTER: ICD-10-CM

## 2019-08-20 PROCEDURE — 97161 PT EVAL LOW COMPLEX 20 MIN: CPT

## 2019-08-20 PROCEDURE — 97110 THERAPEUTIC EXERCISES: CPT

## 2019-08-20 ASSESSMENT — ENCOUNTER SYMPTOMS
PAIN SCALE AT LOWEST: 4
PAIN SCALE AT HIGHEST: 9
PAIN SCALE: 6
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
## 2019-08-22 ENCOUNTER — APPOINTMENT (OUTPATIENT)
Dept: PHYSICAL THERAPY | Facility: REHABILITATION | Age: 69
End: 2019-08-22
Attending: PREVENTIVE MEDICINE
Payer: COMMERCIAL

## 2019-08-23 ENCOUNTER — TELEPHONE (OUTPATIENT)
Dept: OCCUPATIONAL MEDICINE | Facility: CLINIC | Age: 69
End: 2019-08-23

## 2019-08-26 ENCOUNTER — OCCUPATIONAL MEDICINE (OUTPATIENT)
Dept: OCCUPATIONAL MEDICINE | Facility: CLINIC | Age: 69
End: 2019-08-26
Payer: COMMERCIAL

## 2019-08-26 VITALS
DIASTOLIC BLOOD PRESSURE: 88 MMHG | OXYGEN SATURATION: 95 % | HEART RATE: 75 BPM | WEIGHT: 176 LBS | SYSTOLIC BLOOD PRESSURE: 132 MMHG | BODY MASS INDEX: 31.18 KG/M2 | RESPIRATION RATE: 16 BRPM | HEIGHT: 63 IN | TEMPERATURE: 98.1 F

## 2019-08-26 DIAGNOSIS — S16.1XXD STRAIN OF NECK MUSCLE, SUBSEQUENT ENCOUNTER: ICD-10-CM

## 2019-08-26 DIAGNOSIS — S76.011D HIP STRAIN, RIGHT, SUBSEQUENT ENCOUNTER: ICD-10-CM

## 2019-08-26 DIAGNOSIS — S39.012D STRAIN OF LUMBAR REGION, SUBSEQUENT ENCOUNTER: ICD-10-CM

## 2019-08-26 DIAGNOSIS — S29.019D THORACIC MYOFASCIAL STRAIN, SUBSEQUENT ENCOUNTER: ICD-10-CM

## 2019-08-26 PROCEDURE — 99214 OFFICE O/P EST MOD 30 MIN: CPT | Performed by: PREVENTIVE MEDICINE

## 2019-08-26 RX ORDER — TIZANIDINE 4 MG/1
4 TABLET ORAL
Qty: 20 TAB | Refills: 0 | Status: SHIPPED | OUTPATIENT
Start: 2019-08-26 | End: 2019-09-16

## 2019-08-26 NOTE — LETTER
[address],   [address] [place] [address]  Phone:  [phone] - Fax:  [phone]   [facility] Progress Report and Disability Certification  Date of Service: 8/26/2019   No Show:  No  Date / Time of Next Visit: 9/16/2019 @ 7:45am   Claim Information   Patient Name: Kayla Jensen  Claim Number:     Employer: [employer]  Date of Injury: 6/23/2019     Insurer / TPA: [employer]  ID / SSN:     Occupation:   Diagnosis: Diagnoses of Hip strain, right, subsequent encounter, Strain of lumbar region, subsequent encounter, Thoracic myofascial strain, subsequent encounter, and Strain of neck muscle, subsequent encounter were pertinent to this visit.    Medical Information   Related to Industrial Injury? Yes    Subjective Complaints:  DOI 6/23/19: 67 yo female presents with right hip and low back pain. She slipped on liquid that was on the tile and fell on her bottom. She noted pain in the right hip and mid back which worsened throughout the day. She was seen in  and x-rays were obtained and negative for acute fracture dislocation.  Patient states that overall symptoms are unchanged.  She states that she continues to have lower back pain into the right hip and pain going down to the calf.  This makes it difficult to walk.  However she is able to do her full duty job.  She states she also has a pain extending up to her upper back/lower neck on the right side and is not getting tingling in the right hand and arm.  She had her first visit to physical therapy last week and is scheduled through the end of September.  She was given baclofen but did not get much relief from that.   Objective Findings: Lumbothoracic: No gross deformity.  Diffuse tenderness bilateral paraspinal musculature from lower thoracic to lower lumbar spine, more so on the right.  Essentially full range of motion with mild discomfort in flexion and rotation  bilaterally.  Slight weakness right plantar/dorsiflexion and knee extension.  Slight antalgic gait.  Cervical: No gross deformity.  Right lower cervical paraspinal musculature tenderness and upper trapezius tenderness.  Slight decrease in rotation right.  Upper extremity reflexes intact.  Spurling's test negative on right.  Right hip: No gross deformity. tenderness diffusely over the right hip.  Somewhat decreased hip flexion.   Pre-Existing Condition(s):     Assessment:   Condition Same    Status: Additional Care Required  Permanent Disability:No    Plan:      Diagnostics:      Comments:  Upper back pain seems to be more lower cervical pain versus upper thoracic.  Now with cervical radiculopathy  Continue physical therapy  Referral for MRI lumbar and thoracic spine  Prescribed tizanidine  Full duty  Follow-up 3 weeks    Disability Information   Status: Released to Full Duty    From:  8/26/2019  Through: 9/16/2019 Restrictions are:     Physical Restrictions   Sitting:    Standing:    Stooping:    Bending:      Squatting:    Walking:    Climbing:    Pushing:      Pulling:    Other:    Reaching Above Shoulder (L):   Reaching Above Shoulder (R):       Reaching Below Shoulder (L):    Reaching Below Shoulder (R):      Not to exceed Weight Limits   Carrying(hrs):   Weight Limit(lb):   Lifting(hrs):   Weight  Limit(lb):     Comments:      Repetitive Actions   Hands: i.e. Fine Manipulations from Grasping:     Feet: i.e. Operating Foot Controls:     Driving / Operate Machinery:     Physician Name: Bill Shah D.O. Physician Signature: BILL Mcleod D.O. e-Signature: Dr. Ernesto Garcia, Medical Director   Clinic Name / Location: [address],   [address] [place] [id number] Clinic Phone Number: Dept: [phone]   Appointment Time: 8:45 Am Visit Start Time: 7:58 AM   Check-In Time:  7:54 Am Visit Discharge Time:  9am   Original-Treating Physician or Chiropractor    Page  2-Insurer/TPA    Page 3-Employer    Page 4-Employee

## 2019-08-26 NOTE — PROGRESS NOTES
"Subjective:      Kayla Jensen is a 68 y.o. female who presents with Other (WC DOI 6/23/19 back injury, feeling the same, room 16)      DOI 6/23/19: 67 yo female presents with right hip and low back pain. She slipped on liquid that was on the tile and fell on her bottom. She noted pain in the right hip and mid back which worsened throughout the day. She was seen in  and x-rays were obtained and negative for acute fracture dislocation.  Patient states that overall symptoms are unchanged.  She states that she continues to have lower back pain into the right hip and pain going down to the calf.  This makes it difficult to walk.  However she is able to do her full duty job.  She states she also has a pain extending up to her upper back/lower neck on the right side and is not getting tingling in the right hand and arm.  She had her first visit to physical therapy last week and is scheduled through the end of September.  She was given baclofen but did not get much relief from that.     HPI    ROS  SOCHX: Works as a  at the [employer]   FH: No pertinent family history to this problem.     Objective:     /88   Pulse 75   Temp 36.7 °C (98.1 °F)   Resp 16   Ht 1.6 m (5' 3\")   Wt 79.8 kg (176 lb)   LMP 09/25/2007   SpO2 95%   BMI 31.18 kg/m²      Physical Exam    Lumbothoracic: No gross deformity.  Diffuse tenderness bilateral paraspinal musculature from lower thoracic to lower lumbar spine, more so on the right.  Essentially full range of motion with mild discomfort in flexion and rotation bilaterally.  Slight weakness right plantar/dorsiflexion and knee extension.  Slight antalgic gait.  Cervical: No gross deformity.  Right lower cervical paraspinal musculature tenderness and upper trapezius tenderness.  Slight decrease in rotation right.  Upper extremity reflexes intact.  Spurling's test negative on right.  Right hip: No gross deformity. tenderness diffusely over the right hip.  Somewhat " decreased hip flexion.       Assessment/Plan:     1. Hip strain, right, subsequent encounter  - tizanidine (ZANAFLEX) 4 MG Tab; Take 1 Tab by mouth at bedtime as needed.  Dispense: 20 Tab; Refill: 0  2. Strain of lumbar region, subsequent encounter  - REFERRAL TO RADIOLOGY  - MR-LUMBAR SPINE-W/O; Future  - tizanidine (ZANAFLEX) 4 MG Tab; Take 1 Tab by mouth at bedtime as needed.  Dispense: 20 Tab; Refill: 0  3. Thoracic myofascial strain, subsequent encounter  - tizandine (ZANAFLEX) 4 MG Tab; Take 1 Tab by mouth at bedtime as needed.  Dispense: 20 Tab; Refill: 0  4. Strain of neck muscle, subsequent encounter  - REFERRAL TO RADIOLOGY  - tizanidine (ZANAFLEX) 4 MG Tab; Take 1 Tab by mouth at bedtime as needed.  Dispense: 20 Tab; Refill: 0  - MR-CERVICAL SPINE-W/O; Future    Upper back pain seems to be more lower cervical pain versus upper thoracic.  Now with cervical radiculopathy  Continue physical therapy  Referral for MRI lumbar and thoracic spine  Prescribed tizanidine  Full duty  Follow-up 3 weeks

## 2019-08-28 ENCOUNTER — TELEPHONE (OUTPATIENT)
Dept: CARDIOLOGY | Facility: MEDICAL CENTER | Age: 69
End: 2019-08-28

## 2019-08-28 ENCOUNTER — HOSPITAL ENCOUNTER (OUTPATIENT)
Dept: RADIOLOGY | Facility: MEDICAL CENTER | Age: 69
End: 2019-08-28
Attending: INTERNAL MEDICINE
Payer: COMMERCIAL

## 2019-08-28 DIAGNOSIS — E78.5 DYSLIPIDEMIA: ICD-10-CM

## 2019-08-28 DIAGNOSIS — I25.10 ATHEROSCLEROSIS OF NATIVE CORONARY ARTERY OF NATIVE HEART WITHOUT ANGINA PECTORIS: ICD-10-CM

## 2019-08-28 PROCEDURE — 4410556 CT-CARDIAC SCORING

## 2019-08-28 NOTE — TELEPHONE ENCOUNTER
Pt called back and confirmed she is able to walk during a treadmill stress test. Test ordered. Pt requesting results of Cardiac Scoring, advised will be able to mail it to her. Verbalized understanding and appreciative of call.

## 2019-08-28 NOTE — TELEPHONE ENCOUNTER
FK recommending Nuc Med Treadmill Stress test per Cardiac Scoring results. Attempted to call pt, no answer, LM for her to call back. [email or web address] message sent to pt.

## 2019-08-30 ENCOUNTER — PHYSICAL THERAPY (OUTPATIENT)
Dept: PHYSICAL THERAPY | Facility: REHABILITATION | Age: 69
End: 2019-08-30
Attending: PREVENTIVE MEDICINE
Payer: COMMERCIAL

## 2019-08-30 DIAGNOSIS — S39.012D STRAIN OF LUMBAR REGION, SUBSEQUENT ENCOUNTER: ICD-10-CM

## 2019-08-30 DIAGNOSIS — S29.019D THORACIC MYOFASCIAL STRAIN, SUBSEQUENT ENCOUNTER: ICD-10-CM

## 2019-08-30 DIAGNOSIS — S76.011D HIP STRAIN, RIGHT, SUBSEQUENT ENCOUNTER: ICD-10-CM

## 2019-08-30 PROCEDURE — 97110 THERAPEUTIC EXERCISES: CPT

## 2019-08-30 NOTE — OP THERAPY DAILY TREATMENT
Outpatient Physical Therapy  DAILY TREATMENT     [facility] [place]  [address], [address]  [place] [place] [facility] [address]  Phone:  [phone]  Fax:  [phone]    Date: 08/30/2019    Patient: Kayla Jensen  YOB: 1950  MRN: [id number]     Time Calculation  Start time: 0730  Stop time: 0800 Time Calculation (min): 30 minutes       Chief Complaint: back, leg pain  Visit #: 2    SUBJECTIVE:  Calf pain still hurts at night. Stopped taking muscle relaxer's because she was losing sleep on them.      OBJECTIVE:  Current objective measures: Lumbar ROM WNL. -homans, no LE swelling observed          Therapeutic Exercises (CPT 68146):     1. Calf rocking, x3min    2. Supine ball rolls, x50    3. Supine march levl 1 x 2 , x20eac    4. Nustep lvl 4, 5min with stops due to R quad pain and     5. Sit to stands eccentric squat, x12    6. Heel raises, x12 without reports of increased pain      Time-based treatments/modalities:  Therapeutic exercise minutes (CPT 82271): 30 minutes       Pain rating before treatment: 7  Pain rating after treatment: 7    ASSESSMENT:   Response to treatment: Symptoms fit no specific pattern in terms of movement/iiratabiltiy. Pt is standing and walking for 8 hour work day. Symptoms were not provocated consistently with any exercises in clinic.      PLAN/RECOMMENDATIONS:   Plan for treatment: therapy treatment to continue next visit.  Planned interventions for next visit: continue with current treatment.

## 2019-08-31 ENCOUNTER — HOSPITAL ENCOUNTER (OUTPATIENT)
Dept: LAB | Facility: MEDICAL CENTER | Age: 69
End: 2019-08-31
Attending: NURSE PRACTITIONER
Payer: MEDICARE

## 2019-08-31 DIAGNOSIS — Z11.59 NEED FOR HEPATITIS C SCREENING TEST: ICD-10-CM

## 2019-08-31 LAB — HCV AB SER QL: NEGATIVE

## 2019-08-31 PROCEDURE — 36415 COLL VENOUS BLD VENIPUNCTURE: CPT

## 2019-08-31 PROCEDURE — 86803 HEPATITIS C AB TEST: CPT

## 2019-09-01 DIAGNOSIS — M54.42 CHRONIC LEFT-SIDED LOW BACK PAIN WITH LEFT-SIDED SCIATICA: ICD-10-CM

## 2019-09-01 DIAGNOSIS — G89.29 CHRONIC LEFT-SIDED LOW BACK PAIN WITH LEFT-SIDED SCIATICA: ICD-10-CM

## 2019-09-03 ENCOUNTER — PHYSICAL THERAPY (OUTPATIENT)
Dept: PHYSICAL THERAPY | Facility: REHABILITATION | Age: 69
End: 2019-09-03
Attending: PREVENTIVE MEDICINE
Payer: COMMERCIAL

## 2019-09-03 ENCOUNTER — OFFICE VISIT (OUTPATIENT)
Dept: URGENT CARE | Facility: PHYSICIAN GROUP | Age: 69
End: 2019-09-03
Payer: MEDICARE

## 2019-09-03 VITALS
HEIGHT: 63 IN | OXYGEN SATURATION: 97 % | RESPIRATION RATE: 18 BRPM | TEMPERATURE: 97.1 F | WEIGHT: 176 LBS | HEART RATE: 82 BPM | DIASTOLIC BLOOD PRESSURE: 84 MMHG | SYSTOLIC BLOOD PRESSURE: 134 MMHG | BODY MASS INDEX: 31.18 KG/M2

## 2019-09-03 DIAGNOSIS — S39.012D STRAIN OF LUMBAR REGION, SUBSEQUENT ENCOUNTER: ICD-10-CM

## 2019-09-03 DIAGNOSIS — S76.011D HIP STRAIN, RIGHT, SUBSEQUENT ENCOUNTER: ICD-10-CM

## 2019-09-03 DIAGNOSIS — S29.019D THORACIC MYOFASCIAL STRAIN, SUBSEQUENT ENCOUNTER: ICD-10-CM

## 2019-09-03 DIAGNOSIS — R05.9 COUGH: ICD-10-CM

## 2019-09-03 DIAGNOSIS — J01.00 ACUTE NON-RECURRENT MAXILLARY SINUSITIS: ICD-10-CM

## 2019-09-03 PROCEDURE — 99214 OFFICE O/P EST MOD 30 MIN: CPT | Performed by: PHYSICIAN ASSISTANT

## 2019-09-03 PROCEDURE — 97110 THERAPEUTIC EXERCISES: CPT

## 2019-09-03 RX ORDER — BENZONATATE 100 MG/1
100 CAPSULE ORAL 3 TIMES DAILY PRN
Qty: 60 CAP | Refills: 0 | Status: SHIPPED | OUTPATIENT
Start: 2019-09-03 | End: 2019-10-07

## 2019-09-03 RX ORDER — AMOXICILLIN AND CLAVULANATE POTASSIUM 875; 125 MG/1; MG/1
1 TABLET, FILM COATED ORAL 2 TIMES DAILY
Qty: 20 TAB | Refills: 0 | Status: SHIPPED | OUTPATIENT
Start: 2019-09-03 | End: 2019-09-13

## 2019-09-03 RX ORDER — GABAPENTIN 300 MG/1
900 CAPSULE ORAL 2 TIMES DAILY
Qty: 540 CAP | Refills: 0 | Status: SHIPPED | OUTPATIENT
Start: 2019-09-03 | End: 2019-12-05 | Stop reason: SDUPTHER

## 2019-09-03 ASSESSMENT — ENCOUNTER SYMPTOMS
COUGH: 1
HEADACHES: 1
SORE THROAT: 1
CHILLS: 0
SINUS PRESSURE: 1
SHORTNESS OF BREATH: 0
SWOLLEN GLANDS: 0
HOARSE VOICE: 1

## 2019-09-03 NOTE — PROGRESS NOTES
Subjective:   Kayla Jensen is a 68 y.o. female who presents for Sinus Pain (x3days )        Patient presents complaining of nasal congestion and sinus pain x4 days.  She states that symptoms are worsening and that symptoms such as these often lead to a sinus infection for her.  She is concerned because she is traveling to [place] for a month and leaving tomorrow.    Sinusitis   This is a new problem. The current episode started in the past 7 days. The problem has been rapidly worsening since onset. There has been no fever. The pain is moderate. Associated symptoms include congestion, coughing, headaches, a hoarse voice, sinus pressure and a sore throat. Pertinent negatives include no chills, ear pain, shortness of breath or swollen glands. Past treatments include oral decongestants and saline sprays. The treatment provided mild relief.     Review of Systems   Constitutional: Negative for chills.   HENT: Positive for congestion, hoarse voice, sinus pressure and sore throat. Negative for ear pain.    Respiratory: Positive for cough. Negative for shortness of breath.    Neurological: Positive for headaches.       PMH:  has a past medical history of Actinic keratoses (11/6/2018), Allergic rhinitis, Allergy, Anesthesia (07/2018), Arthritis (07/2018), Back pain, Breath shortness (07/2018), Bronchitis, Cataract, Chickenpox, Dental disorder, Emphysema of lung (HCC) (07/2018), GERD (gastroesophageal reflux disease), Greek measles, Heart burn, Hyperlipidemia, Influenza, Lentigo (11/6/2018), Nasal drainage, Neoplasm of uncertain behavior of skin of cheek (9/25/2018), Neoplasm of uncertain behavior of skin of chest (11/6/2018), Personal history of venous thrombosis and embolism (1982), Pneumonia, Renal disorder, Skin lesion of right lower extremity (9/25/2018), Sleep apnea (07/2018), and Unspecified hemorrhagic conditions.  MEDS:   Current Outpatient Medications:   •  amoxicillin-clavulanate (AUGMENTIN) 875-125 MG Tab,  Take 1 Tab by mouth 2 times a day for 10 days., Disp: 20 Tab, Rfl: 0  •  benzonatate (TESSALON) 100 MG Cap, Take 1 Cap by mouth 3 times a day as needed for Cough., Disp: 60 Cap, Rfl: 0  •  tizanidine (ZANAFLEX) 4 MG Tab, Take 1 Tab by mouth at bedtime as needed., Disp: 20 Tab, Rfl: 0  •  tiotropium (SPIRIVA) 18 MCG Cap, Inhale 1 Cap by mouth every day., Disp: 90 Cap, Rfl: 3  •  tramadol (ULTRAM) 50 MG Tab, Take 1-2 Tabs by mouth 1 time daily as needed for Severe Pain (6 hours apart from Tylenol #3) for up to 30 days., Disp: 60 Tab, Rfl: 0  •  albuterol (PROAIR HFA) 108 (90 Base) MCG/ACT Aero Soln inhalation aerosol, Inhale 2 Puffs by mouth every 6 hours as needed for Shortness of Breath., Disp: 8.5 g, Rfl: 3  •  Cholecalciferol (VITAMIN D3) 5000 units Cap, Take 1 Cap by mouth every day., Disp: , Rfl:   •  gabapentin (NEURONTIN) 300 MG Cap, Take 3 Caps by mouth 2 Times a Day., Disp: 540 Cap, Rfl: 3  •  Nasal Wash (ALKALOL) Solution, Spray  in nose., Disp: , Rfl:   •  methylPREDNISolone (MEDROL DOSEPAK) 4 MG Tablet Therapy Pack, Take as directed on package. 1 pack. (Patient not taking: Reported on 8/26/2019), Disp: 21 Tab, Rfl: 0  •  hydrOXYzine HCl (ATARAX) 25 MG Tab, Take 1 Tab by mouth 3 times a day as needed for Itching. (Patient not taking: Reported on 8/26/2019), Disp: 60 Tab, Rfl: 0  •  budesonide-formoterol (SYMBICORT) 160-4.5 MCG/ACT Aerosol, Inhale 2 Puffs by mouth 2 Times a Day. (Patient not taking: Reported on 8/7/2019), Disp: 2 Inhaler, Rfl: 11  •  fexofenadine-pseudoephedrine (ALLEGRA-D)  MG per tablet, TAKE 1 TABLET BY MOUTH 2 TIMES A DAY. (Patient not taking: Reported on 8/2/2019), Disp: 60 Tab, Rfl: 3  •  eucalyptus/peppermint oil (PONARIS NASAL) Solution, Spray 10 Drops in nose every 6 hours as needed., Disp: 1 Bottle, Rfl: 2  ALLERGIES:   Allergies   Allergen Reactions   • Aspirin      Stomach pain   • Ibuprofen      Stomach pain   • Tape Rash     SURGHX:   Past Surgical History:   Procedure  "Laterality Date   • KNEE ARTHROSCOPY Right 7/25/2018    Procedure: KNEE ARTHROSCOPY;  Surgeon: Goldy Tran M.D.;  Location: [facility];  Service: Orthopedics   • MEDIAL MENISCECTOMY  7/25/2018    Procedure: MEDIAL MENISCECTOMY- PARTIAL;  Surgeon: Goldy Tran M.D.;  Location: [facility];  Service: Orthopedics   • CHONDROPLASTY  7/25/2018    Procedure: CHONDROPLASTY ;  Surgeon: Goldy Tran M.D.;  Location: [facility];  Service: Orthopedics   • CATARACT EXTRACTION WITH IOL Bilateral 2013   • NASAL SEPTAL RECONSTRUCTION  2013    sinus surgery   • KNEE ARTHROPLASTY TOTAL  3/18/2009    Performed by SURI PARK at [facility]   • TUBE & ECTOPIC PREG., REMOVAL  1982   • CYST EXCISION Right 1970    right breast   • ARTHROSCOPY, KNEE     • HYSTERECTOMY, VAGINAL     • ID THROAT SURGERY PROCEDURE UNLISTED     • SINUSCOPE     • TONSILLECTOMY     • TONSILLECTOMY       SOCHX:  reports that she quit smoking about 17 years ago. Her smoking use included cigarettes. She has a 60.00 pack-year smoking history. She has never used smokeless tobacco. She reports that she drinks alcohol. She reports that she does not use drugs.  FH: Family history was reviewed, no pertinent findings to report   Objective:   /84   Pulse 82   Temp 36.2 °C (97.1 °F) (Temporal)   Resp 18   Ht 1.6 m (5' 3\")   Wt 79.8 kg (176 lb)   LMP 09/25/2007   SpO2 97%   BMI 31.18 kg/m²   Physical Exam   Constitutional: She is oriented to person, place, and time. Vital signs are normal. She appears well-developed and well-nourished.  Non-toxic appearance. No distress.   HENT:   Head: Normocephalic and atraumatic.   Right Ear: Hearing, tympanic membrane, external ear and ear canal normal.   Left Ear: Hearing, external ear and ear canal normal. Tympanic membrane is injected.   Nose: Mucosal edema and rhinorrhea present. Right sinus exhibits maxillary sinus tenderness and frontal sinus " tenderness. Left sinus exhibits maxillary sinus tenderness and frontal sinus tenderness.   Mouth/Throat: Uvula is midline, oropharynx is clear and moist and mucous membranes are normal. No posterior oropharyngeal erythema.   Significant congestion.    Eyes: Conjunctivae and lids are normal.   Neck: Neck supple.   Cardiovascular: Normal rate, regular rhythm, S1 normal, S2 normal and normal heart sounds. Exam reveals no gallop and no friction rub.   No murmur heard.  Pulmonary/Chest: Effort normal and breath sounds normal. No respiratory distress. She has no decreased breath sounds. She has no wheezes. She has no rhonchi. She has no rales.   Abdominal: Normal appearance.   Musculoskeletal:   Normal range of motion. Exhibits no edema and no tenderness.    Neurological: She is alert and oriented to person, place, and time. No cranial nerve deficit or sensory deficit.   Skin: Skin is warm, dry and intact. Capillary refill takes less than 2 seconds.   Psychiatric: She has a normal mood and affect. Her speech is normal and behavior is normal. Judgment and thought content normal. Cognition and memory are normal.   Vitals reviewed.        Assessment/Plan:   1. Acute non-recurrent maxillary sinusitis  - amoxicillin-clavulanate (AUGMENTIN) 875-125 MG Tab; Take 1 Tab by mouth 2 times a day for 10 days.  Dispense: 20 Tab; Refill: 0    2. Cough  - benzonatate (TESSALON) 100 MG Cap; Take 1 Cap by mouth 3 times a day as needed for Cough.  Dispense: 60 Cap; Refill: 0    Patient advised that symptoms are most likely viral in nature at this time.  However if she is leaving town for a prolonged period of time I would like her to have an antibiotic that she may start if symptoms persist for another 5 days.  Additionally she may begin this medication if symptoms suddenly worsen.    Pt instructed to complete full course of medication despite symptomatic improvement. Pt to take med with meals to alleviate GI upset. Pt to take a probiotic  or eat Erin’s yogurt/ kefir while taking the medication.    Flonase 1 squirt in each nostril, as instructed in clinic, once a day.  Use nasal saline TID to promote drainage.   Salt water gurgles to soothe sore throat.  Ayr saline gel to moisturize nasal passage and prevent bleeding.  Continue Mucinex.  May discontinue if start antibiotics.  If symptoms significantly change in nature patient should seek medical reevaluation.    Differential diagnosis, natural history, supportive care, and indications for immediate follow-up discussed.

## 2019-09-03 NOTE — OP THERAPY DAILY TREATMENT
"  Outpatient Physical Therapy  DAILY TREATMENT     [facility]  [address], [address]  [facility] [address]  Phone:  [phone]  Fax:  [phone]    Date: 09/03/2019    Patient: Kayla Jensen  YOB: 1950  MRN: [id number]     Time Calculation  Start time: 0730  Stop time: 0755 Time Calculation (min): 25 minutes       Chief Complaint: back, leg pain  Visit #: 3    SUBJECTIVE:  Calf pain reported reduced at night. No reported increased pain with movement in clinic today. \"I took a tramadol and it didn't touch my pain today\"        OBJECTIVE:  Current objective measures: Lumbar ROM WNL. -homans, no LE swelling observed          Therapeutic Exercises (CPT 61221):     1. Supine lumbar twist/rocking, x20    2. Supine ball rolls, x50    3. Supine march levl 1 x 2 , x20eac    4. Shuttle , 4c/5c x 60,/x 20     5. Ball bridges, x15, 3 sec holds    6. Single leg standing weight shifts, 2min      Time-based treatments/modalities:  Therapeutic exercise minutes (CPT 34874): 25 minutes         ASSESSMENT:   Response to treatment: Symptoms fit no specific pattern in terms of movement/iiratabiltiy. Pt is standing and walking for 8 hour work day. Symptoms were not provocated consistently with any exercises in clinic.      PLAN/RECOMMENDATIONS:   Plan for treatment: therapy treatment to continue next visit.  Planned interventions for next visit: continue with current treatment.       "

## 2019-09-04 ENCOUNTER — TELEPHONE (OUTPATIENT)
Dept: CARDIOLOGY | Facility: MEDICAL CENTER | Age: 69
End: 2019-09-04

## 2019-09-04 NOTE — TELEPHONE ENCOUNTER
Pt returning your call she can be reached at [phone]. If she does not answer first time please try again as [place] is in a airport.

## 2019-09-04 NOTE — TELEPHONE ENCOUNTER
Pt called. Pt wont be home until 9/11, she is on a trip. Pt is already scheduled for a treadmill stress test on 10/1. Pt states if she needs something else to just let her know. Pt reassured I would confirm with MD and let her know. FK states that is fine. My Chart message sent to confirm.

## 2019-09-04 NOTE — TELEPHONE ENCOUNTER
Requested Prescriptions     Pending Prescriptions Disp Refills   • gabapentin (NEURONTIN) 300 MG Cap [Pharmacy Med Name: GABAPENTIN 300 MG CAPSULE] 540 Cap 0     Sig: TAKE 3 CAPS BY MOUTH 2 TIMES A DAY.       XAVI Castano.

## 2019-09-04 NOTE — TELEPHONE ENCOUNTER
Message   Received: Today   Message Contents   Roque Neil M.D.  Rossy Jaramillo R.N.             Needs stress echo if she can walk on treadmill, otherwise, nuc stress,chemical    Previous Messages            Result Notes for CT-CARDIAC SCORING     Notes recorded by Rossy Jaramillo R.N. on 8/28/2019 at 11:28 AM PDT  Pt seen 8/7  Pt scheduled for f/u 11/12     Pt called. No answer, voicemail left with request for call back and contact information.    My Chart message also sent.

## 2019-09-13 ENCOUNTER — TELEPHONE (OUTPATIENT)
Dept: OCCUPATIONAL MEDICINE | Facility: CLINIC | Age: 69
End: 2019-09-13

## 2019-09-16 ENCOUNTER — OCCUPATIONAL MEDICINE (OUTPATIENT)
Dept: OCCUPATIONAL MEDICINE | Facility: CLINIC | Age: 69
End: 2019-09-16
Payer: COMMERCIAL

## 2019-09-16 VITALS
DIASTOLIC BLOOD PRESSURE: 84 MMHG | WEIGHT: 175 LBS | HEIGHT: 63 IN | HEART RATE: 90 BPM | BODY MASS INDEX: 31.01 KG/M2 | OXYGEN SATURATION: 91 % | SYSTOLIC BLOOD PRESSURE: 134 MMHG | RESPIRATION RATE: 16 BRPM | TEMPERATURE: 97.9 F

## 2019-09-16 DIAGNOSIS — S76.011D HIP STRAIN, RIGHT, SUBSEQUENT ENCOUNTER: ICD-10-CM

## 2019-09-16 DIAGNOSIS — S39.012D STRAIN OF LUMBAR REGION, SUBSEQUENT ENCOUNTER: ICD-10-CM

## 2019-09-16 DIAGNOSIS — S29.019D THORACIC MYOFASCIAL STRAIN, SUBSEQUENT ENCOUNTER: ICD-10-CM

## 2019-09-16 DIAGNOSIS — S16.1XXD STRAIN OF NECK MUSCLE, SUBSEQUENT ENCOUNTER: ICD-10-CM

## 2019-09-16 PROCEDURE — 99214 OFFICE O/P EST MOD 30 MIN: CPT | Performed by: PREVENTIVE MEDICINE

## 2019-09-16 RX ORDER — CARISOPRODOL 250 MG/1
1 TABLET ORAL
Qty: 15 TAB | Refills: 0 | Status: SHIPPED | OUTPATIENT
Start: 2019-09-16 | End: 2019-10-01

## 2019-09-16 ASSESSMENT — ENCOUNTER SYMPTOMS
TINGLING: 1
SENSORY CHANGE: 1

## 2019-09-16 NOTE — PROGRESS NOTES
"Subjective:      Kayla Jensen is a 68 y.o. female who presents with Follow-Up ([facility] DOI 6/23/19 back, smae, rm 18)      DOI 6/23/19: 67 yo female presents with right hip and low back pain. She slipped on liquid that was on the tile and fell on her bottom. She noted pain in the right hip and mid back which worsened throughout the day. She was seen in UC and x-rays were obtained and negative for acute fracture dislocation. Patient states overall symptoms are the same.  Continues a significant neck to lower back pain.  She gets pain down the right calf.  She also gets numbness and tingling down the arms.  Patient states that MRI scheduled for tomorrow.  She states the tizanidine she is prescribed kept her awake and was not effective at all.  She states she would be interested in trying Soma.     HPI    Review of Systems   Constitutional: Negative for malaise/fatigue.   Skin: Negative for itching and rash.   Neurological: Positive for tingling and sensory change.     SOCHX: Works as a  at the [employer]  FH: No pertinent family history to this problem.     Objective:     /84   Pulse 90   Temp 36.6 °C (97.9 °F)   Resp 16   Ht 1.6 m (5' 3\")   Wt 79.4 kg (175 lb)   LMP 09/25/2007   SpO2 91%   BMI 31.00 kg/m²      Physical Exam    Lumbothoracic: No gross deformity.  Diffuse tenderness bilateral paraspinal musculature lower thoracic/lumbar..  Essentially full range of motion with mild discomfort in flexion and rotation bilaterally.  Slight antalgic gait.  Cervical: No gross deformity.  Right lower cervical paraspinal musculature tenderness and upper trapezius tenderness.  Slight decrease in rotation right.    Right hip: No gross deformity. tenderness diffusely over the right hip.       Assessment/Plan:     1. Hip strain, right, subsequent encounter  - Carisoprodol 250 MG Tab; Take 1 Tab by mouth at bedtime as needed for up to 15 days.  Dispense: 15 Tab; Refill: 0  - REFERRAL TO PHYSIATRY " (PMR)    2. Strain of lumbar region, subsequent encounter  - Carisoprodol 250 MG Tab; Take 1 Tab by mouth at bedtime as needed for up to 15 days.  Dispense: 15 Tab; Refill: 0  - REFERRAL TO PHYSIATRY (PMR)    3. Thoracic myofascial strain, subsequent encounter  - Carisoprodol 250 MG Tab; Take 1 Tab by mouth at bedtime as needed for up to 15 days.  Dispense: 15 Tab; Refill: 0  - REFERRAL TO PHYSIATRY (PMR)    4. Strain of neck muscle, subsequent encounte  - Carisoprodol 250 MG Tab; Take 1 Tab by mouth at bedtime as needed for up to 15 days.  Dispense: 15 Tab; Refill: 0  - REFERRAL TO PHYSIATRY (PMR)

## 2019-09-16 NOTE — LETTER
[address],   [address] [place] [address]  Phone:  [phone] - Fax:  [phone]   [facility] Progress Report and Disability Certification  Date of Service: 9/16/2019   No Show:  No  Date / Time of Next Visit: 10/21/19 @ 7:30 AM   Claim Information   Patient Name: Kayla Jensen  Claim Number:     Employer: [employer]  Date of Injury: 6/23/2019     Insurer / TPA: [employer]  ID / SSN:     Occupation:   Diagnosis: Diagnoses of Hip strain, right, subsequent encounter, Strain of lumbar region, subsequent encounter, Thoracic myofascial strain, subsequent encounter, and Strain of neck muscle, subsequent encounter were pertinent to this visit.    Medical Information   Related to Industrial Injury? Yes    Subjective Complaints:  DOI 6/23/19: 67 yo female presents with right hip and low back pain. She slipped on liquid that was on the tile and fell on her bottom. She noted pain in the right hip and mid back which worsened throughout the day. She was seen in  and x-rays were obtained and negative for acute fracture dislocation. Patient states overall symptoms are the same.  Continues a significant neck to lower back pain.  She gets pain down the right calf.  She also gets numbness and tingling down the arms.  Patient states that MRI scheduled for tomorrow.  She states the tizanidine she is prescribed kept her awake and was not effective at all.  She states she would be interested in trying Soma.   Objective Findings: Lumbothoracic: No gross deformity.  Diffuse tenderness bilateral paraspinal musculature lower thoracic/lumbar..  Essentially full range of motion with mild discomfort in flexion and rotation bilaterally.  Slight antalgic gait.  Cervical: No gross deformity.  Right lower cervical paraspinal musculature tenderness and upper trapezius tenderness.  Slight decrease in rotation right.    Right hip: No gross deformity.  tenderness diffusely over the right hip.   Pre-Existing Condition(s):     Assessment:   Condition Same    Status: Additional Care Required  Permanent Disability:No    Plan:      Diagnostics:      Comments:  Referral to physiatry, prefers Dr. Araiza  MRI lumbar pending  Prescribe Soma to use at night as needed  Full duty  Follow-up 3 weeks if not seen by physiatry    Disability Information   Status: Released to Full Duty    From:  9/16/2019  Through: 10/14/2019 Restrictions are:     Physical Restrictions   Sitting:    Standing:    Stooping:    Bending:      Squatting:    Walking:    Climbing:    Pushing:      Pulling:    Other:    Reaching Above Shoulder (L):   Reaching Above Shoulder (R):       Reaching Below Shoulder (L):    Reaching Below Shoulder (R):      Not to exceed Weight Limits   Carrying(hrs):   Weight Limit(lb):   Lifting(hrs):   Weight  Limit(lb):     Comments:      Repetitive Actions   Hands: i.e. Fine Manipulations from Grasping:     Feet: i.e. Operating Foot Controls:     Driving / Operate Machinery:     Physician Name: Bill Shah D.O. Physician Signature: BILL Mcleod D.O. e-Signature: Dr. Ernesto Garcia, Medical Director   Clinic Name / Location: [address],   [address] [place] [address] Clinic Phone Number: Dept: [phone]   Appointment Time: 7:45 Am Visit Start Time: 7:34 AM   Check-In Time:  7:27 Am Visit Discharge Time: 7:55 AM   Original-Treating Physician or Chiropractor    Page 2-Insurer/TPA    Page 3-Employer    Page 4-Employee

## 2019-09-17 ENCOUNTER — APPOINTMENT (OUTPATIENT)
Dept: PHYSICAL THERAPY | Facility: REHABILITATION | Age: 69
End: 2019-09-17
Attending: PREVENTIVE MEDICINE
Payer: COMMERCIAL

## 2019-09-17 ENCOUNTER — HOSPITAL ENCOUNTER (OUTPATIENT)
Dept: RADIOLOGY | Facility: MEDICAL CENTER | Age: 69
End: 2019-09-17
Attending: PREVENTIVE MEDICINE
Payer: COMMERCIAL

## 2019-09-17 DIAGNOSIS — S39.012D STRAIN OF LUMBAR REGION, SUBSEQUENT ENCOUNTER: ICD-10-CM

## 2019-09-17 PROCEDURE — 72148 MRI LUMBAR SPINE W/O DYE: CPT

## 2019-09-20 ENCOUNTER — PHYSICAL THERAPY (OUTPATIENT)
Dept: PHYSICAL THERAPY | Facility: REHABILITATION | Age: 69
End: 2019-09-20
Attending: PREVENTIVE MEDICINE
Payer: COMMERCIAL

## 2019-09-20 DIAGNOSIS — S29.019D THORACIC MYOFASCIAL STRAIN, SUBSEQUENT ENCOUNTER: ICD-10-CM

## 2019-09-20 DIAGNOSIS — S39.012D STRAIN OF LUMBAR REGION, SUBSEQUENT ENCOUNTER: ICD-10-CM

## 2019-09-20 DIAGNOSIS — S76.011D HIP STRAIN, RIGHT, SUBSEQUENT ENCOUNTER: ICD-10-CM

## 2019-09-20 PROCEDURE — 97110 THERAPEUTIC EXERCISES: CPT

## 2019-09-20 NOTE — OP THERAPY DAILY TREATMENT
Outpatient Physical Therapy  DAILY TREATMENT     [facility] [place]  [address], [address]  [facility] [address]  Phone:  [phone]  Fax:  [phone]    Date: 09/20/2019    Patient: Kayla Jensen  YOB: 1950  MRN: [id number]     Time Calculation  Start time: 0730  Stop time: 0753 Time Calculation (min): 23 minutes       Chief Complaint: back, leg pain  Visit #: 4    SUBJECTIVE:  No improvement reported. Pt reports that she gets pain down the R leg that is just driving her crazy. She has been referred to physiatry. She reports that she get numbness down her R arm as well.       OBJECTIVE:  Current objective measures: Lumbar ROM WNL. -homans, no LE swelling observed. thoracic ROM WNL. No response to movement with regards to pain.           Therapeutic Exercises (CPT 65940):     1. Supine lumbar twist/rocking, x20    2. Supine ball rolls, x50    3. Supine march levl 1 x 2 , x20eac    4. Shuttle , 4c/5c x 60,/x 20     5. Ball bridges, x15, 3 sec holds    6. Paloff press, 5 sec holds x 10 green      Time-based treatments/modalities:  Therapeutic exercise minutes (CPT 82022): 23 minutes       ASSESSMENT:   Response to treatment: again symptoms fit no specific pattern in terms of movement/iiratabiltiy. Pt is standing and walking for 8 hour work days regularly. Therapy ceased as no Improment subjectively. Subjective > objective.        PLAN/RECOMMENDATIONS:   Plan for treatment: refer patient back to MD.

## 2019-09-23 ENCOUNTER — APPOINTMENT (OUTPATIENT)
Dept: OCCUPATIONAL MEDICINE | Facility: CLINIC | Age: 69
End: 2019-09-23
Payer: MEDICARE

## 2019-09-24 ENCOUNTER — APPOINTMENT (OUTPATIENT)
Dept: PHYSICAL THERAPY | Facility: REHABILITATION | Age: 69
End: 2019-09-24
Attending: PREVENTIVE MEDICINE
Payer: COMMERCIAL

## 2019-09-24 ENCOUNTER — OCCUPATIONAL MEDICINE (OUTPATIENT)
Dept: OCCUPATIONAL MEDICINE | Facility: CLINIC | Age: 69
End: 2019-09-24
Payer: COMMERCIAL

## 2019-09-24 VITALS
BODY MASS INDEX: 31.01 KG/M2 | DIASTOLIC BLOOD PRESSURE: 82 MMHG | TEMPERATURE: 98 F | OXYGEN SATURATION: 97 % | SYSTOLIC BLOOD PRESSURE: 138 MMHG | HEIGHT: 63 IN | WEIGHT: 175 LBS | RESPIRATION RATE: 16 BRPM | HEART RATE: 68 BPM

## 2019-09-24 DIAGNOSIS — S29.019D THORACIC MYOFASCIAL STRAIN, SUBSEQUENT ENCOUNTER: ICD-10-CM

## 2019-09-24 DIAGNOSIS — M51.35 DDD (DEGENERATIVE DISC DISEASE), THORACOLUMBAR: ICD-10-CM

## 2019-09-24 DIAGNOSIS — S39.012D STRAIN OF LUMBAR REGION, SUBSEQUENT ENCOUNTER: ICD-10-CM

## 2019-09-24 PROCEDURE — 99212 OFFICE O/P EST SF 10 MIN: CPT | Performed by: PREVENTIVE MEDICINE

## 2019-09-24 NOTE — LETTER
[address],   [address] [place] [address]  Phone:  [phone] - Fax:  [phone]   [facility] Progress Report and Disability Certification  Date of Service: 9/24/2019   No Show:  No  Date / Time of Next Visit:  Release from care   Claim Information   Patient Name: Kayla Jensen  Claim Number:     Employer: [employer]  Date of Injury: 6/23/2019     Insurer / TPA: [employer]  ID / SSN:     Occupation:   Diagnosis: Diagnoses of Strain of lumbar region, subsequent encounter, Thoracic myofascial strain, subsequent encounter, and DDD (degenerative disc disease), thoracolumbar were pertinent to this visit.    Medical Information   Related to Industrial Injury? Yes    Subjective Complaints:  DOI 6/23/19: 67 yo female presents with right hip and low back pain. She slipped on liquid that was on the tile and fell on her bottom. She noted pain in the right hip and mid back which worsened throughout the day. Patient states symtpoms are unchanged. She is in the process of making an appointment with physiatry.  She is here for her MRI results.   Objective Findings: Lumbothoracic: No gross deformity. Essentially full range of motion with mild discomfort in flexion and rotation bilaterally.  Slight antalgic gait.  Cervical: No gross deformity.  Slight decrease in rotation right otherwise range of motion intact.         MRI Lumbar: Multilevel degenerative disc disease and facet degeneration. There is mild central canal narrowing at L4-5. There are varying degrees of neural foraminal narrowing at levels as specifically described above.   Pre-Existing Condition(s):     Assessment:   Condition Same    Status: Additional Care Required  Permanent Disability:No    Plan:      Diagnostics:      Comments:  Make appointment with physiatry  Transfer care to physiatry  Full duty  Follow-up as needed    Disability Information   Status: Released to  Full Duty    From:  9/24/2019  Through:   Restrictions are:     Physical Restrictions   Sitting:    Standing:    Stooping:    Bending:      Squatting:    Walking:    Climbing:    Pushing:      Pulling:    Other:    Reaching Above Shoulder (L):   Reaching Above Shoulder (R):       Reaching Below Shoulder (L):    Reaching Below Shoulder (R):      Not to exceed Weight Limits   Carrying(hrs):   Weight Limit(lb):   Lifting(hrs):   Weight  Limit(lb):     Comments:      Repetitive Actions   Hands: i.e. Fine Manipulations from Grasping:     Feet: i.e. Operating Foot Controls:     Driving / Operate Machinery:     Physician Name: Bill Shah D.O. Physician Signature: BILL Mcleod D.O. e-Signature: Dr. Ernesto Garcia, Medical Director   Clinic Name / Location: [address],   [address] [address] Clinic Phone Number: Dept: [phone]   Appointment Time: 7:45 Am Visit Start Time: 7:34 AM   Check-In Time:  7:21 Am Visit Discharge Time:  7:58am   Original-Treating Physician or Chiropractor    Page 2-Insurer/TPA    Page 3-Employer    Page 4-Employee

## 2019-09-24 NOTE — PROGRESS NOTES
"Subjective:      Kayla Jensen is a 68 y.o. female who presents with Follow-Up ([facility] DOI 6/23/19 back, worse, rm 17)      DOI 6/23/19: 67 yo female presents with right hip and low back pain. She slipped on liquid that was on the tile and fell on her bottom. She noted pain in the right hip and mid back which worsened throughout the day. Patient states symtpoms are unchanged. She is in the process of making an appointment with physiatry.  She is here for her MRI results.     HPI    ROS       Objective:     /82   Pulse 68   Temp 36.7 °C (98 °F)   Resp 16   Ht 1.6 m (5' 3\")   Wt 79.4 kg (175 lb)   LMP 09/25/2007   SpO2 97%   BMI 31.00 kg/m²      Physical Exam    Lumbothoracic: No gross deformity. Essentially full range of motion with mild discomfort in flexion and rotation bilaterally.  Slight antalgic gait.  Cervical: No gross deformity.  Slight decrease in rotation right otherwise range of motion intact.         MRI Lumbar: Multilevel degenerative disc disease and facet degeneration. There is mild central canal narrowing at L4-5. There are varying degrees of neural foraminal narrowing at levels as specifically described above.       Assessment/Plan:     1. Strain of lumbar region, subsequent encounter  2. Thoracic myofascial strain, subsequent encounter  3. DDD (degenerative disc disease), thoracolumbar    Make appointment with physiatry  Transfer care to physiatry  Full duty  Follow-up as needed  "

## 2019-09-27 ENCOUNTER — APPOINTMENT (OUTPATIENT)
Dept: PHYSICAL THERAPY | Facility: REHABILITATION | Age: 69
End: 2019-09-27
Attending: PREVENTIVE MEDICINE
Payer: COMMERCIAL

## 2019-10-01 ENCOUNTER — NON-PROVIDER VISIT (OUTPATIENT)
Dept: CARDIOLOGY | Facility: MEDICAL CENTER | Age: 69
End: 2019-10-01
Payer: MEDICARE

## 2019-10-01 VITALS
WEIGHT: 175 LBS | BODY MASS INDEX: 31.01 KG/M2 | OXYGEN SATURATION: 97 % | SYSTOLIC BLOOD PRESSURE: 112 MMHG | DIASTOLIC BLOOD PRESSURE: 74 MMHG | HEIGHT: 63 IN | HEART RATE: 61 BPM

## 2019-10-01 DIAGNOSIS — I25.10 ATHEROSCLEROSIS OF NATIVE CORONARY ARTERY OF NATIVE HEART WITHOUT ANGINA PECTORIS: ICD-10-CM

## 2019-10-01 LAB — TREADMILL STRESS: NORMAL

## 2019-10-01 PROCEDURE — 93015 CV STRESS TEST SUPVJ I&R: CPT | Performed by: INTERNAL MEDICINE

## 2019-10-07 ENCOUNTER — OFFICE VISIT (OUTPATIENT)
Dept: MEDICAL GROUP | Facility: PHYSICIAN GROUP | Age: 69
End: 2019-10-07
Payer: MEDICARE

## 2019-10-07 VITALS
WEIGHT: 178 LBS | SYSTOLIC BLOOD PRESSURE: 130 MMHG | OXYGEN SATURATION: 96 % | DIASTOLIC BLOOD PRESSURE: 74 MMHG | TEMPERATURE: 97.5 F | BODY MASS INDEX: 31.54 KG/M2 | HEIGHT: 63 IN | HEART RATE: 66 BPM

## 2019-10-07 DIAGNOSIS — E66.9 CLASS 1 OBESITY WITHOUT SERIOUS COMORBIDITY WITH BODY MASS INDEX (BMI) OF 31.0 TO 31.9 IN ADULT, UNSPECIFIED OBESITY TYPE: ICD-10-CM

## 2019-10-07 DIAGNOSIS — J44.9 MILD CHRONIC OBSTRUCTIVE PULMONARY DISEASE (HCC): ICD-10-CM

## 2019-10-07 DIAGNOSIS — M54.42 CHRONIC RIGHT-SIDED LOW BACK PAIN WITH LEFT-SIDED SCIATICA: ICD-10-CM

## 2019-10-07 DIAGNOSIS — G89.29 CHRONIC LEFT-SIDED LOW BACK PAIN WITH LEFT-SIDED SCIATICA: ICD-10-CM

## 2019-10-07 DIAGNOSIS — J32.4 CHRONIC PANSINUSITIS: ICD-10-CM

## 2019-10-07 DIAGNOSIS — Z79.891 CHRONIC USE OF OPIATE DRUG FOR THERAPEUTIC PURPOSE: ICD-10-CM

## 2019-10-07 DIAGNOSIS — R09.89 CHRONIC SINUS COMPLAINTS: ICD-10-CM

## 2019-10-07 DIAGNOSIS — M54.42 CHRONIC LEFT-SIDED LOW BACK PAIN WITH LEFT-SIDED SCIATICA: ICD-10-CM

## 2019-10-07 DIAGNOSIS — E78.5 DYSLIPIDEMIA: ICD-10-CM

## 2019-10-07 DIAGNOSIS — G89.29 CHRONIC RIGHT-SIDED LOW BACK PAIN WITH LEFT-SIDED SCIATICA: ICD-10-CM

## 2019-10-07 PROCEDURE — 99214 OFFICE O/P EST MOD 30 MIN: CPT | Performed by: NURSE PRACTITIONER

## 2019-10-07 RX ORDER — ACETAMINOPHEN AND CODEINE PHOSPHATE 300; 30 MG/1; MG/1
1-2 TABLET ORAL
Qty: 60 TAB | Refills: 0 | Status: SHIPPED | OUTPATIENT
Start: 2019-12-26 | End: 2020-03-30 | Stop reason: SDUPTHER

## 2019-10-07 RX ORDER — TIOTROPIUM BROMIDE 18 UG/1
18 CAPSULE ORAL; RESPIRATORY (INHALATION) DAILY
Qty: 90 CAP | Refills: 3 | Status: SHIPPED | OUTPATIENT
Start: 2019-10-07 | End: 2020-01-23 | Stop reason: CLARIF

## 2019-10-07 RX ORDER — TRAMADOL HYDROCHLORIDE 50 MG/1
50-100 TABLET ORAL
Qty: 30 TAB | Refills: 0 | Status: SHIPPED | OUTPATIENT
Start: 2019-11-26 | End: 2019-10-07 | Stop reason: SDUPTHER

## 2019-10-07 RX ORDER — ACETAMINOPHEN AND CODEINE PHOSPHATE 300; 30 MG/1; MG/1
1-2 TABLET ORAL
Qty: 60 TAB | Refills: 0 | Status: SHIPPED | OUTPATIENT
Start: 2019-11-26 | End: 2019-10-07 | Stop reason: SDUPTHER

## 2019-10-07 RX ORDER — CLINDAMYCIN HYDROCHLORIDE 300 MG/1
300 CAPSULE ORAL EVERY 6 HOURS
Qty: 20 CAP | Refills: 0 | Status: SHIPPED | OUTPATIENT
Start: 2019-10-07 | End: 2019-10-12

## 2019-10-07 RX ORDER — TRAMADOL HYDROCHLORIDE 50 MG/1
50-100 TABLET ORAL
Qty: 30 TAB | Refills: 0 | Status: SHIPPED | OUTPATIENT
Start: 2019-10-27 | End: 2019-10-07 | Stop reason: SDUPTHER

## 2019-10-07 RX ORDER — ACETAMINOPHEN AND CODEINE PHOSPHATE 300; 30 MG/1; MG/1
1-2 TABLET ORAL
Qty: 60 TAB | Refills: 0 | Status: SHIPPED | OUTPATIENT
Start: 2019-10-27 | End: 2019-10-07 | Stop reason: SDUPTHER

## 2019-10-07 RX ORDER — TRAMADOL HYDROCHLORIDE 50 MG/1
50-100 TABLET ORAL
Qty: 30 TAB | Refills: 0 | Status: SHIPPED | OUTPATIENT
Start: 2019-12-26 | End: 2020-03-30 | Stop reason: SDUPTHER

## 2019-10-07 RX ORDER — ATORVASTATIN CALCIUM 40 MG/1
40 TABLET, FILM COATED ORAL
Qty: 90 TAB | Refills: 1 | Status: SHIPPED | OUTPATIENT
Start: 2019-10-07 | End: 2020-01-08

## 2019-10-07 NOTE — ASSESSMENT & PLAN NOTE
"This is a new problem to the examiner.  This is a chronic problem for the patient that is ongoing, but stable.  Patient reports that she maintains a healthy diet and tries to stay physically active.  Vitals 9/16/2019 9/24/2019 10/1/2019 10/7/2019   WEIGHT 175 175 175 178   HEIGHT 5' 3\" 5' 3\" 5' 3\" 5' 3\"   BMI 31 kg/m2 31 kg/m2 31 kg/m2 31.53 kg/m2     "

## 2019-10-07 NOTE — ASSESSMENT & PLAN NOTE
This is a new problem to the examiner.  This is a chronic problem for the patient that was caused by a fall years ago that ended up being a Workmen's Comp. case.  Now, the patient continues to experience right-sided low back pain, right hip pain, right arm pain, right hand numbness.  The pain continues to worsen over time.  Patient continues to take gabapentin 900 mg twice daily, tramadol  mg once every morning, and Tylenol 3 in the evening only because this causes drowsiness.  The patient was prescribed lidocaine patches, but they were not covered by her insurance.  The patient is also followed by Dr. Tijerina for procedural management of her pain.  Patient had an epidural in May 2018.

## 2019-10-07 NOTE — ASSESSMENT & PLAN NOTE
"This is a new problem to the examiner.  Chronic, uncontrolled. Not taking any cholesterol lowering medications.  The patient had a cardiac CT calcium scoring test completed on 8/28/2019.  The patient's calcium score was greater than 400 at 564.4, which is above the 90th percentile for the patient's age and sex.  Reports diet is \"okay\".  She is not exercising regularly.  She is notis not taking ASA daily.  She denies dizziness, claudication, or chest pain.  Due for updated lab work.   3/18/2016 10:00 7/12/2017 07:58 10/27/2018 08:24   Cholesterol,Tot 176 191 193   Triglycerides 106 109 167 (H)   HDL 49 57 55    (H) 112 (H) 105 (H)     8/28/2019 10:23 AM    HISTORY/REASON FOR EXAM:  Risk stratification  Hyperlipidemia    TECHNIQUE/EXAM DESCRIPTION:  Multi-detector, helical imaging of the heart was performed with ECG gating and suspended respiration. Postprocessing was performed on a three-dimensional computer workstation. Diastolic phase images were evaluated for coronary artery calcification, and a quantitative assessment provided.    Low dose optimization technique was utilized for this CT exam including automated exposure control and adjustment of the mA and/or kV according to patient size.    COMPARISON: None.    FINDINGS:  Coronary calcification:  LMA - 0.0  LCX - 102.9  LAD - 195  RCA - 266.5  PDA - 0.0    Total Calcium Score: 564.4    Percentile: Calcium score is above the 90th percentile for the patient's age and sex.    Atelectatic plaque is seen in the aorta. Ascending aorta measures 4.2 cm in diameter. There is a trace pericardial effusion. Degenerative changes are seen in the spine. There is minimal right basilar atelectasis. There is a 4.6 mm nodule along the minor fissure which is unchanged.   Impression     Calcium Score of 400 or greater:  You have very significant cholesterol (plaque) build-up in the arteries that supply blood flow to your heart. This does not mean you have any blockages in " your arteries that require an intervention at this time, but you are at high risk of developing   heart disease or a stroke. Therefore, you need to be aggressive about preventing further plaque build-up. We recommend treating this plaque with a healthy low saturated fat, low sugar, mostly plant based diet and regular exercise. We highly recommend the use of aspirin (low dose, 81 mg once a day) and a cholesterol medication to help prevent further plaque build-up; please discuss these recommendations with your doctor. If you smoke, this likely has contributed to your cholesterol build-up, and you should quit as soon as possible. Please let your doctor know if you need medications or other resources to help you quit. If you are overweight, we also recommend gradual weight loss until you reach a normal weight. Each of these recommendations will lower your future risk of heart disease and stroke, and can even help decrease the amount of plaque you currently have. We recommend working with your personal physician to lower your risk of heart disease. We do not routinely recommend any further testing based on this test result, however a stress test or coronary CT angiogram could be considered.    Guidelines based upon the American College of Cardiology 2018 recommendations.    Ectatic ascending aorta measuring 4.2 cm.
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
## 2019-10-07 NOTE — ASSESSMENT & PLAN NOTE
Chronic, stable.  Currently using inhalers as prescribed, Spiriva 1 capsule daily, and albuterol rescue inhaler as needed.  She denies side effects.  Denies recent or current exacerbation.  Denies current shortness of breath, chest pain, or cough.  She is not on oxygen therapy.  Last PFT: 3/25/2019

## 2019-10-07 NOTE — ASSESSMENT & PLAN NOTE
"This is a new problem to the examiner.  This is a chronic problem for the patient that is ongoing.  Patient reports that she has been seen by ENT, specifically the ENT that performed her surgery 4 years ago, and states that her symptoms were dismissed and she was advised to use rubbing alcohol in both nares and Neosporin in both nares.  Patient reports that she has had a chronic sinus infection that does not go away.  She has been seen multiple times for this issue and prescribed antibiotics which \"dull it, but it comes right back\".  Patient reports cough with productive green thick sputum, sinus congestion, sinus pain.  "

## 2019-10-07 NOTE — ASSESSMENT & PLAN NOTE
"UDS: 10/7/2019    Controlled Substance Treatment Agreement: 10/7/2019    Obtained and reviewed the patient utilization report state pharmacy database on 10/7/2019.  Based on assessment of report prescription for tramadol and tylenol #3  is medically necessary. Patient has not requested any early refills and exhibits no abberant behavior. I have advised patient to keep medication and safe place and to not drive, use alcohol, or take illicit drugs while taking this medication.    Opioid Risk Score: 1    Interpretation of Opioid Risk Score   Score 0-3 = Low risk of abuse. Do UDS at least once per year.  Score 4-7 = Moderate risk of abuse. Do UDS 1-4 times per year.  Score 8+ = High risk of abuse. Refer to specialist.    Patient understands this prescription is a controlled substance which is potentially habit-forming and its use is regulated by the [place]. We also discussed the new \"black box\" warning regarding the lethal combination of opioids and benzodiazepines. Refills are subject to terms of a controlled substance agreement and patient has an updated one on file. Any refill requires an office visit. This medicine can cause nausea, significant constipation, sedation, confusion and accidental overdose.  "

## 2019-10-18 ENCOUNTER — TELEPHONE (OUTPATIENT)
Dept: OCCUPATIONAL MEDICINE | Facility: CLINIC | Age: 69
End: 2019-10-18

## 2019-10-23 ENCOUNTER — SLEEP CENTER VISIT (OUTPATIENT)
Dept: SLEEP MEDICINE | Facility: MEDICAL CENTER | Age: 69
End: 2019-10-23
Payer: MEDICARE

## 2019-10-23 VITALS
RESPIRATION RATE: 16 BRPM | HEART RATE: 73 BPM | WEIGHT: 181 LBS | HEIGHT: 63 IN | DIASTOLIC BLOOD PRESSURE: 82 MMHG | BODY MASS INDEX: 32.07 KG/M2 | SYSTOLIC BLOOD PRESSURE: 150 MMHG | OXYGEN SATURATION: 91 %

## 2019-10-23 DIAGNOSIS — G47.33 OSA (OBSTRUCTIVE SLEEP APNEA): ICD-10-CM

## 2019-10-23 PROCEDURE — 99214 OFFICE O/P EST MOD 30 MIN: CPT | Performed by: NURSE PRACTITIONER

## 2019-10-23 NOTE — PATIENT INSTRUCTIONS
1) Continue CPAP at 8cmH20  2) Clean mask and supplies weekly and change them as insurance allows  3) Light conditioning encouraged  4) Continue smoking cessation   5) Vaccines: Up to date with Prevnar 13 and Pneumovax 23, flu  6) Return in about 6 months (around 4/23/2020) for follow up with EMIR Velazquez, Compliance.
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
## 2019-11-08 ENCOUNTER — TELEPHONE (OUTPATIENT)
Dept: PHYSICAL THERAPY | Facility: REHABILITATION | Age: 69
End: 2019-11-08

## 2019-11-08 NOTE — OP THERAPY DISCHARGE SUMMARY
Outpatient Physical Therapy  DISCHARGE SUMMARY NOTE      [facility]  [address]  [place] [address]  Phone:  [phone]  Fax:  [phone]    Date of Visit: 11/08/2019    Patient: Kayla Jensen  YOB: 1950  MRN: [id number]     Referring Provider: Bill Shah D.O.   Referring Diagnosis Hip strain, right, subsequent encounter [S76.011D];Strain of lumbar region, subsequent encounter [S39.012D];Thoracic myofascial strain, subsequent encounter [S29.019D]     Physical Therapy Occurrence Codes    Date physical therapy care plan established or reviewed:  8/20/19   Date physical therapy treatment started:  8/20/19          Functional Assessment Used        Your patient is being discharged from Physical Therapy with the following comments:   · Goals not met    Comments:  Pt discharged as of 9/23/19. Failed to improve with any subjective symptoms. Objectively WNL.      Limitations Remaining:  Chronic pain.     Recommendations:  F/u with referring physician, f/u with physiatry     Madi Wilkinson, PT, DPT    Date: 11/8/2019

## 2019-11-12 ENCOUNTER — OFFICE VISIT (OUTPATIENT)
Dept: CARDIOLOGY | Facility: MEDICAL CENTER | Age: 69
End: 2019-11-12
Payer: MEDICARE

## 2019-11-12 VITALS
HEIGHT: 63 IN | BODY MASS INDEX: 31.89 KG/M2 | OXYGEN SATURATION: 95 % | HEART RATE: 68 BPM | WEIGHT: 180 LBS | DIASTOLIC BLOOD PRESSURE: 76 MMHG | SYSTOLIC BLOOD PRESSURE: 140 MMHG

## 2019-11-12 DIAGNOSIS — E78.5 DYSLIPIDEMIA: ICD-10-CM

## 2019-11-12 DIAGNOSIS — I31.39 PERICARDIAL EFFUSION: ICD-10-CM

## 2019-11-12 DIAGNOSIS — I71.21 ASCENDING AORTIC ANEURYSM (HCC): ICD-10-CM

## 2019-11-12 DIAGNOSIS — R93.1 ELEVATED CORONARY ARTERY CALCIUM SCORE: ICD-10-CM

## 2019-11-12 PROCEDURE — 99214 OFFICE O/P EST MOD 30 MIN: CPT | Performed by: INTERNAL MEDICINE
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
## 2019-12-05 DIAGNOSIS — M54.42 CHRONIC LEFT-SIDED LOW BACK PAIN WITH LEFT-SIDED SCIATICA: ICD-10-CM

## 2019-12-05 DIAGNOSIS — G89.29 CHRONIC LEFT-SIDED LOW BACK PAIN WITH LEFT-SIDED SCIATICA: ICD-10-CM

## 2019-12-05 RX ORDER — GABAPENTIN 300 MG/1
CAPSULE ORAL
Qty: 540 CAP | Refills: 0 | Status: SHIPPED | OUTPATIENT
Start: 2019-12-05

## 2019-12-06 ENCOUNTER — HOSPITAL ENCOUNTER (OUTPATIENT)
Dept: RADIOLOGY | Facility: REHABILITATION | Age: 69
End: 2019-12-06
Attending: PHYSICAL MEDICINE & REHABILITATION

## 2019-12-06 ENCOUNTER — HOSPITAL ENCOUNTER (OUTPATIENT)
Dept: PAIN MANAGEMENT | Facility: REHABILITATION | Age: 69
End: 2019-12-06
Attending: PHYSICAL MEDICINE & REHABILITATION
Payer: COMMERCIAL

## 2019-12-06 VITALS
BODY MASS INDEX: 31.29 KG/M2 | RESPIRATION RATE: 14 BRPM | WEIGHT: 176.59 LBS | HEIGHT: 63 IN | HEART RATE: 68 BPM | DIASTOLIC BLOOD PRESSURE: 70 MMHG | SYSTOLIC BLOOD PRESSURE: 171 MMHG | OXYGEN SATURATION: 95 % | TEMPERATURE: 96.7 F

## 2019-12-06 PROCEDURE — 700101 HCHG RX REV CODE 250

## 2019-12-06 PROCEDURE — 62323 NJX INTERLAMINAR LMBR/SAC: CPT

## 2019-12-06 PROCEDURE — 700111 HCHG RX REV CODE 636 W/ 250 OVERRIDE (IP)

## 2019-12-06 PROCEDURE — 700117 HCHG RX CONTRAST REV CODE 255

## 2019-12-06 RX ORDER — METHYLPREDNISOLONE ACETATE 80 MG/ML
INJECTION, SUSPENSION INTRA-ARTICULAR; INTRALESIONAL; INTRAMUSCULAR; SOFT TISSUE
Status: COMPLETED
Start: 2019-12-06 | End: 2019-12-06

## 2019-12-06 RX ORDER — LIDOCAINE HYDROCHLORIDE 20 MG/ML
INJECTION, SOLUTION EPIDURAL; INFILTRATION; INTRACAUDAL; PERINEURAL
Status: COMPLETED
Start: 2019-12-06 | End: 2019-12-06

## 2019-12-06 RX ORDER — METHYLPREDNISOLONE ACETATE 40 MG/ML
INJECTION, SUSPENSION INTRA-ARTICULAR; INTRALESIONAL; INTRAMUSCULAR; SOFT TISSUE
Status: COMPLETED
Start: 2019-12-06 | End: 2019-12-06

## 2019-12-06 RX ADMIN — LIDOCAINE HYDROCHLORIDE 4 ML: 20 INJECTION, SOLUTION EPIDURAL; INFILTRATION; INTRACAUDAL; PERINEURAL at 09:17

## 2019-12-06 RX ADMIN — METHYLPREDNISOLONE ACETATE 80 MG: 80 INJECTION, SUSPENSION INTRA-ARTICULAR; INTRALESIONAL; INTRAMUSCULAR; SOFT TISSUE at 09:20

## 2019-12-06 RX ADMIN — LIDOCAINE HYDROCHLORIDE 0.5 ML: 20 INJECTION, SOLUTION EPIDURAL; INFILTRATION; INTRACAUDAL; PERINEURAL at 09:20

## 2019-12-06 RX ADMIN — METHYLPREDNISOLONE ACETATE 40 MG: 40 INJECTION, SUSPENSION INTRA-ARTICULAR; INTRALESIONAL; INTRAMUSCULAR; SOFT TISSUE at 09:20

## 2019-12-06 RX ADMIN — IOHEXOL 1 ML: 240 INJECTION, SOLUTION INTRATHECAL; INTRAVASCULAR; INTRAVENOUS; ORAL at 09:19

## 2019-12-06 NOTE — NON-PROVIDER
Pre- nursing assessment done. Preparatory pre- procedure education given and understood by patient.Consent signed and H&P updated.

## 2019-12-06 NOTE — NON-PROVIDER
Current meds. See medication reconciliation form. Reviewed with pt. Pt denies taking ASA,other blood thinners or anti-inflammatories. Pre-procedure assessment completed and information (hx sleep apnea with CPAP use,COPD, CAD and aortic aneurysm) communicated to procedure room RN during handoff. Pt has a ride post-procedure (,Long is ). Printed and verbal discharge instructions as well as education regarding infection prevention given to pt/pt's family who verbalized understanding.

## 2019-12-06 NOTE — NON-PROVIDER
Patient identified, procedure confirmed, medication allergies, pertinent medical history ( sleep apnea, COPD, CAD ).  Site marked by Dr. Araiza  . Positioned patient by ANNETTE RN, X - ray Tech. Both lower legs & feet pillow placed for support. Hands supported on stool under head of the bed.

## 2019-12-07 NOTE — PROCEDURES
DATE OF SERVICE:  12/06/2019    PROCEDURES PERFORMED:  1.  Right L4-L5 interlaminar epidural steroid injection with 120 mg of   Depo-Medrol under fluoroscopic guidance.  2.  Permanent images were recorded.    INDICATIONS:  The patient is a patient of [facility] with low   back pain and pain in the right greater than the left lower extremity felt to   be related to a radiculitis from the L4-L5 level.    DESCRIPTION OF PROCEDURE:  After appropriate informed consent was obtained,   she was placed prone on the table.    Her skin was thoroughly cleansed with Betadine swabs x3.    Following this, the subcutaneous, intramuscular, and interligamentous region   was anesthetized with lidocaine.    A 3-1/2-inch, 20-gauge Tuohy catheter was directed under intermittent   fluoroscopic guidance to the L4-L5 interlaminar epidural space and loss of   resistance technique was used to determine the epidural space.    EPIDUROGRAM:  Omnipaque 1 mL was placed at L4-L5.  This proved to be   subligamentous with good cephalad and caudad flow and the flow was   unrestricted.    Depo-Medrol 120 mg along with 0.5 mL of 2% lidocaine was placed at L4-L5.    She tolerated the procedure well without procedure complications.    She was referred to the recovery area and will follow up in the office in the   next 2-3 weeks to monitor response to the injection.       ____________________________________     MD MEGAN Harris / CARYN    DD:  12/06/2019 09:24:45  DT:  12/06/2019 23:27:04    D#:  [id number]  Job#:  [id number]    cc: Rajani Hinton

## 2019-12-13 ENCOUNTER — HOSPITAL ENCOUNTER (OUTPATIENT)
Dept: RADIOLOGY | Facility: MEDICAL CENTER | Age: 69
End: 2019-12-13
Attending: PHYSICAL MEDICINE & REHABILITATION
Payer: COMMERCIAL

## 2019-12-13 DIAGNOSIS — M54.2 CERVICALGIA: ICD-10-CM

## 2019-12-13 PROCEDURE — 72141 MRI NECK SPINE W/O DYE: CPT

## 2020-01-08 DIAGNOSIS — E78.5 DYSLIPIDEMIA: ICD-10-CM

## 2020-01-08 RX ORDER — ATORVASTATIN CALCIUM 40 MG/1
TABLET, FILM COATED ORAL
Qty: 100 TAB | Refills: 0 | Status: SHIPPED | OUTPATIENT
Start: 2020-01-08 | End: 2020-04-01

## 2020-01-08 NOTE — TELEPHONE ENCOUNTER
Requested Prescriptions     Pending Prescriptions Disp Refills   • atorvastatin (LIPITOR) 40 MG Tab [Pharmacy Med Name: ATORVASTATIN 40 MG TABLET] 100 Tab 0     Sig: TAKE 1 TABLET BY MOUTH EVERYDAY AT BEDTIME       XAVI Castano.

## 2020-01-23 ENCOUNTER — TELEPHONE (OUTPATIENT)
Dept: MEDICAL GROUP | Facility: PHYSICIAN GROUP | Age: 70
End: 2020-01-23

## 2020-01-23 DIAGNOSIS — J44.9 MILD CHRONIC OBSTRUCTIVE PULMONARY DISEASE (HCC): ICD-10-CM

## 2020-01-23 NOTE — PROGRESS NOTES
1. Mild chronic obstructive pulmonary disease (HCC)  - Umeclidinium Bromide (INCRUSE ELLIPTA) 62.5 MCG/INH AEROSOL POWDER, BREATH ACTIVATED; Inhale 62.5 mcg by mouth every day.  Dispense: 1 Each; Refill: 5

## 2020-01-23 NOTE — TELEPHONE ENCOUNTER
Per Pharmacy:  Current Medication is tiotropium (SPIRIVA) 18 MCG Cap is not covered by Insurance please send in an Alternative  INCRUSE ELLIPTA 62.5 MCG INH   Thank you

## 2020-03-30 DIAGNOSIS — M54.42 CHRONIC LEFT-SIDED LOW BACK PAIN WITH LEFT-SIDED SCIATICA: ICD-10-CM

## 2020-03-30 DIAGNOSIS — G89.29 CHRONIC LEFT-SIDED LOW BACK PAIN WITH LEFT-SIDED SCIATICA: ICD-10-CM

## 2020-03-30 RX ORDER — TRAMADOL HYDROCHLORIDE 50 MG/1
50-100 TABLET ORAL
Qty: 30 TAB | Refills: 0 | Status: SHIPPED
Start: 2020-03-30 | End: 2020-05-07 | Stop reason: SDUPTHER

## 2020-03-30 RX ORDER — ACETAMINOPHEN AND CODEINE PHOSPHATE 300; 30 MG/1; MG/1
1-2 TABLET ORAL
Qty: 60 TAB | Refills: 0 | Status: SHIPPED
Start: 2020-03-30 | End: 2020-05-07 | Stop reason: SDUPTHER

## 2020-03-30 NOTE — TELEPHONE ENCOUNTER
----- Message from Kayla Jensen sent at 3/30/2020  9:23 AM PDT -----  Regarding: Prescription Question  Contact: [phone]  Good morning Dania,    I hope that you are okay.  I am running very low on my Tramadol and Codeine prescriptions.  I am wondering if my  can pick them up right now,  I am in the house and going out very little because of my health issues.  If this is possible please let me know.  Dania, stay  well.    Sincerely,    Viri Jensen

## 2020-03-30 NOTE — TELEPHONE ENCOUNTER
Rx fax to [facility] [id number] [place] [facility] - [place] - [address]  [address] [place] [address]  Phone: [phone] Fax: [phone]

## 2020-03-30 NOTE — TELEPHONE ENCOUNTER
Refill encounter attached       RAFFAELE SNYDER     Age: 69   demographics   Data as of: 03/30/2020         NARCOTIC 260    SEDATIVE 120    STIMULANT 000    NARxSCORES can range from 000 to 999. The first two digits represent the composite percentile risk based on an overall analysis of prescription drug use. The third digit represents the number of active prescriptions. The distribution of scores in the population is such that approximately 75% fall below 200, 95% fall below 500 and 99% fall below 650. The information on this report is not warranted as accurate or complete. This report is based on the search criteria supplied and the data entered by the dispensing pharmacy. For more information about any prescription, please contact the dispensing pharmacy or the prescriber. NARxSCORES and Reports are intended to aid, not replace medical decision making. None of the information presented should be used as sole justification for providing or refusing to provide medications.   [id number]  Rx Graph    Grayed out drugs could not be included in score calculations.  [x]  Narcotic  [x]  Buprenorphine  [x]  Sedative  [x]  Stimulant  [x]  Other    All PrescribersPrescribers5 - Dania Lombardi4 - Dion Caicedos3 - Bill Shah2 - Jhonny Alexandra, J1 - Jeremiah CALDERÓN [id number]  Morphine MgEq (MME)  [id number]  Buprenorphine mg  [id number]  Lorazepam MgEq (LME)  [id number]  *Per CDC guidance, the MME conversion factors prescribed or provided as part of the medication-assisted treatment for opioid use disorder should not be used to benchmark against dosage thresholds meant for opioids prescribed for pain. Buprenorphine products have no agreed upon morphine equivalency, and as partial opioid agonists, are not expected to be associated with overdose risk in the same dose-dependent manner as doses for full agonist opioids. MME = morphine  milligram equivalents. LME = Lorazepam milligram equivalents. MG = dose in milligrams.   Data Analysis                                                                                                             Summary  Total Prescriptions:    32    Total Prescribers:    5    Total Pharmacies:    1    Narcotics* (excluding Buprenorphine)  Current Qty:   0   Current MME/day:   0.00   30 Day Avg MME/day:   0.00   Buprenorphine*  Current Qty:   0   Current mg/day:   0.00   30 Day Avg mg/day:   0.00   Sedatives*  Current Qty:   0   Current LME/day:   0.00   30 Day Avg LME/day:   0.00   *Highlighted drugs could not be included in score calculations   Prescriptions  Total Prescriptions: 32    Total Private Pay: 23    Fill Date ID   Written Drug Qty Days Prescriber Rx # Pharmacy Refill   Daily Dose* Pymt Type      01/07/2020  1   10/07/2019  Tramadol Hcl 50 MG Tablet  30.00 30 Kr Young   [id number]   [place] (2114)   0  5.00 MME  [place]   12/30/2019  1   10/07/2019  Acetaminophen-Cod #3 Tablet  60.00 30 Kr [place]   [id number]   [place] (2114[place]   0  9.00 [place]  [place]   12/02/2019  1   10/07/2019  Tramadol Hcl 50 MG Tablet  30.00 30 Kr Young   [id number]   [place] (2114[place]   0  5.00 [place]  [place]   11/29/2019  1   10/07/2019  Acetaminophen-Cod #3 Tablet  60.00 30 Kr Young   [id number]   [place] (2114[place]   0  9.00 [place]  [place]   11/01/2019  1   10/07/2019  Tramadol Hcl 50 MG Tablet  30.00 30 Kr Young   [id number]   [place] (2114[place]   0  5.00 [place]  [place]   10/27/2019  1   10/07/2019  Acetaminophen-Cod #3 Tablet  60.00 30 Kr Young   [id number]   [place] (2114)   0  9.00 M[place]  [place]   09/27/2019  1   05/24/2019  Tramadol Hcl 50 MG Tablet  60.00 30 Mo [place]   [id number]   [place] (2114)   0  10.00 MME  [place]   09/27/2019  1   05/24/2019  Acetaminophen-Cod #3 Tablet  60.00 30 Mo [place]as[place]   [id number]   [place] (2114)   0  9.00 MME  [place]   09/19/2019  1   09/18/2019  Carisoprodol 250 MG Tablet  15.00 15 Daniel Ward   [id number]   [place] (3501)   0   0.20 LME  [place]   [place]   08/13/2019  1   05/24/2019  Acetaminophen-Cod #3 Tablet  60.00 30 Mo [place]   [id number]   [place] (2114[place]   0  9.00 MME  [place]   06/18/2019  1   05/24/2019  Acetaminophen-Cod #3 Tablet  60.00 30 [place]   [id number]   [place] ([place]114[place]   0  9.00 MME  [place]   06/18/2019  1   05/24/2019  Tramadol Hcl 50 MG Tablet  60.00 30 Mo [place]   [id number]   [place] ([place]114[place]   0  10.00 MME  [place]   05/14/2019  1   03/01/2019  Acetaminophen-Cod #3 Tablet  60.00 30 Mo [place]   [id number]   [place] ([place]114[place]   0  9.00 MME  [place]   04/11/2019  1   03/01/2019  Tramadol Hcl 50 MG Tablet  60.00 30 Mo [place]   [id number]   [place] (2114[place]   0  10.00 MME  [place]   04/04/2019  1   11/30/2018  Acetaminophen-Cod #3 Tablet  60.00 30 Mo [place]   [id number]   [place] (2114[place]   0  9.00 MME  [place]   04/03/2019  1   04/03/2019  Zolpidem Tartrate 5 MG Tablet  3.00 3 Jemima And   [id number]   [place] (2114)   0  0.25 LME  [place]   03/08/2019  1   03/01/2019  Acetaminophen-Cod #3 Tablet  60.00 30 Mo [place]   [id number]   [place] (2114)   0  9.00 MME  [place]   03/08/2019  1   03/01/2019  Tramadol Hcl 50 MG Tablet  60.00 30 Mo [place]   [id number]   [place] ([place]114[place]   0  10.00 MME  [place]   01/21/2019  1   11/30/2018  Acetaminophen-Cod #3 Tablet  60.00 30 Mo [place]   [id number]   [place] ([place]114[place]   0  9.00 MME  [place]   01/21/2019  1   11/30/2018  Tramadol Hcl 50 MG Tablet  60.00 30 Mo [place]   [id number]   [place] ([place]114[place]   0  10.00 MME  [place]   11/30/2018  1   09/07/2018  Tramadol Hcl 50 MG Tablet  60.00 30 Mo [place]   [id number]   [place] ([place]114[place]   0  10.00 MME  [place]   11/30/2018  1   09/07/2018  Acetaminophen-Cod #3 Tablet  60.00 30 Mo [place]   [id number]   [place] ([place]114[place]   0  9.00 MME  [place]   10/24/2018  1   09/07/2018  Tramadol Hcl 50 MG Tablet  60.00 30 Mo [place]   [id number]   [place] ([place]114[place]   0  10.00 MME  [place]   10/24/2018  1   09/07/2018  Acetaminophen-Cod #3 Tablet  60.00 30 Mo [place]   [id number]   [place]  (2114)   0  9.00 MME  [place]   09/07/2018  1   09/07/2018  Acetaminophen-Cod #3 Tablet  60.00 30 Mo [place]   [id number]   [place] (2114)   0  9.00 MME  [place]   09/07/2018  1   09/07/2018  Tramadol Hcl 50 MG Tablet  60.00 30 [place]   [id number]   [place] ([place]114)   0  10.00 MME  [place]   07/25/2018  1   07/25/2018  Hydrocodone-Acetamin 5-325 MG  20.00 5 Na [place]   [id number]   [place] (2114)   0  20.00 MME  [place]   07/07/2018  1   05/14/2018  Acetaminophen-Cod #3 Tablet  60.00 30 Mo [place]   [id number]   [place] ([place]114)   0  9.00 MME  [place]   07/07/2018  1   05/14/2018  Tramadol Hcl 50 MG Tablet  60.00 30 Mo [place]   [id number]   [place] (2114[place]   0  10.00 MME  [place]   05/14/2018  1   05/14/2018  Tramadol Hcl 50 MG Tablet  60.00 30 Mo [place]   [id number]   [place] (2114)   0  10.00 MME  [place]   05/14/2018  1   05/14/2018  Acetaminophen-Cod #3 Tablet  60.00 30 Mo [place]   [id number]   [place] ([place]114[place]   0  9.00 MME  [place]   04/11/2018  1   04/10/2018  Tramadol Hcl 50 MG Tablet  30.00 30 Mo [place]   [id number]   [place] (2114)   0  5.00 MME  [place]   *Per CDC guidance, the MME conversion factors prescribed or provided as part of the medication-assisted treatment for opioid use disorder should not be used to benchmark against dosage thresholds meant for opioids prescribed for pain. Buprenorphine products have no agreed upon morphine equivalency, and as partial opioid agonists, are not expected to be associated with overdose risk in the same dose-dependent manner as doses for full agonist opioids. MME = morphine milligram equivalents. LME = Lorazepam milligram equivalents. MG = dose in milligrams.   Providers  Total Providers: 5   Name Address City State Zipcode Phone   Jeremiah Terrazas [address]   [place] [address]    Jhonny Alexandra Jr [address] [place] [address] [place] [address]    Bill Shah [address] [facility] [address]    Dion Kinsey [address] [facility] [address]    Dania Lombardi [address]   [facility] [address]    Pharmacies  Total Pharmacies: 1   Name Address [facility] State Zipcode Phone   [facility] [phone] [address]   [facility] [address] [phone]   This Graph is Interactive  Patient Demographics  Score History  Drug Details  Prescriber Details

## 2020-03-30 NOTE — TELEPHONE ENCOUNTER
Requested Prescriptions     Pending Prescriptions Disp Refills   • Acetaminophen-Codeine (TYLENOL/CODEINE #3) 300-30 MG Tab 60 Tab 0     Sig: Take 1-2 Tabs by mouth 1 time daily as needed (severe pain at bedtime only) for up to 30 days.   • tramadol (ULTRAM) 50 MG Tab 30 Tab 0     Sig: Take 1-2 Tabs by mouth 1 time daily as needed for Severe Pain (6 hours apart from Tylenol #3) for up to 30 days.       KATY Castano  Obtained and reviewed patient utilization report from [facility] pharmacy database on 3/30/2020 prior to writing prescription for controlled substance II, III or IV per [place] bill . Based on assessment of the report, the prescription tramadol and Tylenol 3 is medically necessary.     Must be seen in clinic for further refills.

## 2020-04-01 DIAGNOSIS — E78.2 MIXED HYPERLIPIDEMIA: ICD-10-CM

## 2020-04-01 RX ORDER — ATORVASTATIN CALCIUM 40 MG/1
40 TABLET, FILM COATED ORAL DAILY
Qty: 90 TAB | Refills: 0 | Status: SHIPPED | OUTPATIENT
Start: 2020-04-01 | End: 2020-06-24 | Stop reason: SDUPTHER

## 2020-05-04 ENCOUNTER — TELEPHONE (OUTPATIENT)
Dept: MEDICAL GROUP | Facility: PHYSICIAN GROUP | Age: 70
End: 2020-05-04

## 2020-05-04 RX ORDER — TRAMADOL HYDROCHLORIDE 50 MG/1
TABLET ORAL
Qty: 30 TAB | Refills: 0 | OUTPATIENT
Start: 2020-05-04

## 2020-05-04 NOTE — TELEPHONE ENCOUNTER
Phone Number Called:[phone] (home)        Call outcome: Left detailed message for patient. Informed to call back with any additional questions.    Message: Informed patient of refill denial, and advised to call and schedule a appointment.

## 2020-05-04 NOTE — TELEPHONE ENCOUNTER
Received request via: Pharmacy    Was the patient seen in the last year in this department? Yes 10/7/19    Does the patient have an active prescription (recently filled or refills available) for medication(s) requested? No

## 2020-05-04 NOTE — TELEPHONE ENCOUNTER
Patient needs appointment for refill of controlled substances. It has been more than 90 days since her last appointment.

## 2020-05-07 ENCOUNTER — OFFICE VISIT (OUTPATIENT)
Dept: MEDICAL GROUP | Facility: PHYSICIAN GROUP | Age: 70
End: 2020-05-07

## 2020-05-07 VITALS
SYSTOLIC BLOOD PRESSURE: 138 MMHG | OXYGEN SATURATION: 93 % | DIASTOLIC BLOOD PRESSURE: 80 MMHG | WEIGHT: 177 LBS | HEART RATE: 83 BPM | RESPIRATION RATE: 16 BRPM | TEMPERATURE: 98.3 F | HEIGHT: 63 IN | BODY MASS INDEX: 31.36 KG/M2

## 2020-05-07 DIAGNOSIS — G89.29 CHRONIC BILATERAL LOW BACK PAIN WITH LEFT-SIDED SCIATICA: ICD-10-CM

## 2020-05-07 DIAGNOSIS — G89.29 CHRONIC LEFT-SIDED LOW BACK PAIN WITH LEFT-SIDED SCIATICA: ICD-10-CM

## 2020-05-07 DIAGNOSIS — Z79.891 CHRONIC USE OF OPIATE DRUG FOR THERAPEUTIC PURPOSE: ICD-10-CM

## 2020-05-07 DIAGNOSIS — E66.9 CLASS 1 OBESITY WITHOUT SERIOUS COMORBIDITY WITH BODY MASS INDEX (BMI) OF 31.0 TO 31.9 IN ADULT, UNSPECIFIED OBESITY TYPE: ICD-10-CM

## 2020-05-07 DIAGNOSIS — M54.42 CHRONIC LEFT-SIDED LOW BACK PAIN WITH LEFT-SIDED SCIATICA: ICD-10-CM

## 2020-05-07 DIAGNOSIS — M54.42 CHRONIC BILATERAL LOW BACK PAIN WITH LEFT-SIDED SCIATICA: ICD-10-CM

## 2020-05-07 PROCEDURE — 99214 OFFICE O/P EST MOD 30 MIN: CPT | Performed by: NURSE PRACTITIONER

## 2020-05-07 RX ORDER — ACETAMINOPHEN AND CODEINE PHOSPHATE 300; 30 MG/1; MG/1
1-2 TABLET ORAL NIGHTLY PRN
Qty: 60 TAB | Refills: 0 | Status: SHIPPED | OUTPATIENT
Start: 2020-05-07 | End: 2020-05-07 | Stop reason: SDUPTHER

## 2020-05-07 RX ORDER — HYDROCHLOROTHIAZIDE 25 MG/1
25 TABLET ORAL DAILY
COMMUNITY

## 2020-05-07 RX ORDER — ACETAMINOPHEN AND CODEINE PHOSPHATE 300; 30 MG/1; MG/1
1-2 TABLET ORAL NIGHTLY PRN
Qty: 60 TAB | Refills: 0 | Status: SHIPPED | OUTPATIENT
Start: 2020-06-06 | End: 2020-05-07 | Stop reason: SDUPTHER

## 2020-05-07 RX ORDER — LISINOPRIL 5 MG/1
5 TABLET ORAL DAILY
COMMUNITY

## 2020-05-07 RX ORDER — TRAMADOL HYDROCHLORIDE 50 MG/1
50 TABLET ORAL 2 TIMES DAILY PRN
Qty: 60 TAB | Refills: 0 | Status: SHIPPED | OUTPATIENT
Start: 2020-05-07 | End: 2020-05-07 | Stop reason: SDUPTHER

## 2020-05-07 RX ORDER — ACETAMINOPHEN AND CODEINE PHOSPHATE 300; 30 MG/1; MG/1
1-2 TABLET ORAL NIGHTLY PRN
Qty: 60 TAB | Refills: 0 | Status: SHIPPED | OUTPATIENT
Start: 2020-07-06 | End: 2020-08-05

## 2020-05-07 RX ORDER — TRAMADOL HYDROCHLORIDE 50 MG/1
50 TABLET ORAL 2 TIMES DAILY PRN
Qty: 60 TAB | Refills: 0 | Status: SHIPPED | OUTPATIENT
Start: 2020-07-06 | End: 2020-08-05

## 2020-05-07 RX ORDER — TRAMADOL HYDROCHLORIDE 50 MG/1
50 TABLET ORAL 2 TIMES DAILY PRN
Qty: 60 TAB | Refills: 0 | Status: SHIPPED | OUTPATIENT
Start: 2020-06-06 | End: 2020-05-07 | Stop reason: SDUPTHER

## 2020-05-07 ASSESSMENT — FIBROSIS 4 INDEX: FIB4 SCORE: 1.42

## 2020-05-07 ASSESSMENT — PATIENT HEALTH QUESTIONNAIRE - PHQ9: CLINICAL INTERPRETATION OF PHQ2 SCORE: 0

## 2020-05-07 NOTE — ASSESSMENT & PLAN NOTE
"Chronic, ongoing.  Patient reports that pain was caused by a fall years ago that ended up being a Worker's Comp. case.  The patient continues to experience right-sided low back pain, right hip pain, right arm pain, and right hand numbness.  The pain has continued to worsen over time.  She continues to take gabapentin 900 mg twice daily, tramadol  mg once every morning, and Tylenol 3 in the evening as this helps with the pain and drowsiness. Reports that she tried to stop taking the tramadol to \"see if she needed it, or if it was doing anything\" and she had severe pain that took days to recover from.  She has been prescribed lidocaine patches, which were not covered by her insurance.  She does continue to follow with Dr. Tijerina for procedural pain management, which have been put on hold due to COVID-19.  "

## 2020-05-12 NOTE — ASSESSMENT & PLAN NOTE
"Chronic, ongoing.   Weight change: Stable  Diet: good  Exercise: \"try to stay physically active\", is limited due to chronic pain.  Vitals 10/23/2019 11/12/2019 12/6/2019 5/7/2020   WEIGHT 181 180 176.59 177   HEIGHT 5' 3\" 5' 3\" 5' 3\" 5' 3\"   BMI 32.06 kg/m2 31.89 kg/m2 31.28 kg/m2 31.35 kg/m2     "

## 2020-05-12 NOTE — ASSESSMENT & PLAN NOTE
"UDS: 10/7/2019    Controlled Substance Treatment Agreement: 10/7/2019    Obtained and reviewed the patient utilization report state pharmacy database on 5/7/2020.  Based on assessment of report prescription for tramadol and tylenol #3  is medically necessary. Patient has not requested any early refills and exhibits no abberant behavior. I have advised patient to keep medication and safe place and to not drive, use alcohol, or take illicit drugs while taking this medication.    Opioid Risk Score: 1    Interpretation of Opioid Risk Score   Score 0-3 = Low risk of abuse. Do UDS at least once per year.  Score 4-7 = Moderate risk of abuse. Do UDS 1-4 times per year.  Score 8+ = High risk of abuse. Refer to specialist.    Patient understands this prescription is a controlled substance which is potentially habit-forming and its use is regulated by the [place]. We also discussed the new \"black box\" warning regarding the lethal combination of opioids and benzodiazepines. Refills are subject to terms of a controlled substance agreement and patient has an updated one on file. Any refill requires an office visit. This medicine can cause nausea, significant constipation, sedation, confusion and accidental overdose.  "
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
## 2020-05-20 ENCOUNTER — HOSPITAL ENCOUNTER (OUTPATIENT)
Facility: MEDICAL CENTER | Age: 70
End: 2020-05-20
Payer: COMMERCIAL

## 2020-06-09 ENCOUNTER — HOSPITAL ENCOUNTER (EMERGENCY)
Dept: HOSPITAL 8 - ED | Age: 70
Discharge: HOME | End: 2020-06-09
Payer: MEDICARE

## 2020-06-09 VITALS — BODY MASS INDEX: 32.78 KG/M2 | HEIGHT: 62 IN | WEIGHT: 178.13 LBS

## 2020-06-09 VITALS — SYSTOLIC BLOOD PRESSURE: 128 MMHG | DIASTOLIC BLOOD PRESSURE: 71 MMHG

## 2020-06-09 DIAGNOSIS — I51.9: ICD-10-CM

## 2020-06-09 DIAGNOSIS — M54.2: Primary | ICD-10-CM

## 2020-06-09 DIAGNOSIS — R20.0: ICD-10-CM

## 2020-06-09 LAB
ALBUMIN SERPL-MCNC: 3.4 G/DL (ref 3.4–5)
ALP SERPL-CCNC: 130 U/L (ref 45–117)
ALT SERPL-CCNC: 43 U/L (ref 12–78)
ANION GAP SERPL CALC-SCNC: 12 MMOL/L (ref 5–15)
BASOPHILS # BLD AUTO: 0.03 X10^3/UL (ref 0–0.1)
BASOPHILS NFR BLD AUTO: 0 % (ref 0–1)
BILIRUB SERPL-MCNC: 0.9 MG/DL (ref 0.2–1)
CALCIUM SERPL-MCNC: 8.5 MG/DL (ref 8.5–10.1)
CHLORIDE SERPL-SCNC: 106 MMOL/L (ref 98–107)
CREAT SERPL-MCNC: 0.96 MG/DL (ref 0.55–1.02)
EOSINOPHIL # BLD AUTO: 0.16 X10^3/UL (ref 0–0.4)
EOSINOPHIL NFR BLD AUTO: 2 % (ref 1–7)
ERYTHROCYTE [DISTWIDTH] IN BLOOD BY AUTOMATED COUNT: 15 % (ref 9.6–15.2)
LYMPHOCYTES # BLD AUTO: 1.8 X10^3/UL (ref 1–3.4)
LYMPHOCYTES NFR BLD AUTO: 22 % (ref 22–44)
MCH RBC QN AUTO: 32.4 PG (ref 27–34.8)
MCHC RBC AUTO-ENTMCNC: 33.2 G/DL (ref 32.4–35.8)
MCV RBC AUTO: 97.3 FL (ref 80–100)
MD: NO
MONOCYTES # BLD AUTO: 0.56 X10^3/UL (ref 0.2–0.8)
MONOCYTES NFR BLD AUTO: 7 % (ref 2–9)
NEUTROPHILS # BLD AUTO: 5.75 X10^3/UL (ref 1.8–6.8)
NEUTROPHILS NFR BLD AUTO: 69 % (ref 42–75)
PLATELET # BLD AUTO: 217 X10^3/UL (ref 130–400)
PMV BLD AUTO: 7.5 FL (ref 7.4–10.4)
PROT SERPL-MCNC: 6.7 G/DL (ref 6.4–8.2)
RBC # BLD AUTO: 4.41 X10^6/UL (ref 3.82–5.3)

## 2020-06-09 PROCEDURE — 72072 X-RAY EXAM THORAC SPINE 3VWS: CPT

## 2020-06-09 PROCEDURE — 72050 X-RAY EXAM NECK SPINE 4/5VWS: CPT

## 2020-06-09 PROCEDURE — 85025 COMPLETE CBC W/AUTO DIFF WBC: CPT

## 2020-06-09 PROCEDURE — 99284 EMERGENCY DEPT VISIT MOD MDM: CPT

## 2020-06-09 PROCEDURE — 36415 COLL VENOUS BLD VENIPUNCTURE: CPT

## 2020-06-09 PROCEDURE — 80053 COMPREHEN METABOLIC PANEL: CPT

## 2020-06-09 NOTE — NUR
LATE ENTRY FOR 1050: PT GIVEN DC INSTRUCTIONS , PT A&O, RESPS EVEN AND 
UNLABORED, NEURO INTACT. PT GIVEN DC SCRIPT AND EDUCATION FOR MEDROL DOSE PACK. 
PT AMBULATORY TO DC DESK WITH STEADY GAIT ACCOMPANIED BY . BALDEMAR AT DC.

## 2020-06-09 NOTE — NUR
pt ambulatory to room 5 with steady gait, this RN assuming care. 

-------------------------------------------------------------------------------

Addendum: 06/09/20 at 0913 by MAINOR

-------------------------------------------------------------------------------

pt ambulatory to room 6 with steady gait, this RN assuming care.

## 2020-06-12 LAB
SARS-COV-2 RNA SPEC QL NAA+PROBE: NOT DETECTED
SPECIMEN SOURCE: NORMAL

## 2020-06-17 RX ORDER — HYDROCODONE BITARTRATE AND ACETAMINOPHEN 5; 325 MG/1; MG/1
1 TABLET ORAL EVERY 4 HOURS PRN
COMMUNITY

## 2020-06-17 NOTE — NON-PROVIDER
PAT call made 06/17/2020 at 1303. No answer at number provided. Message with call back number provided. Pt was informed that this call must be returned before 3 PM tomorrow (Thursday) in order go over pre-screening and pre-procedure instructions or the appt will be canceled.

## 2020-06-17 NOTE — NON-PROVIDER
PAT note: PAT call made 06/17/2020 at 1324. Spoke with Kayla. Health history, allergies, medications and pre-procedure/sedation instructions reviewed with patient. Pre-procedure respiratory screening completed. Pt denies being sick/symptomatic(fever, cough, shortness of breath)  within 72 hours or having been in close contact with symptomatic individuals in the past 2 weeks.Pt was informed to contact her physician should she or any close personal contact become symptomatic prior to the procedure. Pt was told to bring a mask as she would be wearing it during the entire stay. It was recommended that the pt self isolate until the procedure and that her  would have to remain on site in vehicle til pt is d/marika. Pt verbalized understanding of instructions.

## 2020-06-19 ENCOUNTER — HOSPITAL ENCOUNTER (OUTPATIENT)
Dept: RADIOLOGY | Facility: REHABILITATION | Age: 70
End: 2020-06-19
Attending: PHYSICAL MEDICINE & REHABILITATION

## 2020-06-19 ENCOUNTER — HOSPITAL ENCOUNTER (OUTPATIENT)
Dept: PAIN MANAGEMENT | Facility: REHABILITATION | Age: 70
End: 2020-06-19
Attending: PHYSICAL MEDICINE & REHABILITATION
Payer: COMMERCIAL

## 2020-06-19 VITALS
SYSTOLIC BLOOD PRESSURE: 162 MMHG | OXYGEN SATURATION: 95 % | DIASTOLIC BLOOD PRESSURE: 79 MMHG | TEMPERATURE: 97.4 F | HEIGHT: 62 IN | HEART RATE: 68 BPM | BODY MASS INDEX: 32.33 KG/M2 | RESPIRATION RATE: 16 BRPM | WEIGHT: 175.71 LBS

## 2020-06-19 PROCEDURE — 700117 HCHG RX CONTRAST REV CODE 255

## 2020-06-19 PROCEDURE — 700111 HCHG RX REV CODE 636 W/ 250 OVERRIDE (IP)

## 2020-06-19 PROCEDURE — 62323 NJX INTERLAMINAR LMBR/SAC: CPT

## 2020-06-19 PROCEDURE — 700101 HCHG RX REV CODE 250

## 2020-06-19 RX ORDER — METHYLPREDNISOLONE ACETATE 40 MG/ML
INJECTION, SUSPENSION INTRA-ARTICULAR; INTRALESIONAL; INTRAMUSCULAR; SOFT TISSUE
Status: COMPLETED
Start: 2020-06-19 | End: 2020-06-19

## 2020-06-19 RX ORDER — METHYLPREDNISOLONE ACETATE 80 MG/ML
INJECTION, SUSPENSION INTRA-ARTICULAR; INTRALESIONAL; INTRAMUSCULAR; SOFT TISSUE
Status: COMPLETED
Start: 2020-06-19 | End: 2020-06-19

## 2020-06-19 RX ORDER — LIDOCAINE HYDROCHLORIDE 20 MG/ML
INJECTION, SOLUTION EPIDURAL; INFILTRATION; INTRACAUDAL; PERINEURAL
Status: COMPLETED
Start: 2020-06-19 | End: 2020-06-19

## 2020-06-19 RX ADMIN — METHYLPREDNISOLONE ACETATE 80 MG: 80 INJECTION, SUSPENSION INTRA-ARTICULAR; INTRALESIONAL; INTRAMUSCULAR; SOFT TISSUE at 08:38

## 2020-06-19 RX ADMIN — METHYLPREDNISOLONE ACETATE 40 MG: 40 INJECTION, SUSPENSION INTRA-ARTICULAR; INTRALESIONAL; INTRAMUSCULAR; SOFT TISSUE at 08:38

## 2020-06-19 RX ADMIN — IOHEXOL 3 ML: 240 INJECTION, SOLUTION INTRATHECAL; INTRAVASCULAR; INTRAVENOUS; ORAL at 08:37

## 2020-06-19 RX ADMIN — LIDOCAINE HYDROCHLORIDE 5 ML: 20 INJECTION, SOLUTION EPIDURAL; INFILTRATION; INTRACAUDAL; PERINEURAL at 08:36

## 2020-06-19 ASSESSMENT — FIBROSIS 4 INDEX: FIB4 SCORE: 1.42

## 2020-06-19 NOTE — NON-PROVIDER
Current meds. See medication reconciliation form. Reviewed with pt. Pt denies taking ASA,other blood thinners or anti-inflammatories. Pre-procedure assessment completed and information  communicated to procedure room RN during handoff. (hx sleep anea,, mild COPD) Pt has a ride post-procedure (, Long is ). Printed and verbal discharge instructions as well as education regarding infection prevention given to pt/pt's family who verbalized understanding.

## 2020-06-19 NOTE — NON-PROVIDER
Preprocedure assessment completed.  Pertinent health information aortic aneurysm communicated to physician and staff during time out.  Patient positioned preprocedure by RN, CST and XRAY.  Pillow under knees for support.

## 2020-06-20 NOTE — PROCEDURES
DATE OF SERVICE:  06/19/2020    PROCEDURE PERFORMED:  L4-L5 interlaminar epidural steroid injection with 120   mg of Depo-Medrol under fluoroscopic guidance.    INDICATIONS:  The patient [facility] with low back   pain and right greater than left lower extremity pain, felt to be related to   L4-L5 radiculitis.    DESCRIPTION OF PROCEDURE:  After appropriate informed consent was obtained,   she was placed prone on the table.    Her skin was thoroughly cleansed with Betadine swabs x3.    Following this, the subcutaneous, intramuscular and interligamentous region   was anesthetized with lidocaine.    A 3.5-inch, 20-gauge Tuohy catheter was directed under intermittent   fluoroscopic guidance to the L4-L5 interlaminar epidural space, slightly   greater on the right than the left and loss of resistance technique was used   to determine the epidural space.    EPIDUROGRAM:  1 mL of Omnipaque was placed at L4-L5.  This proved to be   subligamentous with good cephalad and caudad flow and the flow was   unrestricted.  Dye flow was also visualized on the lateral view.    Depo-Medrol 120 mg along with 0.5 mL of 2% lidocaine was placed at L4-L5.    She tolerated the procedure well without procedure complications.    She was referred to the recovery area and will follow up in the office in the   next 2-3 weeks to monitor her response to the injection.       ____________________________________     MD MEGAN Harris / CARYN    DD:  06/19/2020 08:53:03  DT:  06/19/2020 22:16:33    D#:  [id number]  Job#:  [id number]

## 2020-06-24 DIAGNOSIS — E78.2 MIXED HYPERLIPIDEMIA: ICD-10-CM

## 2020-06-24 RX ORDER — ATORVASTATIN CALCIUM 40 MG/1
40 TABLET, FILM COATED ORAL DAILY
Qty: 90 TAB | Refills: 0 | Status: SHIPPED | OUTPATIENT
Start: 2020-06-24 | End: 2020-09-18

## 2020-06-24 NOTE — TELEPHONE ENCOUNTER
Requested Prescriptions     Pending Prescriptions Disp Refills   • atorvastatin (LIPITOR) 40 MG Tab 90 Tab 0     Sig: Take 1 Tab by mouth every day.       XAVI Castano.

## 2020-06-24 NOTE — TELEPHONE ENCOUNTER
Received request via: Pharmacy    Was the patient seen in the last year in this department? Yes 5/7/20    Does the patient have an active prescription (recently filled or refills available) for medication(s) requested? No

## 2020-07-29 ENCOUNTER — HOSPITAL ENCOUNTER (OUTPATIENT)
Dept: HOSPITAL 8 - CVU | Age: 70
Discharge: HOME | End: 2020-07-29
Attending: INTERNAL MEDICINE
Payer: MEDICARE

## 2020-07-29 DIAGNOSIS — I11.9: ICD-10-CM

## 2020-07-29 DIAGNOSIS — I08.0: Primary | ICD-10-CM

## 2020-07-29 DIAGNOSIS — I71.2: ICD-10-CM

## 2020-07-29 DIAGNOSIS — I71.4: ICD-10-CM

## 2020-07-29 PROCEDURE — 93356 MYOCRD STRAIN IMG SPCKL TRCK: CPT

## 2020-07-29 PROCEDURE — 93306 TTE W/DOPPLER COMPLETE: CPT

## 2020-08-14 ENCOUNTER — HOSPITAL ENCOUNTER (OUTPATIENT)
Dept: HOSPITAL 8 - CFH | Age: 70
Discharge: HOME | End: 2020-08-14
Attending: INTERNAL MEDICINE
Payer: MEDICARE

## 2020-08-14 DIAGNOSIS — I10: ICD-10-CM

## 2020-08-14 DIAGNOSIS — I71.4: ICD-10-CM

## 2020-08-14 DIAGNOSIS — K76.0: Primary | ICD-10-CM

## 2020-08-14 DIAGNOSIS — K57.30: ICD-10-CM

## 2020-08-14 DIAGNOSIS — I77.4: ICD-10-CM

## 2020-08-14 DIAGNOSIS — I71.2: ICD-10-CM

## 2020-08-14 DIAGNOSIS — R91.1: ICD-10-CM

## 2020-08-14 DIAGNOSIS — I70.0: ICD-10-CM

## 2020-08-14 PROCEDURE — 71275 CT ANGIOGRAPHY CHEST: CPT

## 2020-08-14 PROCEDURE — 74175 CTA ABDOMEN W/CONTRAST: CPT

## 2020-08-14 PROCEDURE — 82565 ASSAY OF CREATININE: CPT

## 2020-08-19 DIAGNOSIS — I71.20 THORACIC AORTIC ANEURYSM WITHOUT RUPTURE (HCC): ICD-10-CM

## 2020-09-18 DIAGNOSIS — E78.2 MIXED HYPERLIPIDEMIA: ICD-10-CM

## 2020-09-18 RX ORDER — ATORVASTATIN CALCIUM 40 MG/1
TABLET, FILM COATED ORAL
Qty: 90 TAB | Refills: 0 | Status: SHIPPED | OUTPATIENT
Start: 2020-09-18 | End: 2020-12-17

## 2020-09-18 NOTE — TELEPHONE ENCOUNTER
Received request via: Pharmacy    Was the patient seen in the last year in this department? Yes LOV 05/07/2020    Does the patient have an active prescription (recently filled or refills available) for medication(s) requested? No

## 2020-09-18 NOTE — TELEPHONE ENCOUNTER
Requested Prescriptions     Pending Prescriptions Disp Refills   • atorvastatin (LIPITOR) 40 MG Tab [Pharmacy Med Name: ATORVASTATIN 40 MG TABLET] 90 Tab 0     Sig: TAKE 1 TABLET BY MOUTH EVERY DAY       XAVI Castano.

## 2020-09-29 ENCOUNTER — HOSPITAL ENCOUNTER (INPATIENT)
Dept: HOSPITAL 8 - ORIP | Age: 70
LOS: 6 days | Discharge: HOME | DRG: 472 | End: 2020-10-05
Attending: NEUROLOGICAL SURGERY | Admitting: NEUROLOGICAL SURGERY
Payer: MEDICARE

## 2020-09-29 VITALS — BODY MASS INDEX: 35.66 KG/M2 | HEIGHT: 62 IN | WEIGHT: 193.79 LBS

## 2020-09-29 DIAGNOSIS — I10: ICD-10-CM

## 2020-09-29 DIAGNOSIS — M48.02: Primary | ICD-10-CM

## 2020-09-29 DIAGNOSIS — Z88.8: ICD-10-CM

## 2020-09-29 DIAGNOSIS — Z20.828: ICD-10-CM

## 2020-09-29 DIAGNOSIS — Z87.891: ICD-10-CM

## 2020-09-29 DIAGNOSIS — M54.12: ICD-10-CM

## 2020-09-29 DIAGNOSIS — J43.9: ICD-10-CM

## 2020-09-29 DIAGNOSIS — G47.30: ICD-10-CM

## 2020-09-29 DIAGNOSIS — G99.2: ICD-10-CM

## 2020-09-29 DIAGNOSIS — Z88.6: ICD-10-CM

## 2020-09-29 DIAGNOSIS — Z91.048: ICD-10-CM

## 2020-09-29 LAB
ALBUMIN SERPL-MCNC: 3.5 G/DL (ref 3.4–5)
ALP SERPL-CCNC: 136 U/L (ref 45–117)
ALT SERPL-CCNC: 29 U/L (ref 12–78)
ANION GAP SERPL CALC-SCNC: 6 MMOL/L (ref 5–15)
APTT BLD: 25 SECONDS (ref 25–31)
BASOPHILS # BLD AUTO: 0.04 X10^3/UL (ref 0–0.1)
BASOPHILS NFR BLD AUTO: 0 % (ref 0–1)
BILIRUB SERPL-MCNC: 0.5 MG/DL (ref 0.2–1)
CALCIUM SERPL-MCNC: 9.3 MG/DL (ref 8.5–10.1)
CHLORIDE SERPL-SCNC: 107 MMOL/L (ref 98–107)
CREAT SERPL-MCNC: 0.9 MG/DL (ref 0.55–1.02)
EOSINOPHIL # BLD AUTO: 0.14 X10^3/UL (ref 0–0.4)
EOSINOPHIL NFR BLD AUTO: 2 % (ref 1–7)
ERYTHROCYTE [DISTWIDTH] IN BLOOD BY AUTOMATED COUNT: 14.7 % (ref 9.6–15.2)
INR PPP: 0.95 (ref 0.93–1.1)
LYMPHOCYTES # BLD AUTO: 1.9 X10^3/UL (ref 1–3.4)
LYMPHOCYTES NFR BLD AUTO: 23 % (ref 22–44)
MCH RBC QN AUTO: 32.2 PG (ref 27–34.8)
MCHC RBC AUTO-ENTMCNC: 32.7 G/DL (ref 32.4–35.8)
MD: NO
MONOCYTES # BLD AUTO: 0.63 X10^3/UL (ref 0.2–0.8)
MONOCYTES NFR BLD AUTO: 8 % (ref 2–9)
NEUTROPHILS # BLD AUTO: 5.65 X10^3/UL (ref 1.8–6.8)
NEUTROPHILS NFR BLD AUTO: 68 % (ref 42–75)
PLATELET # BLD AUTO: 249 X10^3/UL (ref 130–400)
PMV BLD AUTO: 7.5 FL (ref 7.4–10.4)
PROT SERPL-MCNC: 7.1 G/DL (ref 6.4–8.2)
PROTHROMBIN TIME: 9.8 SECONDS (ref 9.6–11.5)
RBC # BLD AUTO: 4.43 X10^6/UL (ref 3.82–5.3)

## 2020-09-29 PROCEDURE — 85025 COMPLETE CBC W/AUTO DIFF WBC: CPT

## 2020-09-29 PROCEDURE — 72040 X-RAY EXAM NECK SPINE 2-3 VW: CPT

## 2020-09-29 PROCEDURE — 85730 THROMBOPLASTIN TIME PARTIAL: CPT

## 2020-09-29 PROCEDURE — 80053 COMPREHEN METABOLIC PANEL: CPT

## 2020-09-29 PROCEDURE — 95941 IONM REMOTE/>1 PT OR PER HR: CPT

## 2020-09-29 PROCEDURE — 93005 ELECTROCARDIOGRAM TRACING: CPT

## 2020-09-29 PROCEDURE — 71046 X-RAY EXAM CHEST 2 VIEWS: CPT

## 2020-09-29 PROCEDURE — 95938 SOMATOSENSORY TESTING: CPT

## 2020-09-29 PROCEDURE — 85610 PROTHROMBIN TIME: CPT

## 2020-09-29 PROCEDURE — 36415 COLL VENOUS BLD VENIPUNCTURE: CPT

## 2020-09-29 PROCEDURE — 87635 SARS-COV-2 COVID-19 AMP PRB: CPT

## 2020-09-29 PROCEDURE — 94660 CPAP INITIATION&MGMT: CPT

## 2020-09-29 PROCEDURE — 80048 BASIC METABOLIC PNL TOTAL CA: CPT

## 2020-09-29 PROCEDURE — 82040 ASSAY OF SERUM ALBUMIN: CPT

## 2020-09-29 PROCEDURE — C1713 ANCHOR/SCREW BN/BN,TIS/BN: HCPCS

## 2020-09-29 PROCEDURE — 94640 AIRWAY INHALATION TREATMENT: CPT

## 2020-10-02 VITALS — SYSTOLIC BLOOD PRESSURE: 115 MMHG | DIASTOLIC BLOOD PRESSURE: 71 MMHG

## 2020-10-02 VITALS — DIASTOLIC BLOOD PRESSURE: 71 MMHG | SYSTOLIC BLOOD PRESSURE: 122 MMHG

## 2020-10-02 PROCEDURE — 00NW0ZZ RELEASE CERVICAL SPINAL CORD, OPEN APPROACH: ICD-10-PCS | Performed by: NEUROLOGICAL SURGERY

## 2020-10-02 PROCEDURE — 0RG1071 FUSION OF CERVICAL VERTEBRAL JOINT WITH AUTOLOGOUS TISSUE SUBSTITUTE, POSTERIOR APPROACH, POSTERIOR COLUMN, OPEN APPROACH: ICD-10-PCS | Performed by: NEUROLOGICAL SURGERY

## 2020-10-02 RX ADMIN — FENTANYL CITRATE PRN MCG: 50 INJECTION INTRAMUSCULAR; INTRAVENOUS at 14:41

## 2020-10-02 RX ADMIN — FENTANYL CITRATE PRN MCG: 50 INJECTION INTRAMUSCULAR; INTRAVENOUS at 14:13

## 2020-10-02 RX ADMIN — SODIUM CHLORIDE AND POTASSIUM CHLORIDE SCH MLS/HR: .9; .15 SOLUTION INTRAVENOUS at 20:09

## 2020-10-02 RX ADMIN — CEFAZOLIN SODIUM SCH MLS/HR: 1 SOLUTION INTRAVENOUS at 20:11

## 2020-10-02 RX ADMIN — GABAPENTIN SCH MG: 300 CAPSULE ORAL at 20:09

## 2020-10-02 RX ADMIN — METHOCARBAMOL PRN MG: 750 TABLET ORAL at 22:35

## 2020-10-02 RX ADMIN — FENTANYL CITRATE PRN MCG: 50 INJECTION INTRAMUSCULAR; INTRAVENOUS at 14:32

## 2020-10-02 RX ADMIN — FENTANYL CITRATE PRN MCG: 50 INJECTION INTRAMUSCULAR; INTRAVENOUS at 15:03

## 2020-10-02 RX ADMIN — LISINOPRIL SCH MG: 5 TABLET ORAL at 20:10

## 2020-10-02 RX ADMIN — ATORVASTATIN CALCIUM SCH MG: 40 TABLET, FILM COATED ORAL at 20:09

## 2020-10-03 VITALS — DIASTOLIC BLOOD PRESSURE: 61 MMHG | SYSTOLIC BLOOD PRESSURE: 103 MMHG

## 2020-10-03 VITALS — DIASTOLIC BLOOD PRESSURE: 79 MMHG | SYSTOLIC BLOOD PRESSURE: 131 MMHG

## 2020-10-03 VITALS — SYSTOLIC BLOOD PRESSURE: 118 MMHG | DIASTOLIC BLOOD PRESSURE: 70 MMHG

## 2020-10-03 VITALS — SYSTOLIC BLOOD PRESSURE: 103 MMHG | DIASTOLIC BLOOD PRESSURE: 58 MMHG

## 2020-10-03 LAB
ALBUMIN SERPL-MCNC: 3.2 G/DL (ref 3.4–5)
ANION GAP SERPL CALC-SCNC: 4 MMOL/L (ref 5–15)
CALCIUM SERPL-MCNC: 8.6 MG/DL (ref 8.5–10.1)
CHLORIDE SERPL-SCNC: 104 MMOL/L (ref 98–107)
CREAT SERPL-MCNC: 0.91 MG/DL (ref 0.55–1.02)

## 2020-10-03 RX ADMIN — SODIUM CHLORIDE AND POTASSIUM CHLORIDE SCH MLS/HR: .9; .15 SOLUTION INTRAVENOUS at 09:20

## 2020-10-03 RX ADMIN — HYDROCHLOROTHIAZIDE SCH MG: 25 TABLET ORAL at 09:07

## 2020-10-03 RX ADMIN — GABAPENTIN SCH MG: 300 CAPSULE ORAL at 09:06

## 2020-10-03 RX ADMIN — METHOCARBAMOL PRN MG: 750 TABLET ORAL at 20:10

## 2020-10-03 RX ADMIN — LISINOPRIL SCH MG: 5 TABLET ORAL at 20:10

## 2020-10-03 RX ADMIN — ATORVASTATIN CALCIUM SCH MG: 40 TABLET, FILM COATED ORAL at 20:12

## 2020-10-03 RX ADMIN — DOCUSATE SODIUM 50MG AND SENNOSIDES 8.6MG SCH TAB: 8.6; 5 TABLET, FILM COATED ORAL at 09:06

## 2020-10-03 RX ADMIN — LISINOPRIL SCH MG: 5 TABLET ORAL at 09:06

## 2020-10-03 RX ADMIN — CEFAZOLIN SODIUM SCH MLS/HR: 1 SOLUTION INTRAVENOUS at 04:32

## 2020-10-03 RX ADMIN — METHOCARBAMOL PRN MG: 750 TABLET ORAL at 06:05

## 2020-10-03 RX ADMIN — GABAPENTIN SCH MG: 300 CAPSULE ORAL at 20:11

## 2020-10-03 RX ADMIN — BUDESONIDE AND FORMOTEROL FUMARATE DIHYDRATE SCH PUFF: 160; 4.5 AEROSOL RESPIRATORY (INHALATION) at 09:00

## 2020-10-04 VITALS — SYSTOLIC BLOOD PRESSURE: 119 MMHG | DIASTOLIC BLOOD PRESSURE: 66 MMHG

## 2020-10-04 VITALS — DIASTOLIC BLOOD PRESSURE: 63 MMHG | SYSTOLIC BLOOD PRESSURE: 114 MMHG

## 2020-10-04 VITALS — DIASTOLIC BLOOD PRESSURE: 68 MMHG | SYSTOLIC BLOOD PRESSURE: 127 MMHG

## 2020-10-04 VITALS — DIASTOLIC BLOOD PRESSURE: 59 MMHG | SYSTOLIC BLOOD PRESSURE: 96 MMHG

## 2020-10-04 RX ADMIN — GABAPENTIN SCH MG: 300 CAPSULE ORAL at 19:44

## 2020-10-04 RX ADMIN — OXYCODONE HYDROCHLORIDE PRN MG: 5 TABLET ORAL at 07:22

## 2020-10-04 RX ADMIN — ATORVASTATIN CALCIUM SCH MG: 40 TABLET, FILM COATED ORAL at 19:44

## 2020-10-04 RX ADMIN — HYDROCHLOROTHIAZIDE SCH MG: 25 TABLET ORAL at 07:20

## 2020-10-04 RX ADMIN — GABAPENTIN SCH MG: 300 CAPSULE ORAL at 07:22

## 2020-10-04 RX ADMIN — OXYCODONE HYDROCHLORIDE PRN MG: 5 TABLET ORAL at 23:42

## 2020-10-04 RX ADMIN — CYCLOBENZAPRINE HYDROCHLORIDE PRN MG: 10 TABLET, FILM COATED ORAL at 23:16

## 2020-10-04 RX ADMIN — LISINOPRIL SCH MG: 5 TABLET ORAL at 07:22

## 2020-10-04 RX ADMIN — SODIUM CHLORIDE AND POTASSIUM CHLORIDE SCH MLS/HR: .9; .15 SOLUTION INTRAVENOUS at 23:20

## 2020-10-04 RX ADMIN — CYCLOBENZAPRINE HYDROCHLORIDE PRN MG: 10 TABLET, FILM COATED ORAL at 07:22

## 2020-10-04 RX ADMIN — BUDESONIDE AND FORMOTEROL FUMARATE DIHYDRATE SCH PUFF: 160; 4.5 AEROSOL RESPIRATORY (INHALATION) at 09:00

## 2020-10-04 RX ADMIN — SODIUM CHLORIDE AND POTASSIUM CHLORIDE SCH MLS/HR: .9; .15 SOLUTION INTRAVENOUS at 10:00

## 2020-10-04 RX ADMIN — LISINOPRIL SCH MG: 5 TABLET ORAL at 19:44

## 2020-10-04 RX ADMIN — OXYCODONE HYDROCHLORIDE PRN MG: 5 TABLET ORAL at 15:09

## 2020-10-04 RX ADMIN — OXYCODONE HYDROCHLORIDE PRN MG: 5 TABLET ORAL at 19:44

## 2020-10-04 RX ADMIN — DOCUSATE SODIUM 50MG AND SENNOSIDES 8.6MG SCH TAB: 8.6; 5 TABLET, FILM COATED ORAL at 07:22

## 2020-10-05 VITALS — SYSTOLIC BLOOD PRESSURE: 125 MMHG | DIASTOLIC BLOOD PRESSURE: 62 MMHG

## 2020-10-05 VITALS — DIASTOLIC BLOOD PRESSURE: 68 MMHG | SYSTOLIC BLOOD PRESSURE: 124 MMHG

## 2020-10-05 VITALS — DIASTOLIC BLOOD PRESSURE: 62 MMHG | SYSTOLIC BLOOD PRESSURE: 128 MMHG

## 2020-10-05 RX ADMIN — SODIUM CHLORIDE AND POTASSIUM CHLORIDE SCH MLS/HR: .9; .15 SOLUTION INTRAVENOUS at 08:21

## 2020-10-05 RX ADMIN — OXYCODONE HYDROCHLORIDE PRN MG: 5 TABLET ORAL at 05:14

## 2020-10-05 RX ADMIN — GABAPENTIN SCH MG: 300 CAPSULE ORAL at 08:20

## 2020-10-05 RX ADMIN — LISINOPRIL SCH MG: 5 TABLET ORAL at 08:20

## 2020-10-05 RX ADMIN — HYDROCHLOROTHIAZIDE SCH MG: 25 TABLET ORAL at 08:20

## 2020-10-05 RX ADMIN — DOCUSATE SODIUM 50MG AND SENNOSIDES 8.6MG SCH TAB: 8.6; 5 TABLET, FILM COATED ORAL at 08:20

## 2020-12-17 DIAGNOSIS — E78.2 MIXED HYPERLIPIDEMIA: ICD-10-CM

## 2020-12-17 RX ORDER — ATORVASTATIN CALCIUM 40 MG/1
TABLET, FILM COATED ORAL
Qty: 90 TAB | Refills: 0 | Status: SHIPPED | OUTPATIENT
Start: 2020-12-17 | End: 2021-04-19 | Stop reason: SDUPTHER

## 2021-01-15 DIAGNOSIS — Z23 NEED FOR VACCINATION: ICD-10-CM

## 2021-01-26 ENCOUNTER — APPOINTMENT (OUTPATIENT)
Dept: FAMILY PLANNING/WOMEN'S HEALTH CLINIC | Facility: IMMUNIZATION CENTER | Age: 71
End: 2021-01-26
Attending: INTERNAL MEDICINE
Payer: MEDICARE

## 2021-01-26 ENCOUNTER — IMMUNIZATION (OUTPATIENT)
Dept: FAMILY PLANNING/WOMEN'S HEALTH CLINIC | Facility: IMMUNIZATION CENTER | Age: 71
End: 2021-01-26
Payer: MEDICARE

## 2021-01-26 DIAGNOSIS — Z23 NEED FOR VACCINATION: ICD-10-CM

## 2021-01-26 DIAGNOSIS — Z23 ENCOUNTER FOR VACCINATION: Primary | ICD-10-CM

## 2021-01-26 PROCEDURE — 91300 PFIZER SARS-COV-2 VACCINE: CPT | Performed by: PHYSICIAN ASSISTANT

## 2021-01-26 PROCEDURE — 0001A PFIZER SARS-COV-2 VACCINE: CPT | Performed by: PHYSICIAN ASSISTANT

## 2021-02-18 ENCOUNTER — IMMUNIZATION (OUTPATIENT)
Dept: FAMILY PLANNING/WOMEN'S HEALTH CLINIC | Facility: IMMUNIZATION CENTER | Age: 71
End: 2021-02-18
Attending: INTERNAL MEDICINE
Payer: MEDICARE

## 2021-02-18 DIAGNOSIS — Z23 ENCOUNTER FOR VACCINATION: Primary | ICD-10-CM

## 2021-02-18 PROCEDURE — 91300 PFIZER SARS-COV-2 VACCINE: CPT

## 2021-02-18 PROCEDURE — 0002A PFIZER SARS-COV-2 VACCINE: CPT

## 2021-04-19 DIAGNOSIS — E78.2 MIXED HYPERLIPIDEMIA: ICD-10-CM

## 2021-04-19 RX ORDER — ATORVASTATIN CALCIUM 40 MG/1
40 TABLET, FILM COATED ORAL
Qty: 90 TABLET | Refills: 0 | Status: SHIPPED | OUTPATIENT
Start: 2021-04-19

## 2021-04-20 NOTE — TELEPHONE ENCOUNTER
Requested Prescriptions     Pending Prescriptions Disp Refills   • atorvastatin (LIPITOR) 40 MG Tab 90 tablet 0     Sig: Take 1 tablet by mouth every day.       XAVI Castano.

## 2022-10-13 NOTE — TELEPHONE ENCOUNTER
Requested Prescriptions     Pending Prescriptions Disp Refills   • atorvastatin (LIPITOR) 40 MG Tab [Pharmacy Med Name: ATORVASTATIN 40 MG TABLET] 90 Tab 0     Sig: Take 1 Tab by mouth every day. Labs due before next refill       XAVI Castano.     Doxepin Pregnancy And Lactation Text: This medication is Pregnancy Category C and it isn't known if it is safe during pregnancy. It is also excreted in breast milk and breast feeding isn't recommended.

## (undated) DEVICE — PROTECTOR ULNA NERVE - (36PR/CA)

## (undated) DEVICE — GLOVE BIOGEL SZ 8 SURGICAL PF LTX - (50PR/BX 4BX/CA)

## (undated) DEVICE — DRAPE LOWER EXTREMETY - (6/CA)

## (undated) DEVICE — HEAD HOLDER JUNIOR/ADULT

## (undated) DEVICE — GLOVE BIOGEL PI ORTHO SZ 7.5 PF LF (40PR/BX)

## (undated) DEVICE — BAG, SPONGE COUNT 50600

## (undated) DEVICE — WATER IRRIGATION STERILE 1000ML (12EA/CA)

## (undated) DEVICE — SPONGE GAUZESTER 4 X 4 4PLY - (128PK/CA)

## (undated) DEVICE — SUTURE GENERAL

## (undated) DEVICE — SUCTION INSTRUMENT YANKAUER BULBOUS TIP W/O VENT (50EA/CA)

## (undated) DEVICE — DRAPE LARGE 3 QUARTER - (20/CA)

## (undated) DEVICE — GLOVE, LITE (PAIR)

## (undated) DEVICE — CHLORAPREP 26 ML APPLICATOR - ORANGE TINT(25/CA)

## (undated) DEVICE — DRAPE SURGICAL U 77X120 - (10/CA)

## (undated) DEVICE — KIT ANESTHESIA W/CIRCUIT & 3/LT BAG W/FILTER (20EA/CA)

## (undated) DEVICE — NEEDLE SAFETY 18 GA X 1 1/2 IN (100EA/BX)

## (undated) DEVICE — GLOVE BIOGEL PI INDICATOR SZ 7.5 SURGICAL PF LF -(50/BX 4BX/CA)

## (undated) DEVICE — SUTURE 3-0 PROLENE PS-1 (12PK/BX)

## (undated) DEVICE — HUMID-VENT HEAT AND MOISTURE EXCHANGE- (50/BX)

## (undated) DEVICE — GOWN SURGICAL X-LARGE ULTRA - FILM-REINFORCED (20/CA)

## (undated) DEVICE — SODIUM CHL. IRRIGATION 0.9% 3000ML (4EA/CA 65CA/PF)

## (undated) DEVICE — DRESSING XEROFORM 1X8 - (50/BX 4BX/CA)

## (undated) DEVICE — ELECTRODE 850 FOAM ADHESIVE - HYDROGEL RADIOTRNSPRNT (50/PK)

## (undated) DEVICE — TUBING PATIENT W/CONNECTOR REDEUCE (1EA)

## (undated) DEVICE — SENSOR SPO2 NEO LNCS ADHESIVE (20/BX) SEE USER NOTES

## (undated) DEVICE — BANDAGE ELASTIC 6 IN X 5 YDS - LATEX FREE (10/BX)

## (undated) DEVICE — MASK ANESTHESIA ADULT  - (100/CA)

## (undated) DEVICE — GLOVE BIOGEL PI INDICATOR SZ 8.0 SURGICAL PF LF -(50/BX 4BX/CA)

## (undated) DEVICE — NEPTUNE 4 PORT MANIFOLD - (20/PK)

## (undated) DEVICE — GOWN WARMING STANDARD FLEX - (30/CA)

## (undated) DEVICE — GLOVE BIOGEL INDICATOR SZ 8 SURGICAL PF LTX - (50/BX 4BX/CA)

## (undated) DEVICE — CANISTER SUCTION RIGID RED 1500CC (40EA/CA)

## (undated) DEVICE — SODIUM CHL IRRIGATION 0.9% 1000ML (12EA/CA)

## (undated) DEVICE — BLADE SHAVER AGGRESSIVE PLUS 4.0MM ANGLED (5EA/BX)

## (undated) DEVICE — SYRINGE DISP. 60 CC LL - (30/BX, 12BX/CA)**WHEN THESE ARE GONE ORDER #500206**

## (undated) DEVICE — ELECTRODE DUAL RETURN W/ CORD - (50/PK)

## (undated) DEVICE — GLOVE BIOGEL SZ 7.5 SURGICAL PF LTX - (50PR/BX 4BX/CA)

## (undated) DEVICE — TUBE CONNECTING SUCTION - CLEAR PLASTIC STERILE 72 IN (50EA/CA)

## (undated) DEVICE — MASK AIRWAY SIZE 3 UNIQUE SILICON (10/BX)

## (undated) DEVICE — TUBING PUMP WITH CONNECTOR REDEUCE (1EA)

## (undated) DEVICE — KIT ROOM DECONTAMINATION